# Patient Record
Sex: FEMALE | Race: WHITE | URBAN - METROPOLITAN AREA
[De-identification: names, ages, dates, MRNs, and addresses within clinical notes are randomized per-mention and may not be internally consistent; named-entity substitution may affect disease eponyms.]

---

## 2021-09-24 ENCOUNTER — HOSPITAL ENCOUNTER (OUTPATIENT)
Dept: LAB | Age: 71
Discharge: HOME | End: 2021-09-24

## 2021-09-24 LAB
ANION GAP SERPL CALC-SCNC: 7 MMOL/L (ref 7–16)
BUN SERPL-MCNC: 43 MG/DL (ref 8–23)
CALCIUM SERPL-MCNC: 8.9 MG/DL (ref 8.3–10.4)
CHLORIDE SERPL-SCNC: 102 MMOL/L (ref 98–107)
CO2 SERPL-SCNC: 30 MMOL/L (ref 21–32)
CREAT SERPL-MCNC: 1.36 MG/DL (ref 0.6–1)
GLUCOSE SERPL-MCNC: 80 MG/DL (ref 65–100)
POTASSIUM SERPL-SCNC: 4.6 MMOL/L (ref 3.5–5.1)
SODIUM SERPL-SCNC: 139 MMOL/L (ref 136–145)

## 2021-09-24 PROCEDURE — 80048 BASIC METABOLIC PNL TOTAL CA: CPT

## 2024-01-23 ENCOUNTER — HOSPITAL ENCOUNTER (INPATIENT)
Age: 74
LOS: 1 days | Discharge: HOME | DRG: 481 | End: 2024-01-23
Attending: INTERNAL MEDICINE | Admitting: INTERNAL MEDICINE
Payer: MEDICARE

## 2024-01-23 ENCOUNTER — APPOINTMENT (OUTPATIENT)
Dept: GENERAL RADIOLOGY | Age: 74
DRG: 481 | End: 2024-01-23
Payer: MEDICARE

## 2024-01-23 ENCOUNTER — HOSPITAL ENCOUNTER (INPATIENT)
Age: 74
LOS: 7 days | Discharge: NURSING, REHAB OR LONG-TERM CARE | DRG: 481 | End: 2024-01-30
Attending: FAMILY MEDICINE | Admitting: INTERNAL MEDICINE
Payer: MEDICARE

## 2024-01-23 VITALS
BODY MASS INDEX: 48.82 KG/M2 | WEIGHT: 293 LBS | RESPIRATION RATE: 23 BRPM | TEMPERATURE: 98.5 F | DIASTOLIC BLOOD PRESSURE: 64 MMHG | SYSTOLIC BLOOD PRESSURE: 122 MMHG | HEIGHT: 65 IN | HEART RATE: 100 BPM | OXYGEN SATURATION: 96 %

## 2024-01-23 DIAGNOSIS — S72.92XA CLOSED FRACTURE OF LEFT FEMUR, UNSPECIFIED FRACTURE MORPHOLOGY, UNSPECIFIED PORTION OF FEMUR, INITIAL ENCOUNTER (HCC): Primary | ICD-10-CM

## 2024-01-23 DIAGNOSIS — S72.352P CLOSED DISP COMMINUTED FRACTURE OF SHAFT OF LEFT FEMUR WITH MALUNION: Primary | ICD-10-CM

## 2024-01-23 PROBLEM — I10 HYPERTENSION: Status: ACTIVE | Noted: 2024-01-23

## 2024-01-23 PROBLEM — G62.9 NEUROPATHY: Status: ACTIVE | Noted: 2024-01-23

## 2024-01-23 PROBLEM — N18.30 CKD (CHRONIC KIDNEY DISEASE) STAGE 3, GFR 30-59 ML/MIN (HCC): Status: ACTIVE | Noted: 2024-01-23

## 2024-01-23 PROBLEM — F32.A DEPRESSION: Status: ACTIVE | Noted: 2024-01-23

## 2024-01-23 PROBLEM — M06.9 RHEUMATOID ARTHRITIS (HCC): Status: ACTIVE | Noted: 2024-01-23

## 2024-01-23 LAB
ANION GAP SERPL CALC-SCNC: 4 MMOL/L (ref 2–11)
BASOPHILS # BLD: 0 K/UL (ref 0–0.2)
BASOPHILS NFR BLD: 0 % (ref 0–2)
BUN SERPL-MCNC: 18 MG/DL (ref 8–23)
CALCIUM SERPL-MCNC: 8.9 MG/DL (ref 8.3–10.4)
CHLORIDE SERPL-SCNC: 100 MMOL/L (ref 103–113)
CO2 SERPL-SCNC: 34 MMOL/L (ref 21–32)
CREAT SERPL-MCNC: 1.06 MG/DL (ref 0.6–1)
DIFFERENTIAL METHOD BLD: ABNORMAL
EOSINOPHIL # BLD: 0.3 K/UL (ref 0–0.8)
EOSINOPHIL NFR BLD: 2 % (ref 0.5–7.8)
ERYTHROCYTE [DISTWIDTH] IN BLOOD BY AUTOMATED COUNT: 17 % (ref 11.9–14.6)
GLUCOSE SERPL-MCNC: 129 MG/DL (ref 65–100)
HCT VFR BLD AUTO: 34.1 % (ref 35.8–46.3)
HGB BLD-MCNC: 10.5 G/DL (ref 11.7–15.4)
IMM GRANULOCYTES # BLD AUTO: 0.3 K/UL (ref 0–0.5)
IMM GRANULOCYTES NFR BLD AUTO: 2 % (ref 0–5)
INR PPP: 1.1
LYMPHOCYTES # BLD: 1.3 K/UL (ref 0.5–4.6)
LYMPHOCYTES NFR BLD: 9 % (ref 13–44)
MCH RBC QN AUTO: 29.7 PG (ref 26.1–32.9)
MCHC RBC AUTO-ENTMCNC: 30.8 G/DL (ref 31.4–35)
MCV RBC AUTO: 96.6 FL (ref 82–102)
MONOCYTES # BLD: 1 K/UL (ref 0.1–1.3)
MONOCYTES NFR BLD: 7 % (ref 4–12)
NEUTS SEG # BLD: 12.1 K/UL (ref 1.7–8.2)
NEUTS SEG NFR BLD: 80 % (ref 43–78)
NRBC # BLD: 0 K/UL (ref 0–0.2)
PLATELET # BLD AUTO: 233 K/UL (ref 150–450)
PMV BLD AUTO: 10.9 FL (ref 9.4–12.3)
POTASSIUM SERPL-SCNC: 3.8 MMOL/L (ref 3.5–5.1)
PROTHROMBIN TIME: 14.5 SEC (ref 11.3–14.9)
RBC # BLD AUTO: 3.53 M/UL (ref 4.05–5.2)
SODIUM SERPL-SCNC: 138 MMOL/L (ref 136–146)
WBC # BLD AUTO: 15.1 K/UL (ref 4.3–11.1)

## 2024-01-23 PROCEDURE — 96374 THER/PROPH/DIAG INJ IV PUSH: CPT

## 2024-01-23 PROCEDURE — 1100000000 HC RM PRIVATE

## 2024-01-23 PROCEDURE — 85025 COMPLETE CBC W/AUTO DIFF WBC: CPT

## 2024-01-23 PROCEDURE — 80048 BASIC METABOLIC PNL TOTAL CA: CPT

## 2024-01-23 PROCEDURE — 73552 X-RAY EXAM OF FEMUR 2/>: CPT

## 2024-01-23 PROCEDURE — 2580000003 HC RX 258: Performed by: INTERNAL MEDICINE

## 2024-01-23 PROCEDURE — 93005 ELECTROCARDIOGRAM TRACING: CPT

## 2024-01-23 PROCEDURE — 85610 PROTHROMBIN TIME: CPT

## 2024-01-23 PROCEDURE — 6360000002 HC RX W HCPCS: Performed by: INTERNAL MEDICINE

## 2024-01-23 PROCEDURE — 99285 EMERGENCY DEPT VISIT HI MDM: CPT

## 2024-01-23 PROCEDURE — 6360000002 HC RX W HCPCS

## 2024-01-23 RX ORDER — TAMSULOSIN HYDROCHLORIDE 0.4 MG/1
0.4 CAPSULE ORAL DAILY
Status: CANCELLED | OUTPATIENT
Start: 2024-01-23

## 2024-01-23 RX ORDER — SODIUM CHLORIDE 0.9 % (FLUSH) 0.9 %
5-40 SYRINGE (ML) INJECTION PRN
Status: CANCELLED | OUTPATIENT
Start: 2024-01-23

## 2024-01-23 RX ORDER — PREGABALIN 75 MG/1
75 CAPSULE ORAL 2 TIMES DAILY
Status: CANCELLED | OUTPATIENT
Start: 2024-01-23

## 2024-01-23 RX ORDER — ACETAMINOPHEN 650 MG/1
650 SUPPOSITORY RECTAL EVERY 6 HOURS PRN
Status: DISCONTINUED | OUTPATIENT
Start: 2024-01-23 | End: 2024-01-23 | Stop reason: HOSPADM

## 2024-01-23 RX ORDER — ACETAMINOPHEN 650 MG/1
650 SUPPOSITORY RECTAL EVERY 6 HOURS PRN
Status: CANCELLED | OUTPATIENT
Start: 2024-01-23

## 2024-01-23 RX ORDER — CELECOXIB 200 MG/1
200 CAPSULE ORAL DAILY
Status: ON HOLD | COMMUNITY
End: 2024-01-30 | Stop reason: HOSPADM

## 2024-01-23 RX ORDER — SODIUM CHLORIDE 0.9 % (FLUSH) 0.9 %
5-40 SYRINGE (ML) INJECTION EVERY 12 HOURS SCHEDULED
Status: DISCONTINUED | OUTPATIENT
Start: 2024-01-23 | End: 2024-01-23 | Stop reason: HOSPADM

## 2024-01-23 RX ORDER — ONDANSETRON 2 MG/ML
4 INJECTION INTRAMUSCULAR; INTRAVENOUS EVERY 6 HOURS PRN
Status: DISCONTINUED | OUTPATIENT
Start: 2024-01-23 | End: 2024-01-26 | Stop reason: SDUPTHER

## 2024-01-23 RX ORDER — MAGNESIUM SULFATE IN WATER 40 MG/ML
2000 INJECTION, SOLUTION INTRAVENOUS PRN
Status: DISCONTINUED | OUTPATIENT
Start: 2024-01-23 | End: 2024-01-23 | Stop reason: HOSPADM

## 2024-01-23 RX ORDER — ACETAMINOPHEN 325 MG/1
650 TABLET ORAL EVERY 6 HOURS PRN
Status: DISCONTINUED | OUTPATIENT
Start: 2024-01-23 | End: 2024-01-23 | Stop reason: HOSPADM

## 2024-01-23 RX ORDER — SODIUM CHLORIDE 0.9 % (FLUSH) 0.9 %
5-40 SYRINGE (ML) INJECTION PRN
Status: DISCONTINUED | OUTPATIENT
Start: 2024-01-23 | End: 2024-01-30 | Stop reason: HOSPADM

## 2024-01-23 RX ORDER — LANOLIN ALCOHOL/MO/W.PET/CERES
3 CREAM (GRAM) TOPICAL
COMMUNITY

## 2024-01-23 RX ORDER — ENOXAPARIN SODIUM 100 MG/ML
30 INJECTION SUBCUTANEOUS 2 TIMES DAILY
Status: DISCONTINUED | OUTPATIENT
Start: 2024-01-23 | End: 2024-01-24

## 2024-01-23 RX ORDER — POTASSIUM CHLORIDE 7.45 MG/ML
10 INJECTION INTRAVENOUS PRN
Status: DISCONTINUED | OUTPATIENT
Start: 2024-01-23 | End: 2024-01-30 | Stop reason: HOSPADM

## 2024-01-23 RX ORDER — METOPROLOL SUCCINATE 25 MG/1
25 TABLET, EXTENDED RELEASE ORAL DAILY
Status: ON HOLD | COMMUNITY

## 2024-01-23 RX ORDER — METOPROLOL SUCCINATE 25 MG/1
25 TABLET, EXTENDED RELEASE ORAL DAILY
Status: CANCELLED | OUTPATIENT
Start: 2024-01-23

## 2024-01-23 RX ORDER — SODIUM CHLORIDE 9 MG/ML
INJECTION, SOLUTION INTRAVENOUS PRN
Status: DISCONTINUED | OUTPATIENT
Start: 2024-01-23 | End: 2024-01-23 | Stop reason: HOSPADM

## 2024-01-23 RX ORDER — POTASSIUM CHLORIDE 20 MEQ/1
40 TABLET, EXTENDED RELEASE ORAL PRN
Status: CANCELLED | OUTPATIENT
Start: 2024-01-23

## 2024-01-23 RX ORDER — SODIUM CHLORIDE, SODIUM LACTATE, POTASSIUM CHLORIDE, CALCIUM CHLORIDE 600; 310; 30; 20 MG/100ML; MG/100ML; MG/100ML; MG/100ML
INJECTION, SOLUTION INTRAVENOUS CONTINUOUS
Status: DISCONTINUED | OUTPATIENT
Start: 2024-01-23 | End: 2024-01-23 | Stop reason: HOSPADM

## 2024-01-23 RX ORDER — ERGOCALCIFEROL 1.25 MG/1
50000 CAPSULE ORAL WEEKLY
Status: ON HOLD | COMMUNITY

## 2024-01-23 RX ORDER — POLYETHYLENE GLYCOL 3350 17 G/17G
17 POWDER, FOR SOLUTION ORAL DAILY PRN
Status: DISCONTINUED | OUTPATIENT
Start: 2024-01-23 | End: 2024-01-23 | Stop reason: HOSPADM

## 2024-01-23 RX ORDER — FERROUS SULFATE 325(65) MG
325 TABLET ORAL
Status: ON HOLD | COMMUNITY

## 2024-01-23 RX ORDER — MORPHINE SULFATE 2 MG/ML
2 INJECTION, SOLUTION INTRAMUSCULAR; INTRAVENOUS EVERY 4 HOURS PRN
Status: CANCELLED | OUTPATIENT
Start: 2024-01-23

## 2024-01-23 RX ORDER — ONDANSETRON 4 MG/1
4 TABLET, ORALLY DISINTEGRATING ORAL EVERY 8 HOURS PRN
Status: DISCONTINUED | OUTPATIENT
Start: 2024-01-23 | End: 2024-01-26 | Stop reason: SDUPTHER

## 2024-01-23 RX ORDER — FERROUS SULFATE 325(65) MG
325 TABLET ORAL
Status: CANCELLED | OUTPATIENT
Start: 2024-01-24

## 2024-01-23 RX ORDER — HYDROXYCHLOROQUINE SULFATE 200 MG/1
200 TABLET, FILM COATED ORAL 2 TIMES DAILY
Status: ON HOLD | COMMUNITY

## 2024-01-23 RX ORDER — ESCITALOPRAM OXALATE 10 MG/1
10 TABLET ORAL DAILY
Status: CANCELLED | OUTPATIENT
Start: 2024-01-23

## 2024-01-23 RX ORDER — SODIUM CHLORIDE 0.9 % (FLUSH) 0.9 %
5-40 SYRINGE (ML) INJECTION EVERY 12 HOURS SCHEDULED
Status: CANCELLED | OUTPATIENT
Start: 2024-01-23

## 2024-01-23 RX ORDER — POTASSIUM CHLORIDE 7.45 MG/ML
10 INJECTION INTRAVENOUS PRN
Status: DISCONTINUED | OUTPATIENT
Start: 2024-01-23 | End: 2024-01-23 | Stop reason: HOSPADM

## 2024-01-23 RX ORDER — TRAMADOL HYDROCHLORIDE 50 MG/1
50 TABLET ORAL EVERY 6 HOURS PRN
Status: DISCONTINUED | OUTPATIENT
Start: 2024-01-23 | End: 2024-01-30 | Stop reason: HOSPADM

## 2024-01-23 RX ORDER — POTASSIUM CHLORIDE 20 MEQ/1
40 TABLET, EXTENDED RELEASE ORAL PRN
Status: DISCONTINUED | OUTPATIENT
Start: 2024-01-23 | End: 2024-01-23 | Stop reason: HOSPADM

## 2024-01-23 RX ORDER — ENOXAPARIN SODIUM 100 MG/ML
30 INJECTION SUBCUTANEOUS 2 TIMES DAILY
Status: CANCELLED | OUTPATIENT
Start: 2024-01-23

## 2024-01-23 RX ORDER — DOXEPIN HYDROCHLORIDE 100 MG/1
150 CAPSULE ORAL NIGHTLY
Status: ON HOLD | COMMUNITY

## 2024-01-23 RX ORDER — AMLODIPINE BESYLATE 2.5 MG/1
2.5 TABLET ORAL DAILY
Status: ON HOLD | COMMUNITY

## 2024-01-23 RX ORDER — ONDANSETRON 2 MG/ML
4 INJECTION INTRAMUSCULAR; INTRAVENOUS EVERY 6 HOURS PRN
Status: DISCONTINUED | OUTPATIENT
Start: 2024-01-23 | End: 2024-01-23 | Stop reason: HOSPADM

## 2024-01-23 RX ORDER — SODIUM CHLORIDE, SODIUM LACTATE, POTASSIUM CHLORIDE, CALCIUM CHLORIDE 600; 310; 30; 20 MG/100ML; MG/100ML; MG/100ML; MG/100ML
INJECTION, SOLUTION INTRAVENOUS CONTINUOUS
Status: DISCONTINUED | OUTPATIENT
Start: 2024-01-23 | End: 2024-01-25

## 2024-01-23 RX ORDER — METOLAZONE 5 MG/1
5 TABLET ORAL DAILY PRN
Status: ON HOLD | COMMUNITY

## 2024-01-23 RX ORDER — MAGNESIUM SULFATE IN WATER 40 MG/ML
2000 INJECTION, SOLUTION INTRAVENOUS PRN
Status: CANCELLED | OUTPATIENT
Start: 2024-01-23

## 2024-01-23 RX ORDER — PREDNISONE 5 MG/1
5 TABLET ORAL DAILY
Status: ON HOLD | COMMUNITY

## 2024-01-23 RX ORDER — SODIUM CHLORIDE 0.9 % (FLUSH) 0.9 %
5-40 SYRINGE (ML) INJECTION PRN
Status: DISCONTINUED | OUTPATIENT
Start: 2024-01-23 | End: 2024-01-23 | Stop reason: HOSPADM

## 2024-01-23 RX ORDER — POLYETHYLENE GLYCOL 3350 17 G/17G
17 POWDER, FOR SOLUTION ORAL DAILY PRN
Status: DISCONTINUED | OUTPATIENT
Start: 2024-01-23 | End: 2024-01-30 | Stop reason: HOSPADM

## 2024-01-23 RX ORDER — ONDANSETRON 2 MG/ML
4 INJECTION INTRAMUSCULAR; INTRAVENOUS EVERY 6 HOURS PRN
Status: CANCELLED | OUTPATIENT
Start: 2024-01-23

## 2024-01-23 RX ORDER — TORSEMIDE 100 MG/1
50 TABLET ORAL DAILY
Status: ON HOLD | COMMUNITY

## 2024-01-23 RX ORDER — TRAMADOL HYDROCHLORIDE 50 MG/1
50 TABLET ORAL EVERY 6 HOURS PRN
Status: DISCONTINUED | OUTPATIENT
Start: 2024-01-23 | End: 2024-01-23 | Stop reason: HOSPADM

## 2024-01-23 RX ORDER — ESCITALOPRAM OXALATE 10 MG/1
10 TABLET ORAL DAILY
Status: ON HOLD | COMMUNITY

## 2024-01-23 RX ORDER — ACETAMINOPHEN 325 MG/1
650 TABLET ORAL EVERY 6 HOURS PRN
Status: CANCELLED | OUTPATIENT
Start: 2024-01-23

## 2024-01-23 RX ORDER — POTASSIUM CHLORIDE 7.45 MG/ML
10 INJECTION INTRAVENOUS PRN
Status: CANCELLED | OUTPATIENT
Start: 2024-01-23

## 2024-01-23 RX ORDER — MORPHINE SULFATE 4 MG/ML
4 INJECTION INTRAVENOUS ONCE
Status: COMPLETED | OUTPATIENT
Start: 2024-01-23 | End: 2024-01-23

## 2024-01-23 RX ORDER — MORPHINE SULFATE 2 MG/ML
2 INJECTION, SOLUTION INTRAMUSCULAR; INTRAVENOUS EVERY 4 HOURS PRN
Status: DISCONTINUED | OUTPATIENT
Start: 2024-01-23 | End: 2024-01-24

## 2024-01-23 RX ORDER — ONDANSETRON 4 MG/1
4 TABLET, ORALLY DISINTEGRATING ORAL EVERY 8 HOURS PRN
Status: CANCELLED | OUTPATIENT
Start: 2024-01-23

## 2024-01-23 RX ORDER — TRAZODONE HYDROCHLORIDE 50 MG/1
300 TABLET ORAL NIGHTLY
Status: CANCELLED | OUTPATIENT
Start: 2024-01-23

## 2024-01-23 RX ORDER — TRAZODONE HYDROCHLORIDE 150 MG/1
300 TABLET ORAL NIGHTLY
Status: ON HOLD | COMMUNITY

## 2024-01-23 RX ORDER — HYDROCODONE BITARTRATE AND ACETAMINOPHEN 5; 325 MG/1; MG/1
1 TABLET ORAL 2 TIMES DAILY PRN
COMMUNITY

## 2024-01-23 RX ORDER — LORAZEPAM 2 MG/1
2 TABLET ORAL NIGHTLY
COMMUNITY

## 2024-01-23 RX ORDER — ACETAMINOPHEN 325 MG/1
650 TABLET ORAL EVERY 6 HOURS PRN
Status: DISCONTINUED | OUTPATIENT
Start: 2024-01-23 | End: 2024-01-30 | Stop reason: HOSPADM

## 2024-01-23 RX ORDER — MORPHINE SULFATE 2 MG/ML
2 INJECTION, SOLUTION INTRAMUSCULAR; INTRAVENOUS EVERY 4 HOURS PRN
Status: DISCONTINUED | OUTPATIENT
Start: 2024-01-23 | End: 2024-01-23 | Stop reason: HOSPADM

## 2024-01-23 RX ORDER — SODIUM CHLORIDE 0.9 % (FLUSH) 0.9 %
5-40 SYRINGE (ML) INJECTION EVERY 12 HOURS SCHEDULED
Status: DISCONTINUED | OUTPATIENT
Start: 2024-01-23 | End: 2024-01-30 | Stop reason: HOSPADM

## 2024-01-23 RX ORDER — ONDANSETRON 4 MG/1
4 TABLET, ORALLY DISINTEGRATING ORAL EVERY 8 HOURS PRN
Status: DISCONTINUED | OUTPATIENT
Start: 2024-01-23 | End: 2024-01-23 | Stop reason: HOSPADM

## 2024-01-23 RX ORDER — SODIUM CHLORIDE, SODIUM LACTATE, POTASSIUM CHLORIDE, CALCIUM CHLORIDE 600; 310; 30; 20 MG/100ML; MG/100ML; MG/100ML; MG/100ML
INJECTION, SOLUTION INTRAVENOUS CONTINUOUS
Status: CANCELLED | OUTPATIENT
Start: 2024-01-23 | End: 2024-01-25

## 2024-01-23 RX ORDER — PREDNISONE 10 MG/1
5 TABLET ORAL DAILY
Status: CANCELLED | OUTPATIENT
Start: 2024-01-23

## 2024-01-23 RX ORDER — POTASSIUM CHLORIDE 20 MEQ/1
40 TABLET, EXTENDED RELEASE ORAL PRN
Status: DISCONTINUED | OUTPATIENT
Start: 2024-01-23 | End: 2024-01-30 | Stop reason: HOSPADM

## 2024-01-23 RX ORDER — HYDROXYCHLOROQUINE SULFATE 200 MG/1
200 TABLET, FILM COATED ORAL 2 TIMES DAILY
Status: CANCELLED | OUTPATIENT
Start: 2024-01-23

## 2024-01-23 RX ORDER — AMLODIPINE BESYLATE 5 MG/1
2.5 TABLET ORAL DAILY
Status: CANCELLED | OUTPATIENT
Start: 2024-01-23

## 2024-01-23 RX ORDER — FOLIC ACID 1 MG/1
1 TABLET ORAL DAILY
COMMUNITY

## 2024-01-23 RX ORDER — ACETAMINOPHEN 650 MG/1
650 SUPPOSITORY RECTAL EVERY 6 HOURS PRN
Status: DISCONTINUED | OUTPATIENT
Start: 2024-01-23 | End: 2024-01-30 | Stop reason: HOSPADM

## 2024-01-23 RX ORDER — MAGNESIUM SULFATE IN WATER 40 MG/ML
2000 INJECTION, SOLUTION INTRAVENOUS PRN
Status: DISCONTINUED | OUTPATIENT
Start: 2024-01-23 | End: 2024-01-30 | Stop reason: HOSPADM

## 2024-01-23 RX ORDER — SODIUM CHLORIDE 9 MG/ML
INJECTION, SOLUTION INTRAVENOUS PRN
Status: CANCELLED | OUTPATIENT
Start: 2024-01-23

## 2024-01-23 RX ORDER — ENOXAPARIN SODIUM 100 MG/ML
30 INJECTION SUBCUTANEOUS EVERY 12 HOURS
Status: DISCONTINUED | OUTPATIENT
Start: 2024-01-23 | End: 2024-01-23 | Stop reason: HOSPADM

## 2024-01-23 RX ORDER — DOXEPIN HYDROCHLORIDE 50 MG/1
150 CAPSULE ORAL NIGHTLY
Status: CANCELLED | OUTPATIENT
Start: 2024-01-23

## 2024-01-23 RX ORDER — POLYETHYLENE GLYCOL 3350 17 G/17G
17 POWDER, FOR SOLUTION ORAL DAILY PRN
Status: CANCELLED | OUTPATIENT
Start: 2024-01-23

## 2024-01-23 RX ORDER — TAMSULOSIN HYDROCHLORIDE 0.4 MG/1
0.4 CAPSULE ORAL DAILY
Status: ON HOLD | COMMUNITY

## 2024-01-23 RX ORDER — SODIUM CHLORIDE 9 MG/ML
INJECTION, SOLUTION INTRAVENOUS PRN
Status: DISCONTINUED | OUTPATIENT
Start: 2024-01-23 | End: 2024-01-30 | Stop reason: HOSPADM

## 2024-01-23 RX ORDER — PREGABALIN 75 MG/1
75 CAPSULE ORAL 2 TIMES DAILY
Status: ON HOLD | COMMUNITY

## 2024-01-23 RX ORDER — TRAMADOL HYDROCHLORIDE 50 MG/1
50 TABLET ORAL EVERY 6 HOURS PRN
Status: CANCELLED | OUTPATIENT
Start: 2024-01-23

## 2024-01-23 RX ADMIN — MORPHINE SULFATE 4 MG: 4 INJECTION, SOLUTION INTRAMUSCULAR; INTRAVENOUS at 17:38

## 2024-01-23 RX ADMIN — SODIUM CHLORIDE, SODIUM LACTATE, POTASSIUM CHLORIDE, AND CALCIUM CHLORIDE: 600; 310; 30; 20 INJECTION, SOLUTION INTRAVENOUS at 21:30

## 2024-01-23 RX ADMIN — ENOXAPARIN SODIUM 30 MG: 100 INJECTION SUBCUTANEOUS at 21:30

## 2024-01-23 RX ADMIN — MORPHINE SULFATE 2 MG: 2 INJECTION, SOLUTION INTRAMUSCULAR; INTRAVENOUS at 21:47

## 2024-01-23 ASSESSMENT — PAIN DESCRIPTION - ORIENTATION
ORIENTATION: LEFT
ORIENTATION: LEFT
ORIENTATION: LEFT;ANTERIOR;MID

## 2024-01-23 ASSESSMENT — ENCOUNTER SYMPTOMS
ABDOMINAL PAIN: 0
COLOR CHANGE: 0
SHORTNESS OF BREATH: 0
VOMITING: 0
CHEST TIGHTNESS: 0
DIARRHEA: 0
NAUSEA: 0

## 2024-01-23 ASSESSMENT — PAIN DESCRIPTION - DESCRIPTORS: DESCRIPTORS: ACHING

## 2024-01-23 ASSESSMENT — PAIN - FUNCTIONAL ASSESSMENT: PAIN_FUNCTIONAL_ASSESSMENT: 0-10

## 2024-01-23 ASSESSMENT — PAIN DESCRIPTION - LOCATION
LOCATION: LEG
LOCATION: LEG

## 2024-01-23 ASSESSMENT — PAIN SCALES - GENERAL
PAINLEVEL_OUTOF10: 5
PAINLEVEL_OUTOF10: 8
PAINLEVEL_OUTOF10: 3

## 2024-01-23 NOTE — ED NOTES
TRANSFER - OUT REPORT:    Verbal report given to Lance RN on Ama Chu  being transferred to [facility] for routine progression of patient care       Report consisted of patient's Situation, Background, Assessment and   Recommendations(SBAR).     Information from the following report(s) ED SBAR was reviewed with the receiving nurse.    Lines:   Peripheral IV 01/23/24 Right Antecubital (Active)        Opportunity for questions and clarification was provided.      Patient transported with:  Tech

## 2024-01-23 NOTE — ED PROVIDER NOTES
Emergency Department Provider Note       PCP: Jose Miguel Jimenez Jr., MD   Age: 73 y.o.   Sex: female     DISPOSITION Decision To Admit 01/23/2024 05:21:45 PM       ICD-10-CM    1. Closed fracture of left femur, unspecified fracture morphology, unspecified portion of femur, initial encounter ([facility])  S72.92XA           Medical Decision Making     Complexity of Problems Addressed:  1 or more acute illnesses that pose a threat to life or bodily function.     Data Reviewed and Analyzed:  I independently ordered and reviewed each unique test.         I interpreted the X-rays significantly displaced left femur fracture.    Discussion of management or test interpretation.  X-ray here confirms significantly displaced left distal femur fracture.  Vitals remained stable.  Discussed case with [facility].  Preoperative labs in process.  Patient understanding and agreeable.  The patient was admitted and I have discussed patient management with the admitting provider.  The management of this patient was discussed with an external consultant.      Risk of Complications and/or Morbidity of Patient Management:  Parental controlled substances given in the ED.  Discussion with external consultants.         History       Patient is a 73-year-old female with history of morbid obesity, chronic pain syndrome presenting to the emergency department for evaluation of femur fracture.  Patient states that she had a mechanical fall about a week ago and has been continuing to have persistent pain in her left lower extremity since and has been unable to bear any weight whatsoever.  States that she went to an orthopedic clinic earlier today where x-rays were performed which demonstrated a fractured femur.  She was advised to present to the hospital for surgical intervention.  She denies any other injuries.  She denies any distal numbness or paresthesias.  She  K/UL        XR FEMUR LEFT (MIN 2 VIEWS)    (Results Pending)                     Voice dictation software was used during the making of this note.  This software is not perfect and grammatical and other typographical errors may be present.  This note has not been completely proofread for errors.       Laina Higuera PA  01/23/24 1800

## 2024-01-23 NOTE — ED TRIAGE NOTES
Pt states she had a fall approx 1 week ago. Pt was seen by ortho today and was told she had a broken femur and was sent to ED.

## 2024-01-23 NOTE — H&P
Hospitalist History and Physical   Admit Date:  2024  4:32 PM   Name:  Ama Chu   Age:  73 y.o.  Sex:  female  :  1950   MRN:  [id number]   Room:  ER08/    Presenting/Chief Complaint: Fall     Reason(s) for Admission: Closed disp comminuted fracture of shaft of left femur with malunion [S72.352P]     History of Present Illness:   Ama Chu is a 73 y.o. female with medical history of morbid obesity, rheumatoid arthritis, neuropathy, hypertension, depression who presented with left lower leg pain that has persisted for the past week after sliding off her couch.  Patient was evaluated by her orthopedic surgeon who noted that she had a left displaced femur fracture and recommended patient go to the ER to be admitted for surgery.  Patient reports that she suffered the injury approximately 1 week ago.  She was sitting on the couch and was attempting to reach for something when she slid off of it and hurt her left leg.  Since then, she has had pain and was unable to bear weight.  She denied any worsening numbness, weakness in the lower extremity.  She denies any current fevers, chills, shortness of breath, chest pain, nausea, vomiting.    In the ER, patient had x-ray of the left femur which showed a displaced left femur fracture.  As per ER physician, case was discussed with orthopedic surgeon who recommended transfer to [facility] for surgery tomorrow with Dr. Braga's team.    Patient currently lives with her daughter.  She is independent in her ADLs and IADLs.  Recently, she has been using a wheelchair to get around she has had difficulty walking.  She is a former smoker, nondrinker, nondrug user.      Assessment & Plan:     Left femur fracture  As per ER discussion with orthopedic surgery, patient will be transferred to [facility] for surgery likely tomorrow.  White count slightly elevated in setting of fracture and likely continued steroid use.  -Follow-up orthopedic

## 2024-01-23 NOTE — PROGRESS NOTES
TRANSFER - IN REPORT:    Verbal report received from CANDICE Curran on Ama Chu  being received from  ED for routine progression of patient care      Report consisted of patient's Situation, Background, Assessment and   Recommendations(SBAR).     Information from the following report(s) Nurse Handoff Report was reviewed with the receiving nurse.    Opportunity for questions and clarification was provided.      Assessment completed upon patient's arrival to unit and care assumed.

## 2024-01-23 NOTE — H&P (VIEW-ONLY)
Hospitalist History and Physical   Admit Date:  2024  4:32 PM   Name:  Ama Chu   Age:  73 y.o.  Sex:  female  :  1950   MRN:  [id number]   Room:  ER08/    Presenting/Chief Complaint: Fall     Reason(s) for Admission: Closed disp comminuted fracture of shaft of left femur with malunion [S72.352P]     History of Present Illness:   Ama Chu is a 73 y.o. female with medical history of morbid obesity, rheumatoid arthritis, neuropathy, hypertension, depression who presented with left lower leg pain that has persisted for the past week after sliding off her couch.  Patient was evaluated by her orthopedic surgeon who noted that she had a left displaced femur fracture and recommended patient go to the ER to be admitted for surgery.  Patient reports that she suffered the injury approximately 1 week ago.  She was sitting on the couch and was attempting to reach for something when she slid off of it and hurt her left leg.  Since then, she has had pain and was unable to bear weight.  She denied any worsening numbness, weakness in the lower extremity.  She denies any current fevers, chills, shortness of breath, chest pain, nausea, vomiting.    In the ER, patient had x-ray of the left femur which showed a displaced left femur fracture.  As per ER physician, case was discussed with orthopedic surgeon who recommended transfer to [facility] for surgery tomorrow with Dr. Braga's team.    Patient currently lives with her daughter.  She is independent in her ADLs and IADLs.  Recently, she has been using a wheelchair to get around she has had difficulty walking.  She is a former smoker, nondrinker, nondrug user.      Assessment & Plan:     Left femur fracture  As per ER discussion with orthopedic surgery, patient will be transferred to [facility] for surgery likely tomorrow.  White count slightly elevated in setting of fracture and likely continued steroid use.  -Follow-up orthopedic

## 2024-01-24 ENCOUNTER — APPOINTMENT (OUTPATIENT)
Dept: GENERAL RADIOLOGY | Age: 74
DRG: 481 | End: 2024-01-24
Attending: FAMILY MEDICINE
Payer: MEDICARE

## 2024-01-24 ENCOUNTER — ANESTHESIA EVENT (OUTPATIENT)
Dept: SURGERY | Age: 74
End: 2024-01-24
Payer: MEDICARE

## 2024-01-24 ENCOUNTER — ANESTHESIA (OUTPATIENT)
Dept: SURGERY | Age: 74
End: 2024-01-24
Payer: MEDICARE

## 2024-01-24 LAB
ABO + RH BLD: NORMAL
ANION GAP SERPL CALC-SCNC: 2 MMOL/L (ref 2–11)
BASOPHILS # BLD: 0 K/UL (ref 0–0.2)
BASOPHILS NFR BLD: 0 % (ref 0–2)
BLOOD GROUP ANTIBODIES SERPL: NORMAL
BUN SERPL-MCNC: 15 MG/DL (ref 8–23)
CALCIUM SERPL-MCNC: 8.6 MG/DL (ref 8.3–10.4)
CHLORIDE SERPL-SCNC: 102 MMOL/L (ref 103–113)
CO2 SERPL-SCNC: 33 MMOL/L (ref 21–32)
CREAT SERPL-MCNC: 0.7 MG/DL (ref 0.6–1)
DIFFERENTIAL METHOD BLD: ABNORMAL
EKG ATRIAL RATE: 101 BPM
EKG DIAGNOSIS: NORMAL
EKG P AXIS: 121 DEGREES
EKG P-R INTERVAL: 179 MS
EKG Q-T INTERVAL: 379 MS
EKG QRS DURATION: 69 MS
EKG QTC CALCULATION (BAZETT): 489 MS
EKG R AXIS: 181 DEGREES
EKG T AXIS: 158 DEGREES
EKG VENTRICULAR RATE: 100 BPM
EOSINOPHIL # BLD: 0.3 K/UL (ref 0–0.8)
EOSINOPHIL NFR BLD: 2 % (ref 0.5–7.8)
ERYTHROCYTE [DISTWIDTH] IN BLOOD BY AUTOMATED COUNT: 17 % (ref 11.9–14.6)
GLUCOSE SERPL-MCNC: 120 MG/DL (ref 65–100)
HCT VFR BLD AUTO: 30.2 % (ref 35.8–46.3)
HGB BLD-MCNC: 9.4 G/DL (ref 11.7–15.4)
IMM GRANULOCYTES # BLD AUTO: 0.2 K/UL (ref 0–0.5)
IMM GRANULOCYTES NFR BLD AUTO: 2 % (ref 0–5)
LYMPHOCYTES # BLD: 1.3 K/UL (ref 0.5–4.6)
LYMPHOCYTES NFR BLD: 9 % (ref 13–44)
MCH RBC QN AUTO: 29.8 PG (ref 26.1–32.9)
MCHC RBC AUTO-ENTMCNC: 31.1 G/DL (ref 31.4–35)
MCV RBC AUTO: 95.9 FL (ref 82–102)
MONOCYTES # BLD: 0.9 K/UL (ref 0.1–1.3)
MONOCYTES NFR BLD: 6 % (ref 4–12)
NEUTS SEG # BLD: 11.6 K/UL (ref 1.7–8.2)
NEUTS SEG NFR BLD: 81 % (ref 43–78)
NRBC # BLD: 0 K/UL (ref 0–0.2)
PLATELET # BLD AUTO: 214 K/UL (ref 150–450)
PMV BLD AUTO: 10.7 FL (ref 9.4–12.3)
POTASSIUM SERPL-SCNC: 3.7 MMOL/L (ref 3.5–5.1)
RBC # BLD AUTO: 3.15 M/UL (ref 4.05–5.2)
SODIUM SERPL-SCNC: 137 MMOL/L (ref 136–146)
SPECIMEN EXP DATE BLD: NORMAL
WBC # BLD AUTO: 14.3 K/UL (ref 4.3–11.1)

## 2024-01-24 PROCEDURE — 6370000000 HC RX 637 (ALT 250 FOR IP): Performed by: ORTHOPAEDIC SURGERY

## 2024-01-24 PROCEDURE — 2580000003 HC RX 258: Performed by: INTERNAL MEDICINE

## 2024-01-24 PROCEDURE — 6370000000 HC RX 637 (ALT 250 FOR IP): Performed by: STUDENT IN AN ORGANIZED HEALTH CARE EDUCATION/TRAINING PROGRAM

## 2024-01-24 PROCEDURE — 86901 BLOOD TYPING SEROLOGIC RH(D): CPT

## 2024-01-24 PROCEDURE — 0QS906Z REPOSITION LEFT FEMORAL SHAFT WITH INTRAMEDULLARY INTERNAL FIXATION DEVICE, OPEN APPROACH: ICD-10-PCS | Performed by: ORTHOPAEDIC SURGERY

## 2024-01-24 PROCEDURE — 3700000001 HC ADD 15 MINUTES (ANESTHESIA): Performed by: ORTHOPAEDIC SURGERY

## 2024-01-24 PROCEDURE — 2709999900 HC NON-CHARGEABLE SUPPLY: Performed by: ORTHOPAEDIC SURGERY

## 2024-01-24 PROCEDURE — 3600000014 HC SURGERY LEVEL 4 ADDTL 15MIN: Performed by: ORTHOPAEDIC SURGERY

## 2024-01-24 PROCEDURE — 6360000002 HC RX W HCPCS: Performed by: ORTHOPAEDIC SURGERY

## 2024-01-24 PROCEDURE — 80048 BASIC METABOLIC PNL TOTAL CA: CPT

## 2024-01-24 PROCEDURE — 6370000000 HC RX 637 (ALT 250 FOR IP): Performed by: INTERNAL MEDICINE

## 2024-01-24 PROCEDURE — 2580000003 HC RX 258: Performed by: ORTHOPAEDIC SURGERY

## 2024-01-24 PROCEDURE — 2580000003 HC RX 258: Performed by: NURSE ANESTHETIST, CERTIFIED REGISTERED

## 2024-01-24 PROCEDURE — 7100000001 HC PACU RECOVERY - ADDTL 15 MIN: Performed by: ORTHOPAEDIC SURGERY

## 2024-01-24 PROCEDURE — 2720000010 HC SURG SUPPLY STERILE: Performed by: ORTHOPAEDIC SURGERY

## 2024-01-24 PROCEDURE — 6360000002 HC RX W HCPCS: Performed by: NURSE ANESTHETIST, CERTIFIED REGISTERED

## 2024-01-24 PROCEDURE — 93010 ELECTROCARDIOGRAM REPORT: CPT | Performed by: INTERNAL MEDICINE

## 2024-01-24 PROCEDURE — C1713 ANCHOR/SCREW BN/BN,TIS/BN: HCPCS | Performed by: ORTHOPAEDIC SURGERY

## 2024-01-24 PROCEDURE — 2500000003 HC RX 250 WO HCPCS: Performed by: HOSPITALIST

## 2024-01-24 PROCEDURE — 3700000000 HC ANESTHESIA ATTENDED CARE: Performed by: ORTHOPAEDIC SURGERY

## 2024-01-24 PROCEDURE — 73552 X-RAY EXAM OF FEMUR 2/>: CPT

## 2024-01-24 PROCEDURE — 36415 COLL VENOUS BLD VENIPUNCTURE: CPT

## 2024-01-24 PROCEDURE — 86900 BLOOD TYPING SEROLOGIC ABO: CPT

## 2024-01-24 PROCEDURE — 3600000004 HC SURGERY LEVEL 4 BASE: Performed by: ORTHOPAEDIC SURGERY

## 2024-01-24 PROCEDURE — 6360000002 HC RX W HCPCS: Performed by: INTERNAL MEDICINE

## 2024-01-24 PROCEDURE — 1100000000 HC RM PRIVATE

## 2024-01-24 PROCEDURE — 86850 RBC ANTIBODY SCREEN: CPT

## 2024-01-24 PROCEDURE — 7100000000 HC PACU RECOVERY - FIRST 15 MIN: Performed by: ORTHOPAEDIC SURGERY

## 2024-01-24 PROCEDURE — 2500000003 HC RX 250 WO HCPCS: Performed by: NURSE ANESTHETIST, CERTIFIED REGISTERED

## 2024-01-24 PROCEDURE — 85025 COMPLETE CBC W/AUTO DIFF WBC: CPT

## 2024-01-24 DEVICE — SCREW LCK F/IM NAIL 5X40MM XL25 STR: Type: IMPLANTABLE DEVICE | Site: FEMUR | Status: FUNCTIONAL

## 2024-01-24 DEVICE — SCREW BNE LCK 5X80 MM TI STRL RFNADVANCED 04045080S: Type: IMPLANTABLE DEVICE | Site: FEMUR | Status: FUNCTIONAL

## 2024-01-24 DEVICE — NAIL RFNA 12MMX 380MM 5 DEG BEND/ ST: Type: IMPLANTABLE DEVICE | Site: FEMUR | Status: FUNCTIONAL

## 2024-01-24 DEVICE — SCREW LK F/IM NAIL 5X72MM XL25 STERILE: Type: IMPLANTABLE DEVICE | Site: FEMUR | Status: FUNCTIONAL

## 2024-01-24 DEVICE — SCREW BNE LCK 5X64 MM XL25 RETROGRADE TI STRL RFNADVANCED: Type: IMPLANTABLE DEVICE | Site: FEMUR | Status: FUNCTIONAL

## 2024-01-24 RX ORDER — ESCITALOPRAM OXALATE 10 MG/1
10 TABLET ORAL DAILY
Status: DISCONTINUED | OUTPATIENT
Start: 2024-01-24 | End: 2024-01-30 | Stop reason: HOSPADM

## 2024-01-24 RX ORDER — HYDROCODONE BITARTRATE AND ACETAMINOPHEN 5; 325 MG/1; MG/1
2 TABLET ORAL EVERY 4 HOURS PRN
Status: DISCONTINUED | OUTPATIENT
Start: 2024-01-24 | End: 2024-01-30 | Stop reason: HOSPADM

## 2024-01-24 RX ORDER — LORAZEPAM 1 MG/1
2 TABLET ORAL NIGHTLY
Status: DISCONTINUED | OUTPATIENT
Start: 2024-01-24 | End: 2024-01-30 | Stop reason: HOSPADM

## 2024-01-24 RX ORDER — LIDOCAINE HYDROCHLORIDE 20 MG/ML
INJECTION, SOLUTION EPIDURAL; INFILTRATION; INTRACAUDAL; PERINEURAL PRN
Status: DISCONTINUED | OUTPATIENT
Start: 2024-01-24 | End: 2024-01-24 | Stop reason: SDUPTHER

## 2024-01-24 RX ORDER — HYDROCODONE BITARTRATE AND ACETAMINOPHEN 5; 325 MG/1; MG/1
1 TABLET ORAL 2 TIMES DAILY PRN
Status: DISCONTINUED | OUTPATIENT
Start: 2024-01-24 | End: 2024-01-24

## 2024-01-24 RX ORDER — ERGOCALCIFEROL 1.25 MG/1
50000 CAPSULE ORAL WEEKLY
Status: DISCONTINUED | OUTPATIENT
Start: 2024-01-24 | End: 2024-01-30 | Stop reason: HOSPADM

## 2024-01-24 RX ORDER — PREGABALIN 75 MG/1
75 CAPSULE ORAL 2 TIMES DAILY
Status: DISCONTINUED | OUTPATIENT
Start: 2024-01-24 | End: 2024-01-30 | Stop reason: HOSPADM

## 2024-01-24 RX ORDER — FENTANYL CITRATE 50 UG/ML
INJECTION, SOLUTION INTRAMUSCULAR; INTRAVENOUS PRN
Status: DISCONTINUED | OUTPATIENT
Start: 2024-01-24 | End: 2024-01-24 | Stop reason: SDUPTHER

## 2024-01-24 RX ORDER — LANOLIN ALCOHOL/MO/W.PET/CERES
3 CREAM (GRAM) TOPICAL
Status: DISCONTINUED | OUTPATIENT
Start: 2024-01-24 | End: 2024-01-30 | Stop reason: HOSPADM

## 2024-01-24 RX ORDER — TAMSULOSIN HYDROCHLORIDE 0.4 MG/1
0.4 CAPSULE ORAL DAILY
Status: DISCONTINUED | OUTPATIENT
Start: 2024-01-24 | End: 2024-01-30 | Stop reason: HOSPADM

## 2024-01-24 RX ORDER — ONDANSETRON 2 MG/ML
4 INJECTION INTRAMUSCULAR; INTRAVENOUS EVERY 6 HOURS PRN
Status: DISCONTINUED | OUTPATIENT
Start: 2024-01-24 | End: 2024-01-30 | Stop reason: HOSPADM

## 2024-01-24 RX ORDER — ASPIRIN 325 MG
325 TABLET, DELAYED RELEASE (ENTERIC COATED) ORAL 2 TIMES DAILY
Status: DISCONTINUED | OUTPATIENT
Start: 2024-01-24 | End: 2024-01-30 | Stop reason: HOSPADM

## 2024-01-24 RX ORDER — ONDANSETRON 4 MG/1
4 TABLET, ORALLY DISINTEGRATING ORAL EVERY 8 HOURS PRN
Status: DISCONTINUED | OUTPATIENT
Start: 2024-01-24 | End: 2024-01-30 | Stop reason: HOSPADM

## 2024-01-24 RX ORDER — NEOSTIGMINE METHYLSULFATE 1 MG/ML
INJECTION, SOLUTION INTRAVENOUS PRN
Status: DISCONTINUED | OUTPATIENT
Start: 2024-01-24 | End: 2024-01-24 | Stop reason: SDUPTHER

## 2024-01-24 RX ORDER — CELECOXIB 100 MG/1
200 CAPSULE ORAL DAILY
Status: DISCONTINUED | OUTPATIENT
Start: 2024-01-24 | End: 2024-01-30 | Stop reason: HOSPADM

## 2024-01-24 RX ORDER — TRAZODONE HYDROCHLORIDE 50 MG/1
300 TABLET ORAL NIGHTLY
Status: DISCONTINUED | OUTPATIENT
Start: 2024-01-24 | End: 2024-01-30 | Stop reason: HOSPADM

## 2024-01-24 RX ORDER — TORSEMIDE 20 MG/1
50 TABLET ORAL DAILY
Status: DISCONTINUED | OUTPATIENT
Start: 2024-01-24 | End: 2024-01-30 | Stop reason: HOSPADM

## 2024-01-24 RX ORDER — DOXEPIN HYDROCHLORIDE 50 MG/1
150 CAPSULE ORAL NIGHTLY
Status: DISCONTINUED | OUTPATIENT
Start: 2024-01-24 | End: 2024-01-30 | Stop reason: HOSPADM

## 2024-01-24 RX ORDER — HYDROXYCHLOROQUINE SULFATE 200 MG/1
200 TABLET, FILM COATED ORAL 2 TIMES DAILY
Status: DISCONTINUED | OUTPATIENT
Start: 2024-01-24 | End: 2024-01-30 | Stop reason: HOSPADM

## 2024-01-24 RX ORDER — FOLIC ACID 1 MG/1
1 TABLET ORAL DAILY
Status: DISCONTINUED | OUTPATIENT
Start: 2024-01-24 | End: 2024-01-30 | Stop reason: HOSPADM

## 2024-01-24 RX ORDER — SODIUM CHLORIDE 0.9 % (FLUSH) 0.9 %
5-40 SYRINGE (ML) INJECTION EVERY 12 HOURS SCHEDULED
Status: DISCONTINUED | OUTPATIENT
Start: 2024-01-24 | End: 2024-01-30 | Stop reason: HOSPADM

## 2024-01-24 RX ORDER — METOLAZONE 5 MG/1
5 TABLET ORAL DAILY PRN
Status: DISCONTINUED | OUTPATIENT
Start: 2024-01-24 | End: 2024-01-30 | Stop reason: HOSPADM

## 2024-01-24 RX ORDER — SUCCINYLCHOLINE/SOD CL,ISO/PF 200MG/10ML
SYRINGE (ML) INTRAVENOUS PRN
Status: DISCONTINUED | OUTPATIENT
Start: 2024-01-24 | End: 2024-01-24 | Stop reason: SDUPTHER

## 2024-01-24 RX ORDER — PREDNISONE 5 MG/1
5 TABLET ORAL DAILY
Status: DISCONTINUED | OUTPATIENT
Start: 2024-01-24 | End: 2024-01-30 | Stop reason: HOSPADM

## 2024-01-24 RX ORDER — AMLODIPINE BESYLATE 2.5 MG/1
2.5 TABLET ORAL DAILY
Status: DISCONTINUED | OUTPATIENT
Start: 2024-01-24 | End: 2024-01-30 | Stop reason: HOSPADM

## 2024-01-24 RX ORDER — FERROUS SULFATE 325(65) MG
325 TABLET ORAL
Status: DISCONTINUED | OUTPATIENT
Start: 2024-01-24 | End: 2024-01-30 | Stop reason: HOSPADM

## 2024-01-24 RX ORDER — PROPOFOL 10 MG/ML
INJECTION, EMULSION INTRAVENOUS PRN
Status: DISCONTINUED | OUTPATIENT
Start: 2024-01-24 | End: 2024-01-24 | Stop reason: SDUPTHER

## 2024-01-24 RX ORDER — MORPHINE SULFATE 2 MG/ML
4 INJECTION, SOLUTION INTRAMUSCULAR; INTRAVENOUS
Status: DISCONTINUED | OUTPATIENT
Start: 2024-01-24 | End: 2024-01-30 | Stop reason: HOSPADM

## 2024-01-24 RX ORDER — ROCURONIUM BROMIDE 10 MG/ML
INJECTION, SOLUTION INTRAVENOUS PRN
Status: DISCONTINUED | OUTPATIENT
Start: 2024-01-24 | End: 2024-01-24 | Stop reason: SDUPTHER

## 2024-01-24 RX ORDER — GLYCOPYRROLATE 0.2 MG/ML
INJECTION INTRAMUSCULAR; INTRAVENOUS PRN
Status: DISCONTINUED | OUTPATIENT
Start: 2024-01-24 | End: 2024-01-24 | Stop reason: SDUPTHER

## 2024-01-24 RX ORDER — DEXAMETHASONE SODIUM PHOSPHATE 4 MG/ML
INJECTION, SOLUTION INTRA-ARTICULAR; INTRALESIONAL; INTRAMUSCULAR; INTRAVENOUS; SOFT TISSUE PRN
Status: DISCONTINUED | OUTPATIENT
Start: 2024-01-24 | End: 2024-01-24 | Stop reason: SDUPTHER

## 2024-01-24 RX ORDER — METOPROLOL SUCCINATE 25 MG/1
25 TABLET, EXTENDED RELEASE ORAL DAILY
Status: DISCONTINUED | OUTPATIENT
Start: 2024-01-24 | End: 2024-01-30 | Stop reason: HOSPADM

## 2024-01-24 RX ORDER — ONDANSETRON 2 MG/ML
INJECTION INTRAMUSCULAR; INTRAVENOUS PRN
Status: DISCONTINUED | OUTPATIENT
Start: 2024-01-24 | End: 2024-01-24 | Stop reason: SDUPTHER

## 2024-01-24 RX ORDER — SODIUM CHLORIDE 9 MG/ML
INJECTION, SOLUTION INTRAVENOUS PRN
Status: DISCONTINUED | OUTPATIENT
Start: 2024-01-24 | End: 2024-01-30 | Stop reason: HOSPADM

## 2024-01-24 RX ORDER — SODIUM CHLORIDE 0.9 % (FLUSH) 0.9 %
5-40 SYRINGE (ML) INJECTION PRN
Status: DISCONTINUED | OUTPATIENT
Start: 2024-01-24 | End: 2024-01-30 | Stop reason: HOSPADM

## 2024-01-24 RX ADMIN — TUBERCULIN PURIFIED PROTEIN DERIVATIVE 5 UNITS: 5 INJECTION, SOLUTION INTRADERMAL at 08:13

## 2024-01-24 RX ADMIN — SODIUM CHLORIDE, SODIUM LACTATE, POTASSIUM CHLORIDE, AND CALCIUM CHLORIDE: 600; 310; 30; 20 INJECTION, SOLUTION INTRAVENOUS at 21:13

## 2024-01-24 RX ADMIN — PHENYLEPHRINE HYDROCHLORIDE 100 MCG: 10 INJECTION INTRAVENOUS at 15:29

## 2024-01-24 RX ADMIN — FENTANYL CITRATE 50 MCG: 50 INJECTION, SOLUTION INTRAMUSCULAR; INTRAVENOUS at 15:00

## 2024-01-24 RX ADMIN — FENTANYL CITRATE 50 MCG: 50 INJECTION, SOLUTION INTRAMUSCULAR; INTRAVENOUS at 15:26

## 2024-01-24 RX ADMIN — HYDROXYCHLOROQUINE SULFATE 200 MG: 200 TABLET ORAL at 20:53

## 2024-01-24 RX ADMIN — PREGABALIN 75 MG: 75 CAPSULE ORAL at 08:11

## 2024-01-24 RX ADMIN — AMLODIPINE BESYLATE 2.5 MG: 2.5 TABLET ORAL at 08:11

## 2024-01-24 RX ADMIN — MORPHINE SULFATE 2 MG: 2 INJECTION, SOLUTION INTRAMUSCULAR; INTRAVENOUS at 10:01

## 2024-01-24 RX ADMIN — ROCURONIUM BROMIDE 5 MG: 10 INJECTION, SOLUTION INTRAVENOUS at 15:00

## 2024-01-24 RX ADMIN — METOPROLOL SUCCINATE 25 MG: 25 TABLET, EXTENDED RELEASE ORAL at 08:11

## 2024-01-24 RX ADMIN — DEXAMETHASONE SODIUM PHOSPHATE 4 MG: 4 INJECTION, SOLUTION INTRAMUSCULAR; INTRAVENOUS at 15:22

## 2024-01-24 RX ADMIN — Medication 3000 MG: at 15:13

## 2024-01-24 RX ADMIN — SODIUM CHLORIDE, PRESERVATIVE FREE 10 ML: 5 INJECTION INTRAVENOUS at 08:52

## 2024-01-24 RX ADMIN — CEFAZOLIN 3000 MG: 10 INJECTION, POWDER, FOR SOLUTION INTRAVENOUS at 23:54

## 2024-01-24 RX ADMIN — ONDANSETRON 4 MG: 2 INJECTION INTRAMUSCULAR; INTRAVENOUS at 15:22

## 2024-01-24 RX ADMIN — TRAMADOL HYDROCHLORIDE 50 MG: 50 TABLET ORAL at 08:12

## 2024-01-24 RX ADMIN — TAMSULOSIN HYDROCHLORIDE 0.4 MG: 0.4 CAPSULE ORAL at 08:11

## 2024-01-24 RX ADMIN — LORAZEPAM 2 MG: 1 TABLET ORAL at 20:53

## 2024-01-24 RX ADMIN — Medication 3 MG: at 15:56

## 2024-01-24 RX ADMIN — PREDNISONE 5 MG: 5 TABLET ORAL at 08:11

## 2024-01-24 RX ADMIN — MORPHINE SULFATE 4 MG: 2 INJECTION, SOLUTION INTRAMUSCULAR; INTRAVENOUS at 20:49

## 2024-01-24 RX ADMIN — ESCITALOPRAM OXALATE 10 MG: 10 TABLET ORAL at 08:11

## 2024-01-24 RX ADMIN — SODIUM CHLORIDE, SODIUM LACTATE, POTASSIUM CHLORIDE, AND CALCIUM CHLORIDE: 600; 310; 30; 20 INJECTION, SOLUTION INTRAVENOUS at 16:16

## 2024-01-24 RX ADMIN — CELECOXIB 200 MG: 100 CAPSULE ORAL at 08:12

## 2024-01-24 RX ADMIN — HYDROCODONE BITARTRATE AND ACETAMINOPHEN 2 TABLET: 5; 325 TABLET ORAL at 19:26

## 2024-01-24 RX ADMIN — PREGABALIN 75 MG: 75 CAPSULE ORAL at 20:53

## 2024-01-24 RX ADMIN — DOXEPIN HYDROCHLORIDE 150 MG: 50 CAPSULE ORAL at 20:53

## 2024-01-24 RX ADMIN — PROPOFOL 150 MG: 10 INJECTION, EMULSION INTRAVENOUS at 15:00

## 2024-01-24 RX ADMIN — PHENYLEPHRINE HYDROCHLORIDE 300 MCG: 10 INJECTION INTRAVENOUS at 15:21

## 2024-01-24 RX ADMIN — ASPIRIN 325 MG: 325 TABLET, COATED ORAL at 20:53

## 2024-01-24 RX ADMIN — HYDROXYCHLOROQUINE SULFATE 200 MG: 200 TABLET ORAL at 08:12

## 2024-01-24 RX ADMIN — LIDOCAINE HYDROCHLORIDE 80 MG: 20 INJECTION, SOLUTION EPIDURAL; INFILTRATION; INTRACAUDAL; PERINEURAL at 15:00

## 2024-01-24 RX ADMIN — PHENYLEPHRINE HYDROCHLORIDE 200 MCG: 10 INJECTION INTRAVENOUS at 15:15

## 2024-01-24 RX ADMIN — Medication 3 MG: at 20:53

## 2024-01-24 RX ADMIN — GLYCOPYRROLATE 0.4 MG: 0.2 INJECTION INTRAMUSCULAR; INTRAVENOUS at 15:56

## 2024-01-24 RX ADMIN — Medication 200 MG: at 15:00

## 2024-01-24 ASSESSMENT — PAIN DESCRIPTION - LOCATION
LOCATION: LEG
LOCATION: LEG
LOCATION: HIP

## 2024-01-24 ASSESSMENT — PAIN DESCRIPTION - DESCRIPTORS
DESCRIPTORS: ACHING
DESCRIPTORS: ACHING
DESCRIPTORS: ACHING;SORE;THROBBING

## 2024-01-24 ASSESSMENT — PAIN SCALES - GENERAL
PAINLEVEL_OUTOF10: 8
PAINLEVEL_OUTOF10: 0
PAINLEVEL_OUTOF10: 7
PAINLEVEL_OUTOF10: 7
PAINLEVEL_OUTOF10: 2

## 2024-01-24 ASSESSMENT — PAIN DESCRIPTION - ORIENTATION
ORIENTATION: LEFT
ORIENTATION: RIGHT;UPPER
ORIENTATION: LEFT

## 2024-01-24 ASSESSMENT — PAIN - FUNCTIONAL ASSESSMENT
PAIN_FUNCTIONAL_ASSESSMENT: 0-10
PAIN_FUNCTIONAL_ASSESSMENT: 0-10
PAIN_FUNCTIONAL_ASSESSMENT: NONE - DENIES PAIN

## 2024-01-24 NOTE — INTERVAL H&P NOTE
Update History & Physical    The Patient's History and Physical of 1/23/2024 was reviewed with the patient and I examined the patient.  There was no change.  The surgical site was confirmed by the patient and me.    Plan:  The risk, benefits, expected outcome, and alternative to the recommended procedure have been discussed with the patient.  Patient understands and wants to proceed with open treatment of left femur fracture with intramedullary nail fixation.    Electronically signed by Carter Braga MD on 1/24/2024 at 12:26 PM

## 2024-01-24 NOTE — PROGRESS NOTES
Hospitalist Progress Note   Admit Date:  2024  7:30 PM   Name:  Ama Chu   Age:  73 y.o.  Sex:  female  :  1950   MRN:  [id number]   Room:  [facility]/    Presenting/Chief Complaint: No chief complaint on file.     Reason(s) for Admission: Closed disp comminuted fracture of shaft of left femur with malunion [S72.352P]     Hospital Course:   Ama Chu is a 73 y.o. female with medical history of morbid obesity, rheumatoid arthritis, neuropathy, hypertension, depression who presented with left lower leg pain that has persisted for the past week after sliding off her couch.  Patient was evaluated by her orthopedic surgeon who noted that she had a left displaced femur fracture and recommended patient go to the ER to be admitted for surgery.  Patient reports that she suffered the injury approximately 1 week ago.  She was sitting on the couch and was attempting to reach for something when she slid off of it and hurt her left leg.  Since then, she has had pain and was unable to bear weight.  She denied any worsening numbness, weakness in the lower extremity.  She denies any current fevers, chills, shortness of breath, chest pain, nausea, vomiting.     In the ER, patient had x-ray of the left femur which showed a displaced left femur fracture.  As per ER physician, case was discussed with orthopedic surgeon who recommended transfer to [facility] for surgery tomorrow with Dr. Braga's team.     Patient currently lives with her daughter.  She is independent in her ADLs and IADLs.  Recently, she has been using a wheelchair to get around she has had difficulty walking.  She is a former smoker, nondrinker, nondrug use.       Subjective & 24hr Events:   Patient was seen and examined at the bedside.  Family was at the bedside.  She was complaining of left hip pain.  Patient denies fever, chills, nausea, vomiting, diarrhea.      Assessment & Plan:   Left femur fracture  Leukocytosis mildly

## 2024-01-24 NOTE — ANESTHESIA PROCEDURE NOTES
Airway  Date/Time: 1/24/2024 3:03 PM  Urgency: elective    Airway not difficult    General Information and Staff    Patient location during procedure: OR  Resident/CRNA: Yessica Slater APRN - CRNA  Performed: resident/CRNA   Performed by: Yessica Slater APRN - CRNA  Authorized by: Yessica Slater APRN - CRNA      Indications and Patient Condition  Indications for airway management: anesthesia  Spontaneous Ventilation: absent  Sedation level: deep  Preoxygenated: yes  Patient position: sniffing  MILS not maintained throughout  Mask difficulty assessment: vent by bag mask    Final Airway Details  Final airway type: endotracheal airway      Successful airway: ETT  Cuffed: yes   Successful intubation technique: direct laryngoscopy  Facilitating devices/methods: intubating stylet  Endotracheal tube insertion site: oral  Blade: Nadege  Blade size: #4  ETT size (mm): 7.0  Cormack-Lehane Classification: grade I - full view of glottis  Placement verified by: chest auscultation and capnometry   Measured from: lips  Number of attempts at approach: 1  Ventilation between attempts: bag mask  Number of other approaches attempted: 0    no

## 2024-01-24 NOTE — OP NOTE
Operative Report    Patient: Ama Chu MRN: [id number]  SSN: [id number]    YOB: 1950  Age: 73 y.o.  Sex: female       Date of Surgery: January 24, 2024     History:  Ama Chu is a 73 y.o. female who fell several days ago.  She eventually went to see her orthopedic surgeon.  The orthopedic surgeon actually x-rayed her and diagnosed her with a femur fracture but then instead of taking care of this told her to go to the emergency room and she made her way to our hospital.  She was seen and admitted with a left femoral shaft fracture.  Her BMI is 52 and she really does not ambulate at all she does a little bit of transferring.  She does have a incredibly arthritic left knee and she has this distal third comminuted femur fracture.  I talked her about the need for operative intervention of this and that I would likely do retrograde intramedullary nail fixation I try to explain exactly what this would involve.  She seemed understand.      I talked to the patient and/or their representative and explained the exact nature the procedure.  I also went through a detailed list of the material risks associated with  the procedure which included risk of bleeding, infection, injury to nearby structures, worsening the situation, as well as the risks associate with anesthesia and finally death.  Also talked with him regarding the benefits and alternatives to the procedure.    Preoperative Diagnosis: Closed displaced left femoral shaft fracture    Postoperative Diagnosis:   Closed displaced comminuted left femoral shaft fracture      Surgeon(s) and Role:     * Carter Braga MD - Primary    Anesthesia: General     Procedure: Retrograde intramedullary nail fixation of left femoral shaft fracture    Procedure in Detail: After the successful duction of general anesthetic the left lower extremity was prepped and draped as best we could as the girth of her leg made it very difficult to prep this and maintain any  sort of reasonable sterile field.  I was able to make an incision distally with the help of radiographic guidance as it was not really possible to even palpate her patella and then I placed a guidewire in the center of the distal femur on both AP and lateral projection.  Once this was in appropriate position and confirmed I then opened the distal femur with a opening reamer and then placed a reduction tool into the distal femur and this was eventually placed across the fracture site into the proximal portion of the fracture and the guidewire was placed across the fracture.  I then measured for 3 and 80 mm nail and then reamed to 13 mm then placed a 12 x 380 mm femoral nail.  Once the nail was in appropriate position I placed 2 interlock bolts 1 more distal 1 more proximal from lateral to medial and then 2 oblique interlock bolts in the distal femur as well.  These were all placed using the outrigger device.  Once we felt we had adequate fixation with all of these I then placed a single anterior to posterior interlock bolt proximally using freehand technique.  Once I done this I removed the insertion handle closed my incisions with 2 point 0 Monocryl and staples a bulky dressing was applied the patient was awakened taken to cover room in stable condition      Estimated Blood Loss: 200 cc    Tourniquet Time: * No tourniquets in log *      Implants:   Implant Name Type Inv. Item Serial No.  Lot No. LRB No. Used Action   NAIL RFNA 12MMX 380MM 5 DEG BEND/ ST - [id number]  NAIL RFNA 12MMX 380MM 5 DEG BEND/ ST  [employer] [id number] Left 1 Implanted   SCREW LK F/IM NAIL 5X72MM XL25 STERILE - [id number]  SCREW LK F/IM NAIL 5X72MM XL25 STERILE  [employer] [id number] Left 1 Implanted   SCREW BNE LCK 5X80 MM TI STRL RFNADVANCED [id number] - [id number]  SCREW BNE LCK 5X80 MM TI STRL RFNADVANCED [id number]  [employer] [id number] Left 1 Implanted   SCREW BNE LCK 5X64 MM XL25 RETROGRADE TI STRL

## 2024-01-24 NOTE — PROGRESS NOTES
TRANSFER - IN REPORT:    Verbal report received from CANDICE Martines on Ama Chu  being received from PACU for routine progression of patient care      Report consisted of patient's Situation, Background, Assessment and   Recommendations(SBAR).     Information from the following report(s) Nurse Handoff Report was reviewed with the receiving nurse.    Opportunity for questions and clarification was provided.      Assessment completed upon patient's arrival to unit and care assumed.

## 2024-01-24 NOTE — ANESTHESIA PRE PROCEDURE
Department of Anesthesiology  Preprocedure Note       Name:  Ama Chu   Age:  73 y.o.  :  1950                                          MRN:  [id number]         Date:  2024      Surgeon: Surgeon(s):  Carter Braga MD    Procedure: Procedure(s):  LEFT FEMUR IM NAIL RUFUS INSERTION RETROGRADE    Medications prior to admission:   Prior to Admission medications    Medication Sig Start Date End Date Taking? Authorizing Provider   LORazepam (ATIVAN) 2 MG tablet Take 1 tablet by mouth nightly. Max Daily Amount: 2 mg   Yes Anita Vee MD   folic acid (FOLVITE) 1 MG tablet Take 1 tablet by mouth daily   Yes Anita Vee MD   celecoxib (CELEBREX) 200 MG capsule Take 1 capsule by mouth daily   Yes Anita Vee MD   HYDROcodone-acetaminophen (NORCO) 5-325 MG per tablet Take 1 tablet by mouth 2 times daily as needed for Pain. Max Daily Amount: 2 tablets   Yes Anita Vee MD   melatonin 3 MG TABS tablet Take 1 tablet by mouth nightly   Yes Anita Vee MD   amLODIPine (NORVASC) 2.5 MG tablet Take 1 tablet by mouth daily    Anita Vee MD   doxepin (SINEQUAN) 100 MG capsule Take 150 mg by mouth nightly    Anita Vee MD   ergocalciferol (ERGOCALCIFEROL) 1.25 MG (66416 UT) capsule Take 1 capsule by mouth once a week    Anita Vee MD   escitalopram (LEXAPRO) 10 MG tablet Take 1 tablet by mouth daily    Anita Vee MD   ferrous sulfate (IRON 325) 325 (65 Fe) MG tablet Take 1 tablet by mouth daily (with breakfast)    Anita Vee MD   hydroxychloroquine (PLAQUENIL) 200 MG tablet Take 1 tablet by mouth 2 times daily    Anita Vee MD   metOLazone (ZAROXOLYN) 5 MG tablet Take 1 tablet by mouth daily as needed (for weight gain more than 2 lbs in 3 days)    Anita Vee MD   metoprolol succinate (TOPROL XL) 25 MG extended release tablet Take 1 tablet by mouth daily    Anita Vee MD   tamsulosin

## 2024-01-24 NOTE — PERIOP NOTE
Patient called asking to cancel her NM Stress test.  Number was given to the patient to contact cardiac scheduling. She is cancelling as she has an appointment to see a Plastic Surgeon. She broke her nose about 2 years ago and wanted to have that repaired prior to her stress test. She said that she thinks it affects her breathing.    TRANSFER - OUT REPORT:    Verbal report given to Lance Irvin on Ama Chu  being transferred to [facility] for routine post-op       Report consisted of patient’s Situation, Background, Assessment and   Recommendations(SBAR).     Information from the following report(s) Nurse Handoff Report, Adult Overview, Surgery Report, Intake/Output, and MAR was reviewed with the receiving nurse.    Lines:   Peripheral IV 01/24/24 Left Forearm (Active)   Site Assessment Clean, dry & intact 01/24/24 1615   Line Status Infusing 01/24/24 1615   Line Care Connections checked and tightened 01/24/24 1615   Phlebitis Assessment No symptoms 01/24/24 1615   Infiltration Assessment 0 01/24/24 1615   Alcohol Cap Used No 01/24/24 1615   Dressing Status Clean, dry & intact 01/24/24 1615   Dressing Type Transparent 01/24/24 1615        Opportunity for questions and clarification was provided.      Patient transported with:   O2 @ 2 liters    VTE prophylaxis orders have been written for Ama Chu.    Patient and family given floor number and nurses name.  Family updated re: pt status after security code verified.

## 2024-01-24 NOTE — PLAN OF CARE
Problem: Discharge Planning  Goal: Discharge to home or other facility with appropriate resources  Outcome: Progressing     Problem: Skin/Tissue Integrity  Goal: Absence of new skin breakdown  Description: 1.  Monitor for areas of redness and/or skin breakdown  2.  Assess vascular access sites hourly  3.  Every 4-6 hours minimum:  Change oxygen saturation probe site  4.  Every 4-6 hours:  If on nasal continuous positive airway pressure, respiratory therapy assess nares and determine need for appliance change or resting period.  Outcome: Progressing     Problem: ABCDS Injury Assessment  Goal: Absence of physical injury  Outcome: Progressing     Problem: Safety - Adult  Goal: Free from fall injury  Outcome: Progressing

## 2024-01-25 LAB
ANION GAP SERPL CALC-SCNC: 2 MMOL/L (ref 2–11)
BASOPHILS # BLD: 0 K/UL (ref 0–0.2)
BASOPHILS NFR BLD: 0 % (ref 0–2)
BUN SERPL-MCNC: 12 MG/DL (ref 8–23)
CALCIUM SERPL-MCNC: 8.6 MG/DL (ref 8.3–10.4)
CHLORIDE SERPL-SCNC: 101 MMOL/L (ref 103–113)
CO2 SERPL-SCNC: 32 MMOL/L (ref 21–32)
CREAT SERPL-MCNC: 0.7 MG/DL (ref 0.6–1)
DIFFERENTIAL METHOD BLD: ABNORMAL
EOSINOPHIL # BLD: 0 K/UL (ref 0–0.8)
EOSINOPHIL NFR BLD: 0 % (ref 0.5–7.8)
ERYTHROCYTE [DISTWIDTH] IN BLOOD BY AUTOMATED COUNT: 16.7 % (ref 11.9–14.6)
GLUCOSE SERPL-MCNC: 168 MG/DL (ref 65–100)
HCT VFR BLD AUTO: 27.2 % (ref 35.8–46.3)
HGB BLD-MCNC: 8.3 G/DL (ref 11.7–15.4)
IMM GRANULOCYTES # BLD AUTO: 0.1 K/UL (ref 0–0.5)
IMM GRANULOCYTES NFR BLD AUTO: 1 % (ref 0–5)
LYMPHOCYTES # BLD: 0.5 K/UL (ref 0.5–4.6)
LYMPHOCYTES NFR BLD: 4 % (ref 13–44)
MCH RBC QN AUTO: 29.3 PG (ref 26.1–32.9)
MCHC RBC AUTO-ENTMCNC: 30.5 G/DL (ref 31.4–35)
MCV RBC AUTO: 96.1 FL (ref 82–102)
MM INDURATION, POC: 0 MM (ref 0–5)
MONOCYTES # BLD: 0.9 K/UL (ref 0.1–1.3)
MONOCYTES NFR BLD: 7 % (ref 4–12)
NEUTS SEG # BLD: 11.6 K/UL (ref 1.7–8.2)
NEUTS SEG NFR BLD: 88 % (ref 43–78)
NRBC # BLD: 0 K/UL (ref 0–0.2)
PHOSPHATE SERPL-MCNC: 3.1 MG/DL (ref 2.3–3.7)
PLATELET # BLD AUTO: 264 K/UL (ref 150–450)
PMV BLD AUTO: 11 FL (ref 9.4–12.3)
POTASSIUM SERPL-SCNC: 3.9 MMOL/L (ref 3.5–5.1)
PPD, POC: NEGATIVE
RBC # BLD AUTO: 2.83 M/UL (ref 4.05–5.2)
SODIUM SERPL-SCNC: 135 MMOL/L (ref 136–146)
WBC # BLD AUTO: 13.1 K/UL (ref 4.3–11.1)

## 2024-01-25 PROCEDURE — 6370000000 HC RX 637 (ALT 250 FOR IP): Performed by: STUDENT IN AN ORGANIZED HEALTH CARE EDUCATION/TRAINING PROGRAM

## 2024-01-25 PROCEDURE — 80048 BASIC METABOLIC PNL TOTAL CA: CPT

## 2024-01-25 PROCEDURE — 6370000000 HC RX 637 (ALT 250 FOR IP): Performed by: INTERNAL MEDICINE

## 2024-01-25 PROCEDURE — 1100000000 HC RM PRIVATE

## 2024-01-25 PROCEDURE — 6370000000 HC RX 637 (ALT 250 FOR IP): Performed by: ORTHOPAEDIC SURGERY

## 2024-01-25 PROCEDURE — 97530 THERAPEUTIC ACTIVITIES: CPT

## 2024-01-25 PROCEDURE — 97166 OT EVAL MOD COMPLEX 45 MIN: CPT

## 2024-01-25 PROCEDURE — 97162 PT EVAL MOD COMPLEX 30 MIN: CPT

## 2024-01-25 PROCEDURE — 2580000003 HC RX 258: Performed by: INTERNAL MEDICINE

## 2024-01-25 PROCEDURE — 6360000002 HC RX W HCPCS: Performed by: ORTHOPAEDIC SURGERY

## 2024-01-25 PROCEDURE — 36415 COLL VENOUS BLD VENIPUNCTURE: CPT

## 2024-01-25 PROCEDURE — 84100 ASSAY OF PHOSPHORUS: CPT

## 2024-01-25 PROCEDURE — 97112 NEUROMUSCULAR REEDUCATION: CPT

## 2024-01-25 PROCEDURE — 2580000003 HC RX 258: Performed by: ORTHOPAEDIC SURGERY

## 2024-01-25 PROCEDURE — 85025 COMPLETE CBC W/AUTO DIFF WBC: CPT

## 2024-01-25 RX ADMIN — CELECOXIB 200 MG: 100 CAPSULE ORAL at 09:40

## 2024-01-25 RX ADMIN — ASPIRIN 325 MG: 325 TABLET, COATED ORAL at 21:35

## 2024-01-25 RX ADMIN — ASPIRIN 325 MG: 325 TABLET, COATED ORAL at 09:39

## 2024-01-25 RX ADMIN — MORPHINE SULFATE 4 MG: 2 INJECTION, SOLUTION INTRAMUSCULAR; INTRAVENOUS at 16:55

## 2024-01-25 RX ADMIN — PREDNISONE 5 MG: 5 TABLET ORAL at 09:39

## 2024-01-25 RX ADMIN — SODIUM CHLORIDE, PRESERVATIVE FREE 10 ML: 5 INJECTION INTRAVENOUS at 09:44

## 2024-01-25 RX ADMIN — HYDROXYCHLOROQUINE SULFATE 200 MG: 200 TABLET ORAL at 21:35

## 2024-01-25 RX ADMIN — PREGABALIN 75 MG: 75 CAPSULE ORAL at 21:35

## 2024-01-25 RX ADMIN — HYDROCODONE BITARTRATE AND ACETAMINOPHEN 2 TABLET: 5; 325 TABLET ORAL at 21:35

## 2024-01-25 RX ADMIN — HYDROXYCHLOROQUINE SULFATE 200 MG: 200 TABLET ORAL at 09:39

## 2024-01-25 RX ADMIN — FOLIC ACID 1 MG: 1 TABLET ORAL at 09:39

## 2024-01-25 RX ADMIN — SODIUM CHLORIDE, PRESERVATIVE FREE 10 ML: 5 INJECTION INTRAVENOUS at 09:45

## 2024-01-25 RX ADMIN — POLYETHYLENE GLYCOL 3350 17 G: 17 POWDER, FOR SOLUTION ORAL at 21:34

## 2024-01-25 RX ADMIN — TAMSULOSIN HYDROCHLORIDE 0.4 MG: 0.4 CAPSULE ORAL at 09:41

## 2024-01-25 RX ADMIN — Medication 3 MG: at 21:35

## 2024-01-25 RX ADMIN — FERROUS SULFATE TAB 325 MG (65 MG ELEMENTAL FE) 325 MG: 325 (65 FE) TAB at 09:39

## 2024-01-25 RX ADMIN — AMLODIPINE BESYLATE 2.5 MG: 2.5 TABLET ORAL at 09:41

## 2024-01-25 RX ADMIN — TRAZODONE HYDROCHLORIDE 300 MG: 50 TABLET ORAL at 21:34

## 2024-01-25 RX ADMIN — TORSEMIDE 50 MG: 20 TABLET ORAL at 09:41

## 2024-01-25 RX ADMIN — SODIUM CHLORIDE, SODIUM LACTATE, POTASSIUM CHLORIDE, AND CALCIUM CHLORIDE: 600; 310; 30; 20 INJECTION, SOLUTION INTRAVENOUS at 06:09

## 2024-01-25 RX ADMIN — SODIUM CHLORIDE, PRESERVATIVE FREE 10 ML: 5 INJECTION INTRAVENOUS at 23:28

## 2024-01-25 RX ADMIN — LORAZEPAM 2 MG: 1 TABLET ORAL at 21:34

## 2024-01-25 RX ADMIN — ESCITALOPRAM OXALATE 10 MG: 10 TABLET ORAL at 09:39

## 2024-01-25 RX ADMIN — CEFAZOLIN 3000 MG: 10 INJECTION, POWDER, FOR SOLUTION INTRAVENOUS at 06:03

## 2024-01-25 RX ADMIN — PREGABALIN 75 MG: 75 CAPSULE ORAL at 09:40

## 2024-01-25 RX ADMIN — DOXEPIN HYDROCHLORIDE 150 MG: 50 CAPSULE ORAL at 21:35

## 2024-01-25 RX ADMIN — METOPROLOL SUCCINATE 25 MG: 25 TABLET, EXTENDED RELEASE ORAL at 09:39

## 2024-01-25 ASSESSMENT — PAIN DESCRIPTION - LOCATION
LOCATION: LEG
LOCATION: HIP
LOCATION: HIP

## 2024-01-25 ASSESSMENT — PAIN DESCRIPTION - ORIENTATION
ORIENTATION: MID
ORIENTATION: ANTERIOR;LEFT
ORIENTATION: LEFT

## 2024-01-25 ASSESSMENT — PAIN SCALES - GENERAL
PAINLEVEL_OUTOF10: 6
PAINLEVEL_OUTOF10: 8
PAINLEVEL_OUTOF10: 5
PAINLEVEL_OUTOF10: 8

## 2024-01-25 ASSESSMENT — PAIN DESCRIPTION - DESCRIPTORS
DESCRIPTORS: ACHING
DESCRIPTORS: ACHING;DISCOMFORT;POUNDING

## 2024-01-25 ASSESSMENT — PAIN - FUNCTIONAL ASSESSMENT: PAIN_FUNCTIONAL_ASSESSMENT: ACTIVITIES ARE NOT PREVENTED

## 2024-01-25 NOTE — PROGRESS NOTES
[facility]        2024         Post Op day: 1 Day Post-Op Procedure(s) (LRB):  LEFT FEMUR IM NAIL RUFUS INSERTION RETROGRADE (Left)      Admit Date: 2024  Admit Diagnosis: Closed disp comminuted fracture of shaft of left femur with malunion [S72.352P]       Principle Problem: Closed disp comminuted fracture of shaft of left femur with malunion.           Subjective: Doing well, No complaints, No SOB, No Chest Pain, No Nausea or Vomiting     Objective:   Vital Signs are Stable, No Acute Distress, Alert,  Dressing is Dry,  Neurovascular exam is normal.     Assessment / Plan :  Patient Active Problem List   Diagnosis    Closed disp comminuted fracture of shaft of left femur with malunion    Rheumatoid arthritis ([facility])    Hypertension    Depression    Neuropathy    CKD (chronic kidney disease) stage 3, GFR 30-59 ml/min ([facility])    Patient Vitals for the past 8 hrs:   BP Temp Temp src Pulse Resp SpO2   24 0939 130/66 -- -- 91 -- --   24 0818 130/66 98.4 °F (36.9 °C) Oral 91 17 91 %   24 0540 124/62 98.1 °F (36.7 °C) Oral 94 16 91 %    Temp (24hrs), Av.3 °F (36.8 °C), Min:98 °F (36.7 °C), Max:98.4 °F (36.9 °C)    Body mass index is 52.42 kg/m².    Lab Results   Component Value Date/Time    HGB 8.3 2024 05:46 AM          S/P Procedure(s) (LRB):  LEFT FEMUR IM NAIL RUFUS INSERTION RETROGRADE (Left)      Vit D 2000iu daily x 12 weeks   Medical Mgmt per hospitalist  Anticoagulation plan: ASA 325mg daily  Continue PT, wBAT for transfers only - do not force weight  Dressing change: dressing change today as discussed with RN  Fall Precautions  DC disp: home vs rehab  F/U: 2 weeks postop for wound check and staple removals       Signed By: RAFFY Taylor  2024,  11:47 AM

## 2024-01-25 NOTE — PROGRESS NOTES
ACUTE PHYSICAL THERAPY GOALS:   (Developed with and agreed upon by patient and/or caregiver.)    (1.) Ama Chu  will move from supine to sit and sit to supine  with MINIMAL ASSIST within 7 treatment day(s).    (2.) Ama Chu will transfer from bed to chair and chair to bed with MINIMAL ASSIST using the least restrictive device within 7 treatment day(s).    (3.) Ama Chu will perform sitting static and dynamic balance activities x 20 minutes with SUPERVISION to improve safety within 7 treatment day(s).  (4.) Ama Chu will perform therapeutic exercises x 20 min for HEP with INDEPENDENCE to improve strength, endurance, and functional mobility within 7 treatment day(s).       PHYSICAL THERAPY Initial Assessment, Daily Note, and AM  (Link to Caseload Tracking: PT Visit Days : 1  Acknowledge Orders  Time In/Out  PT Charge Capture  Rehab Caseload Tracker    WBAT FOR TRANSFERS ONLY    Ama Chu is a 73 y.o. female   PRIMARY DIAGNOSIS: Closed disp comminuted fracture of shaft of left femur with malunion  Closed disp comminuted fracture of shaft of left femur with malunion [S72.352P]  Procedure(s) (LRB):  LEFT FEMUR IM NAIL RUFUS INSERTION RETROGRADE (Left)  1 Day Post-Op  Reason for Referral: Generalized Muscle Weakness (M62.81)  Other abnormalities of gait and mobility (R26.89)  History of falling (Z91.81)  Inpatient: Payor: MEDICARE / Plan: MEDICARE PART A AND B / Product Type: *No Product type* /     ASSESSMENT:     REHAB RECOMMENDATIONS:   Recommendation to date pending progress:  Setting:  Short-term Rehab    Equipment:    To Be Determined     ASSESSMENT:  Ms. Chu  is a 73 year old F who presents s/p above procedure POD 1 after a fall OOB. At baseline, pt uses a w/c for mobility. She is typically able to transfer to chair with a slide board with overall SBA from daughter. Daughter assists with all ADL's. She has been working with  PT and has been working on trying to progress to being able to  do stand pivot transfers. This date pt performs mobility including bed mobility with maxA of 2, elevated HOB and extended time. She required maxA of 2 for slide board transfer to chair. Multimodal cueing required for safe transfer technique. Recommend further rehab at d/c. Pt would need to be able to transfer with one person to be able to safely return home.  Pt presents as functioning below her baseline, with deficits in mobility including transfers, gait, balance, and activity tolerance. Pt will benefit from skilled therapy services to address stated deficits to promote return to highest level of function, independence, and safety. Will continue to follow.     [facility]™ “6 Clicks” Basic Mobility Inpatient Short Form  AM-PAC Basic Mobility - Inpatient   How much help is needed turning from your back to your side while in a flat bed without using bedrails?: A Lot  How much help is needed moving from lying on your back to sitting on the side of a flat bed without using bedrails?: A Lot  How much help is needed moving to and from a bed to a chair?: A Lot  How much help is needed standing up from a chair using your arms?: Total  How much help is needed walking in hospital room?: Total  How much help is needed climbing 3-5 steps with a railing?: Total  AM-PAC Inpatient Mobility Raw Score : 9  AM-PAC Inpatient T-Scale Score : 30.55  Mobility Inpatient CMS 0-100% Score: 81.38  Mobility Inpatient CMS G-Code Modifier : CM    SUBJECTIVE:   Ms. Chu states, \"I'm not in much pain\"     Social/Functional Lives With: Daughter  Type of Home: House  Home Layout: Two level, Able to Live on Main level with bedroom/bathroom  Home Access: Ramped entrance  Home Equipment: Hospital bed, Wheelchair-manual (bed pan)  Receives Help From: Family  ADL Assistance: Needs assistance  Ambulation Assistance: Non-ambulatory    OBJECTIVE:     PAIN: VITALS / O2: PRECAUTION / LINES / DRAINS:   Pre Treatment:   Pain Assessment: None -

## 2024-01-25 NOTE — PLAN OF CARE

## 2024-01-25 NOTE — PROGRESS NOTES
Hospitalist Progress Note   Admit Date:  2024  7:30 PM   Name:  Ama Chu   Age:  73 y.o.  Sex:  female  :  1950   MRN:  [id number]   Room:  [facility]/    Presenting/Chief Complaint: No chief complaint on file.     Reason(s) for Admission: Closed disp comminuted fracture of shaft of left femur with malunion [S72.352P]     Hospital Course:   Ama Chu is a 73 y.o. female with medical history of morbid obesity, rheumatoid arthritis, neuropathy, hypertension, depression who presented with left lower leg pain that has persisted for the past week after sliding off her couch.  Patient was evaluated by her orthopedic surgeon who noted that she had a left displaced femur fracture and recommended patient go to the ER to be admitted for surgery.  Patient reports that she suffered the injury approximately 1 week ago.  She was sitting on the couch and was attempting to reach for something when she slid off of it and hurt her left leg.  Since then, she has had pain and was unable to bear weight.  She denied any worsening numbness, weakness in the lower extremity.  She denies any current fevers, chills, shortness of breath, chest pain, nausea, vomiting.     In the ER, patient had x-ray of the left femur which showed a displaced left femur fracture.  As per ER physician, case was discussed with orthopedic surgeon who recommended transfer to [facility] for surgery tomorrow with Dr. Braga's team.     Patient currently lives with her daughter.  She is independent in her ADLs and IADLs.  Recently, she has been using a wheelchair to get around she has had difficulty walking.  She is a former smoker, nondrinker, nondrug use.       Subjective & 24hr Events:   Patient was seen and examined at the bedside.  Daughter was at the bedside.  She was complaining of left hip pain but controlled with pain medication.  She was constipated.  S/p 2.  Patient denies fever, chills, nausea, vomiting,  10 mg Oral Daily    ferrous sulfate (IRON 325) tablet 325 mg  325 mg Oral Daily with breakfast    hydroxychloroquine (PLAQUENIL) tablet 200 mg  200 mg Oral BID    metOLazone (ZAROXOLYN) tablet 5 mg  5 mg Oral Daily PRN    metoprolol succinate (TOPROL XL) extended release tablet 25 mg  25 mg Oral Daily    tamsulosin (FLOMAX) capsule 0.4 mg  0.4 mg Oral Daily    torsemide (DEMADEX) tablet 50 mg  50 mg Oral Daily    traZODone (DESYREL) tablet 300 mg  300 mg Oral Nightly    pregabalin (LYRICA) capsule 75 mg  75 mg Oral BID    predniSONE (DELTASONE) tablet 5 mg  5 mg Oral Daily    LORazepam (ATIVAN) tablet 2 mg  2 mg Oral Nightly    folic acid (FOLVITE) tablet 1 mg  1 mg Oral Daily    celecoxib (CELEBREX) capsule 200 mg  200 mg Oral Daily    melatonin tablet 3 mg  3 mg Oral QHS    HYDROcodone-acetaminophen (NORCO) 5-325 MG per tablet 2 tablet  2 tablet Oral Q4H PRN    morphine injection 4 mg  4 mg IntraVENous Q2H PRN    sodium chloride flush 0.9 % injection 5-40 mL  5-40 mL IntraVENous 2 times per day    sodium chloride flush 0.9 % injection 5-40 mL  5-40 mL IntraVENous PRN    0.9 % sodium chloride infusion   IntraVENous PRN    ondansetron (ZOFRAN-ODT) disintegrating tablet 4 mg  4 mg Oral Q8H PRN    Or    ondansetron (ZOFRAN) injection 4 mg  4 mg IntraVENous Q6H PRN    aspirin EC tablet 325 mg  325 mg Oral BID    sodium chloride flush 0.9 % injection 5-40 mL  5-40 mL IntraVENous 2 times per day    sodium chloride flush 0.9 % injection 5-40 mL  5-40 mL IntraVENous PRN    0.9 % sodium chloride infusion   IntraVENous PRN    potassium chloride (KLOR-CON M) extended release tablet 40 mEq  40 mEq Oral PRN    Or    potassium bicarb-citric acid (EFFER-K) effervescent tablet 40 mEq  40 mEq Oral PRN    Or    potassium chloride 10 mEq/100 mL IVPB (Peripheral Line)  10 mEq IntraVENous PRN    magnesium sulfate 2000 mg in 50 mL IVPB premix  2,000 mg IntraVENous PRN    ondansetron (ZOFRAN-ODT) disintegrating tablet 4 mg  4 mg Oral Q8H

## 2024-01-25 NOTE — PROGRESS NOTES
ACUTE PHYSICAL THERAPY GOALS:   (Developed with and agreed upon by patient and/or caregiver.)    (1.) Ama Chu  will move from supine to sit and sit to supine  with MINIMAL ASSIST within 7 treatment day(s).    (2.) Ama Chu will transfer from bed to chair and chair to bed with MINIMAL ASSIST using the least restrictive device within 7 treatment day(s).    (3.) Ama Chu will perform sitting static and dynamic balance activities x 20 minutes with SUPERVISION to improve safety within 7 treatment day(s).  (4.) Ama Chu will perform therapeutic exercises x 20 min for HEP with INDEPENDENCE to improve strength, endurance, and functional mobility within 7 treatment day(s).     PHYSICAL THERAPY: Daily Note PM   (Link to Caseload Tracking: PT Visit Days : 1  Time In/Out PT Charge Capture  Rehab Caseload Tracker  Orders    Ama Chu is a 73 y.o. female   PRIMARY DIAGNOSIS: Closed disp comminuted fracture of shaft of left femur with malunion  Closed disp comminuted fracture of shaft of left femur with malunion [S72.352P]  Procedure(s) (LRB):  LEFT FEMUR IM NAIL RUFUS INSERTION RETROGRADE (Left)  1 Day Post-Op  Inpatient: Payor: MEDICARE / Plan: MEDICARE PART A AND B / Product Type: *No Product type* /     ASSESSMENT:     REHAB RECOMMENDATIONS:   Recommendation to date pending progress:  Setting:  Short-term Rehab    Equipment:    To Be Determined     ASSESSMENT:  Ms. Chu was received sitting in chair, agreeable to therapy, ready to return to bed. She was able to perform slide board transfer back to bed with maxA of 3- two people assisting from behind and one guarding in front for balance. Improvements noted this PM in technique as she was able to transfer towards non surgical side. Positioning in bed to promote pressure relief and upright posture. Steady progress, will continue to follow.      SUBJECTIVE:   Ms. Chu states, \"it's not too bad\"     Social/Functional Lives With: Daughter  Type of Home:

## 2024-01-25 NOTE — PROGRESS NOTES
ACUTE OCCUPATIONAL THERAPY GOALS:   (Developed with and agreed upon by patient and/or caregiver.)  1. Patient will complete UPPER body bathing and dressing with SET UP and adaptive equipment as needed.     2. Patient will complete functional bed mobility for ADLs with MINIMAL ASSIST.   3. Patient will complete grooming ADL in unsupported sitting with SET UP.  4. Patient will tolerate 25 minutes of OT treatment with 1-2 rest breaks to increase activity tolerance for ADLs.   5. Patient will complete functional transfers with MODERATE ASSIST and adaptive equipment as needed.   6. Patient will tolerate 10 minutes BUE exercises to increase strength for safe, functional transfers.     Timeframe: 7 visits     OCCUPATIONAL THERAPY Initial Assessment and Daily Note       OT Visit Days: 1  Acknowledge Orders  Time  OT Charge Capture  Rehab Caseload Tracker      WBAT for transfers only    Ama Chu is a 73 y.o. female   PRIMARY DIAGNOSIS: Closed disp comminuted fracture of shaft of left femur with malunion  Closed disp comminuted fracture of shaft of left femur with malunion [S72.352P]  Procedure(s) (LRB):  LEFT FEMUR IM NAIL RUFUS INSERTION RETROGRADE (Left)  1 Day Post-Op  Reason for Referral: Pain in left hip (M25.552)  Stiffness of Left Hip, Not elsewhere classified (M25.652)  Difficulty in walking, Not elsewhere classified (R26.2)  Other abnormalities of gait and mobility (R26.89)  History of falling (Z91.81)  Inpatient: Payor: MEDICARE / Plan: MEDICARE PART A AND B / Product Type: *No Product type* /     ASSESSMENT:     REHAB RECOMMENDATIONS:   Recommendation to date pending progress:  Setting:  Short-term Rehab    Equipment:    None--pt has w/c and hospital bed at home     ASSESSMENT:  Ms. Chu is a 74 y/o female presents after falling out of her bed at home with L femur fracture. Pt is now s/p IM nail and is WBAT for transfers only. At baseline pt lives with her daughter, gets assistance from her for all ADLs and

## 2024-01-25 NOTE — CARE COORDINATION
CM met with patient and daughter at bedside to review therapy recommendations for STR at SNF.  List of Medicare facilities provided and reviewed and patient and daughter would like to review and will call this CM this afternoon with top 3 choices.  PPD is in process and covid testing can be ordered closer to time of discharge.  Patient will have met their 3 inpatient Medicare midnights this evening.        01/25/24 1500   Service Assessment   Patient Orientation Alert and Oriented   Cognition Alert   History Provided By Patient;Child/Family;Medical Record   Primary Caregiver Self   Accompanied By/Relationship Daughter   Support Systems Family Members;Children   Patient's Healthcare Decision Maker is: Legal Next of Kin   PCP Verified by CM Yes   Last Visit to PCP Within last 6 months   Prior Functional Level Assistance with the following:;Cooking;Housework;Shopping   Current Functional Level Assistance with the following:;Bathing;Dressing;Toileting;Housework;Shopping;Mobility   Can patient return to prior living arrangement Yes   Ability to make needs known: Good   Family able to assist with home care needs: Yes   Would you like for me to discuss the discharge plan with any other family members/significant others, and if so, who? Yes  (Daughter)   Financial Resources Medicare   Community Resources None   Condition of Participation: Discharge Planning   The Patient and/or Patient Representative was provided with a Choice of Provider? Patient   The Patient and/Or Patient Representative agree with the Discharge Plan? Yes   Freedom of Choice list was provided with basic dialogue that supports the patient's individualized plan of care/goals, treatment preferences, and shares the quality data associated with the providers?  Yes

## 2024-01-26 ENCOUNTER — ANESTHESIA EVENT (OUTPATIENT)
Dept: ENDOSCOPY | Age: 74
End: 2024-01-26
Payer: MEDICARE

## 2024-01-26 PROBLEM — D50.9 IRON DEFICIENCY ANEMIA: Status: ACTIVE | Noted: 2024-01-23

## 2024-01-26 LAB
ANION GAP SERPL CALC-SCNC: 3 MMOL/L (ref 2–11)
BASOPHILS # BLD: 0 K/UL (ref 0–0.2)
BASOPHILS NFR BLD: 0 % (ref 0–2)
BUN SERPL-MCNC: 18 MG/DL (ref 8–23)
CALCIUM SERPL-MCNC: 8.7 MG/DL (ref 8.3–10.4)
CHLORIDE SERPL-SCNC: 103 MMOL/L (ref 103–113)
CO2 SERPL-SCNC: 33 MMOL/L (ref 21–32)
CREAT SERPL-MCNC: 0.8 MG/DL (ref 0.6–1)
DIFFERENTIAL METHOD BLD: ABNORMAL
EOSINOPHIL # BLD: 0.1 K/UL (ref 0–0.8)
EOSINOPHIL NFR BLD: 1 % (ref 0.5–7.8)
ERYTHROCYTE [DISTWIDTH] IN BLOOD BY AUTOMATED COUNT: 16.9 % (ref 11.9–14.6)
GLUCOSE SERPL-MCNC: 117 MG/DL (ref 65–100)
HCT VFR BLD AUTO: 24.8 % (ref 35.8–46.3)
HGB BLD-MCNC: 7.7 G/DL (ref 11.7–15.4)
IMM GRANULOCYTES # BLD AUTO: 0.1 K/UL (ref 0–0.5)
IMM GRANULOCYTES NFR BLD AUTO: 1 % (ref 0–5)
LYMPHOCYTES # BLD: 0.9 K/UL (ref 0.5–4.6)
LYMPHOCYTES NFR BLD: 9 % (ref 13–44)
MCH RBC QN AUTO: 29.7 PG (ref 26.1–32.9)
MCHC RBC AUTO-ENTMCNC: 31 G/DL (ref 31.4–35)
MCV RBC AUTO: 95.8 FL (ref 82–102)
MM INDURATION, POC: 0 MM (ref 0–5)
MONOCYTES # BLD: 1 K/UL (ref 0.1–1.3)
MONOCYTES NFR BLD: 11 % (ref 4–12)
NEUTS SEG # BLD: 7.5 K/UL (ref 1.7–8.2)
NEUTS SEG NFR BLD: 78 % (ref 43–78)
NRBC # BLD: 0 K/UL (ref 0–0.2)
PHOSPHATE SERPL-MCNC: 3.1 MG/DL (ref 2.3–3.7)
PLATELET # BLD AUTO: 255 K/UL (ref 150–450)
PMV BLD AUTO: 10.6 FL (ref 9.4–12.3)
POTASSIUM SERPL-SCNC: 3.9 MMOL/L (ref 3.5–5.1)
PPD, POC: NEGATIVE
RBC # BLD AUTO: 2.59 M/UL (ref 4.05–5.2)
SODIUM SERPL-SCNC: 139 MMOL/L (ref 136–146)
WBC # BLD AUTO: 9.6 K/UL (ref 4.3–11.1)

## 2024-01-26 PROCEDURE — 1100000000 HC RM PRIVATE

## 2024-01-26 PROCEDURE — 80048 BASIC METABOLIC PNL TOTAL CA: CPT

## 2024-01-26 PROCEDURE — 2580000003 HC RX 258: Performed by: STUDENT IN AN ORGANIZED HEALTH CARE EDUCATION/TRAINING PROGRAM

## 2024-01-26 PROCEDURE — 97530 THERAPEUTIC ACTIVITIES: CPT

## 2024-01-26 PROCEDURE — 6370000000 HC RX 637 (ALT 250 FOR IP): Performed by: ORTHOPAEDIC SURGERY

## 2024-01-26 PROCEDURE — 6370000000 HC RX 637 (ALT 250 FOR IP): Performed by: STUDENT IN AN ORGANIZED HEALTH CARE EDUCATION/TRAINING PROGRAM

## 2024-01-26 PROCEDURE — 76937 US GUIDE VASCULAR ACCESS: CPT

## 2024-01-26 PROCEDURE — 6370000000 HC RX 637 (ALT 250 FOR IP): Performed by: INTERNAL MEDICINE

## 2024-01-26 PROCEDURE — 84100 ASSAY OF PHOSPHORUS: CPT

## 2024-01-26 PROCEDURE — 85025 COMPLETE CBC W/AUTO DIFF WBC: CPT

## 2024-01-26 PROCEDURE — 2580000003 HC RX 258: Performed by: ORTHOPAEDIC SURGERY

## 2024-01-26 PROCEDURE — 97112 NEUROMUSCULAR REEDUCATION: CPT

## 2024-01-26 PROCEDURE — 36415 COLL VENOUS BLD VENIPUNCTURE: CPT

## 2024-01-26 RX ORDER — SODIUM CHLORIDE 9 MG/ML
INJECTION, SOLUTION INTRAVENOUS CONTINUOUS
Status: ACTIVE | OUTPATIENT
Start: 2024-01-26 | End: 2024-01-27

## 2024-01-26 RX ORDER — PANTOPRAZOLE SODIUM 40 MG/1
40 TABLET, DELAYED RELEASE ORAL
Status: DISCONTINUED | OUTPATIENT
Start: 2024-01-26 | End: 2024-01-30 | Stop reason: HOSPADM

## 2024-01-26 RX ADMIN — ACETAMINOPHEN 650 MG: 325 TABLET ORAL at 21:22

## 2024-01-26 RX ADMIN — FERROUS SULFATE TAB 325 MG (65 MG ELEMENTAL FE) 325 MG: 325 (65 FE) TAB at 08:45

## 2024-01-26 RX ADMIN — SODIUM CHLORIDE: 9 INJECTION, SOLUTION INTRAVENOUS at 13:24

## 2024-01-26 RX ADMIN — LORAZEPAM 2 MG: 1 TABLET ORAL at 21:23

## 2024-01-26 RX ADMIN — HYDROXYCHLOROQUINE SULFATE 200 MG: 200 TABLET ORAL at 21:23

## 2024-01-26 RX ADMIN — TRAMADOL HYDROCHLORIDE 50 MG: 50 TABLET ORAL at 11:03

## 2024-01-26 RX ADMIN — MAGNESIUM HYDROXIDE 30 ML: 400 SUSPENSION ORAL at 16:28

## 2024-01-26 RX ADMIN — PREDNISONE 5 MG: 5 TABLET ORAL at 08:45

## 2024-01-26 RX ADMIN — ASPIRIN 325 MG: 325 TABLET, COATED ORAL at 21:23

## 2024-01-26 RX ADMIN — FOLIC ACID 1 MG: 1 TABLET ORAL at 08:45

## 2024-01-26 RX ADMIN — ASPIRIN 325 MG: 325 TABLET, COATED ORAL at 08:45

## 2024-01-26 RX ADMIN — POLYETHYLENE GLYCOL 3350 17 G: 17 POWDER, FOR SOLUTION ORAL at 08:50

## 2024-01-26 RX ADMIN — HYDROXYCHLOROQUINE SULFATE 200 MG: 200 TABLET ORAL at 08:45

## 2024-01-26 RX ADMIN — CELECOXIB 200 MG: 100 CAPSULE ORAL at 08:56

## 2024-01-26 RX ADMIN — DOXEPIN HYDROCHLORIDE 150 MG: 50 CAPSULE ORAL at 21:23

## 2024-01-26 RX ADMIN — ESCITALOPRAM OXALATE 10 MG: 10 TABLET ORAL at 08:45

## 2024-01-26 RX ADMIN — SODIUM CHLORIDE, PRESERVATIVE FREE 5 ML: 5 INJECTION INTRAVENOUS at 21:26

## 2024-01-26 RX ADMIN — PREGABALIN 75 MG: 75 CAPSULE ORAL at 21:24

## 2024-01-26 RX ADMIN — HYDROCODONE BITARTRATE AND ACETAMINOPHEN 2 TABLET: 5; 325 TABLET ORAL at 05:58

## 2024-01-26 RX ADMIN — SODIUM CHLORIDE, PRESERVATIVE FREE 5 ML: 5 INJECTION INTRAVENOUS at 21:27

## 2024-01-26 RX ADMIN — TRAZODONE HYDROCHLORIDE 300 MG: 50 TABLET ORAL at 22:17

## 2024-01-26 RX ADMIN — TAMSULOSIN HYDROCHLORIDE 0.4 MG: 0.4 CAPSULE ORAL at 08:45

## 2024-01-26 RX ADMIN — PREGABALIN 75 MG: 75 CAPSULE ORAL at 08:45

## 2024-01-26 RX ADMIN — Medication 3 MG: at 21:22

## 2024-01-26 ASSESSMENT — PAIN SCALES - GENERAL
PAINLEVEL_OUTOF10: 7
PAINLEVEL_OUTOF10: 6
PAINLEVEL_OUTOF10: 0
PAINLEVEL_OUTOF10: 0
PAINLEVEL_OUTOF10: 6
PAINLEVEL_OUTOF10: 3

## 2024-01-26 ASSESSMENT — PAIN DESCRIPTION - LOCATION
LOCATION: LEG

## 2024-01-26 ASSESSMENT — PAIN DESCRIPTION - ORIENTATION
ORIENTATION: ANTERIOR;UPPER
ORIENTATION: LEFT
ORIENTATION: LEFT

## 2024-01-26 ASSESSMENT — PAIN DESCRIPTION - DESCRIPTORS
DESCRIPTORS: TENDER;SQUEEZING;SHOOTING
DESCRIPTORS: TENDER;SQUEEZING;SHOOTING
DESCRIPTORS: ACHING
DESCRIPTORS: ACHING

## 2024-01-26 NOTE — ANESTHESIA POSTPROCEDURE EVALUATION
Department of Anesthesiology  Postprocedure Note    Patient: Ama Chu  MRN: [id number]  YOB: 1950  Date of evaluation: 1/26/2024    Procedure Summary       Date: 01/24/24 Room / Location: [facility] MAIN OR 08 / [facility] MAIN OR    Anesthesia Start: 1458 Anesthesia Stop: 1616    Procedure: LEFT FEMUR IM NAIL RUFUS INSERTION RETROGRADE (Left: Leg Upper) Diagnosis:       Closed fracture of left femur, unspecified fracture morphology, unspecified portion of femur, initial encounter ([facility])      (Closed fracture of left femur, unspecified fracture morphology, unspecified portion of femur, initial encounter ([facility]) [S72.92XA])    Providers: Carter Braga MD Responsible Provider: David Frederick MD    Anesthesia Type: general ASA Status: 4            Anesthesia Type: No value filed.    Kandi Phase I: Kandi Score: 10    Kandi Phase II:      Anesthesia Post Evaluation    Patient location during evaluation: PACU  Patient participation: complete - patient participated  Level of consciousness: awake  Airway patency: patent  Nausea & Vomiting: no nausea and no vomiting  Cardiovascular status: blood pressure returned to baseline  Respiratory status: acceptable  Hydration status: euvolemic  Pain management: adequate    No notable events documented.

## 2024-01-26 NOTE — PROGRESS NOTES
Hourly rounding completed during shift. All needs met at this time. Bed L/L with call bell in reach.  Report given to oncoming RN.

## 2024-01-26 NOTE — PROGRESS NOTES
Hospitalist Progress Note   Admit Date:  2024  7:30 PM   Name:  Ama Chu   Age:  73 y.o.  Sex:  female  :  1950   MRN:  [id number]   Room:  [facility]/    Presenting/Chief Complaint: No chief complaint on file.     Reason(s) for Admission: Closed disp comminuted fracture of shaft of left femur with malunion [S72.352P]     Hospital Course:   Ama Chu is a 73 y.o. female with medical history of morbid obesity, rheumatoid arthritis, neuropathy, hypertension, depression who presented with left lower leg pain that has persisted for the past week after sliding off her couch.  Patient was evaluated by her orthopedic surgeon who noted that she had a left displaced femur fracture and recommended patient go to the ER to be admitted for surgery.  Patient reports that she suffered the injury approximately 1 week ago.  She was sitting on the couch and was attempting to reach for something when she slid off of it and hurt her left leg.  Since then, she has had pain and was unable to bear weight.  She denied any worsening numbness, weakness in the lower extremity.  She denies any current fevers, chills, shortness of breath, chest pain, nausea, vomiting.     In the ER, patient had x-ray of the left femur which showed a displaced left femur fracture.  As per ER physician, case was discussed with orthopedic surgeon who recommended transfer to [facility] for surgery tomorrow with Dr. Braga's team.     Patient currently lives with her daughter.  She is independent in her ADLs and IADLs.  Recently, she has been using a wheelchair to get around she has had difficulty walking.  She is a former smoker, nondrinker, nondrug use.       Subjective & 24hr Events:   Patient was seen and examined at the bedside.  Daughter was at the bedside.   She was constipated.  She was complaining of dizziness and looks pale .  Patient denies fever, chills, nausea, vomiting, diarrhea.      Assessment & Plan:    Left femur fracture  S/p left femur IM nail rashad insertion POD 2  Leukocytosis resolved  -Pain control as needed with morphine and tramadol  Ortho on board, appreciate recommendations  -PT/OT recommended STR    Symptomatic anemia  Patient uses celecoxib and steroids.  May be gastritis  Hemoglobin dropped to 7.7   transfuse if hemoglobin is less than 7.  Follow-up with [facility]  Started PPI twice daily  GI consulted, appreciate recommendations     Rheumatoid arthritis  Holding celecoxib  -Continue prednisone 5 mg daily  -Continue Plaquenil 200 mg twice daily  -Hold methotrexate and Enbrel injections while inpatient     Neuropathy  Continue with Lyrica twice daily     Hypertension  Antihypertensives amlodipine, torsemide and metoprolol    Constipation  Pain medications may be contributing  Ordered enema.     CKD 3  Renal function appears to be around baseline.  Will continue to monitor while inpatient.     Depression  Mood appears to be at baseline as per daughters  -Continue with doxepin nightly  -Continue with trazodone nightly  -Continue with Lexapro daily    Urinary incontinence  Patient uses pure wick at home.  She is on torsemide to help her with the urinary output.  -Continue torsemide and Flomax  \  Dispo anticipate hospital stay for 1 to 2 days. Pending STR      PT/OT evals and PPD ordered?   str  Diet: Diet NPO Exceptions are: Sips of Water with Meds, Ice Chips  ADULT DIET; Regular  VTE prophylaxis: Lovenox  Code status: Full Code      Non-peripheral Lines and Tubes (if present):      External Urinary Catheter (Active)        Telemetry (if present):           Hospital Problems:  Principal Problem:    Closed disp comminuted fracture of shaft of left femur with malunion  Resolved Problems:    * No resolved hospital problems. *      Objective:   Patient Vitals for the past 24 hrs:   Temp Pulse Resp BP SpO2   01/26/24 1103 -- -- 18 -- --   01/26/24 0830 -- 72 -- (!) 96/52 --   01/26/24 0707 97.8 °F (36.6 °C) 75 16

## 2024-01-26 NOTE — PROGRESS NOTES
[facility]        2024         Post Op day: 2 Days Post-Op Procedure(s) (LRB):  LEFT FEMUR IM NAIL RUFUS INSERTION RETROGRADE (Left)      Admit Date: 2024  Admit Diagnosis: Closed disp comminuted fracture of shaft of left femur with malunion [S72.352P]       Principle Problem: Closed disp comminuted fracture of shaft of left femur with malunion.           Subjective: Doing well, dressing change not done yesterday,  No complaints, No SOB, No Chest Pain, No Nausea or Vomiting     Objective:   Vital Signs are Stable, No Acute Distress, Alert,  Dressing is Dry,  Neurovascular exam is normal.     Assessment / Plan :  Patient Active Problem List   Diagnosis    Closed disp comminuted fracture of shaft of left femur with malunion    Rheumatoid arthritis (formerly [facility])    Hypertension    Depression    Neuropathy    CKD (chronic kidney disease) stage 3, GFR 30-59 ml/min (formerly [facility])    Patient Vitals for the past 8 hrs:   BP Temp Temp src Pulse Resp SpO2   24 0830 (!) 96/52 -- -- 72 -- --   24 0707 (!) 96/46 97.8 °F (36.6 °C) Oral 75 16 95 %   24 0628 -- -- -- -- 16 --   24 0558 -- -- -- -- 16 --    Temp (24hrs), Av.5 °F (36.4 °C), Min:97.2 °F (36.2 °C), Max:97.8 °F (36.6 °C)    Body mass index is 52.42 kg/m².    Lab Results   Component Value Date/Time    HGB 7.7 2024 05:34 AM          S/P Procedure(s) (LRB):  LEFT FEMUR IM NAIL RUFUS INSERTION RETROGRADE (Left)      Vit D 2000iu daily x 12 weeks   Medical Mgmt per hospitalist  Anticoagulation plan: ASA 325mg daily  Continue PT, wBAT for transfers only - do not force weight  Dressing change: dressing change today as discussed with RN  Fall Precautions  DC disp: rehab placement  F/U: 2 weeks postop for wound check and staple removals       Signed By: RAFFY Taylor  2024,  9:27 AM

## 2024-01-26 NOTE — PROGRESS NOTES
ACUTE PHYSICAL THERAPY GOALS:   (Developed with and agreed upon by patient and/or caregiver.)    (1.) Ama Chu  will move from supine to sit and sit to supine  with MINIMAL ASSIST within 7 treatment day(s).    (2.) Ama Chu will transfer from bed to chair and chair to bed with MINIMAL ASSIST using the least restrictive device within 7 treatment day(s).    (3.) Ama Chu will perform sitting static and dynamic balance activities x 20 minutes with SUPERVISION to improve safety within 7 treatment day(s).  (4.) Ama Chu will perform therapeutic exercises x 20 min for HEP with INDEPENDENCE to improve strength, endurance, and functional mobility within 7 treatment day(s).     PHYSICAL THERAPY: Daily Note AM   (Link to Caseload Tracking: PT Visit Days : 2  Time In/Out PT Charge Capture  Rehab Caseload Tracker  Orders    Ama Chu is a 73 y.o. female   PRIMARY DIAGNOSIS: Closed disp comminuted fracture of shaft of left femur with malunion  Closed disp comminuted fracture of shaft of left femur with malunion [S72.352P]  Procedure(s) (LRB):  LEFT FEMUR IM NAIL RUFUS INSERTION RETROGRADE (Left)  2 Days Post-Op  Inpatient: Payor: MEDICARE / Plan: MEDICARE PART A AND B / Product Type: *No Product type* /     ASSESSMENT:     REHAB RECOMMENDATIONS:   Recommendation to date pending progress:  Setting:  Short-term Rehab    Equipment:    To Be Determined     ASSESSMENT:  Ms. Chu was supine in bed on arrival and agreeable to get to chair. Supine to sit on edge of bed with max A x2. She attempted to scoot forward and to left but pt unable to move. Attempted sit to stand multiple times but pt barely able to clear the bed. Required max A x3 for sliding board transfer. Pt left in chair with needs in reach.       SUBJECTIVE:   Ms. Chu states, \"My knee hurts\"     Social/Functional Lives With: Daughter  Type of Home: House  Home Layout: Two level, Able to Live on Main level with bedroom/bathroom  Home Access:

## 2024-01-26 NOTE — PROGRESS NOTES
ACUTE PHYSICAL THERAPY GOALS:   (Developed with and agreed upon by patient and/or caregiver.)    (1.) Ama Chu  will move from supine to sit and sit to supine  with MINIMAL ASSIST within 7 treatment day(s).    (2.) Ama Chu will transfer from bed to chair and chair to bed with MINIMAL ASSIST using the least restrictive device within 7 treatment day(s).    (3.) Ama Chu will perform sitting static and dynamic balance activities x 20 minutes with SUPERVISION to improve safety within 7 treatment day(s).  (4.) Ama Chu will perform therapeutic exercises x 20 min for HEP with INDEPENDENCE to improve strength, endurance, and functional mobility within 7 treatment day(s).     PHYSICAL THERAPY: Daily Note PM   (Link to Caseload Tracking: PT Visit Days : 2  Time In/Out PT Charge Capture  Rehab Caseload Tracker  Orders    Ama Chu is a 73 y.o. female   PRIMARY DIAGNOSIS: Closed disp comminuted fracture of shaft of left femur with malunion  Closed disp comminuted fracture of shaft of left femur with malunion [S72.352P]  Procedure(s) (LRB):  LEFT FEMUR IM NAIL RUFUS INSERTION RETROGRADE (Left)  2 Days Post-Op  Inpatient: Payor: MEDICARE / Plan: MEDICARE PART A AND B / Product Type: *No Product type* /     ASSESSMENT:     REHAB RECOMMENDATIONS:   Recommendation to date pending progress:  Setting:  Short-term Rehab    Equipment:    To Be Determined     ASSESSMENT:  Ms. Chu was supine in bed on arrival and agreeable to get to chair. Supine to sit on edge of bed with max A x2. She attempted to scoot forward and to left but pt unable to move. Attempted sit to stand multiple times but pt barely able to clear the bed. Required max A x3 for sliding board transfer. Pt left in chair with needs in reach.      PM:  Pt sitting in chair and ready to get back in bed. She did better with sliding board transfer over to bed and required mod A x3. Max A x3 for sit to supine. Rolling left and right before linen

## 2024-01-26 NOTE — DISCHARGE INSTRUCTIONS
[facility]      IF YOU HAVE ANY PROBLEMS ONCE YOU ARE AT HOME CALL THE FOLLOWING NUMBERS:   Main office number: [phone] ask for Coretta (medical assistant with Dr. Braga)  Office Address: [address] [place] [address]  [address] [place] [address]      Patient Discharge Instructions    Ama Chu / [id number] : 1950    Admitted 2024           To be given to Skilled Nursing Home on Admission         Weight bearing status: As tolerated with assistance for transfers only     Activity  Continue Physical Therapy and Occupational Therapy - Do not force weight (True as tolerated)  Fall precautions     Wound Care   Staples are to be left in place and removed in our office  and Dry dressing changes using sterile technique every other day or more frequently if needed     Diet  Resume regular or diabetic diet      Medications    Patient medications are listed on the medication reconciliation sheet.     Follow up   Follow up in our office in 2 weeks postop   All patients are to be transported via stretcher unless they are able to independently get out of a chair and stand without assistance.               Information obtained by :  I understand that if any problems occur once I am at home I am to contact my physician.    I understand and acknowledge receipt of the instructions indicated above.                                                                                                                                           Physician's or R.N.'s Signature                                                                  Date/Time                                                                                                                                              Patient or Representative Signature                                                          Date/Time

## 2024-01-26 NOTE — PROGRESS NOTES
US Guided PIV access-    Ultrasound was used to find the vein which was compressible and without any ultrasound features of an artery or nerve bundle. Skin was cleaned and disinfected prior to IV puncture.  Under real-time ultrasound guidance peripheral access was obtained in the left forearm using 22 G 1.75\" Peripheral IV catheter after 1 attempt(s). Blood return was present and IV flushed without difficulty with no clinical signs of infiltration. IV dressing applied and no immediate complications noted. Patient tolerated the procedure well.

## 2024-01-26 NOTE — PROGRESS NOTES
ACUTE OCCUPATIONAL THERAPY GOALS:   (Developed with and agreed upon by patient and/or caregiver.)    1. Patient will complete UPPER body bathing and dressing with SET UP and adaptive equipment as needed.     2. Patient will complete functional bed mobility for ADLs with MINIMAL ASSIST.   3. Patient will complete grooming ADL in unsupported sitting with SET UP.  4. Patient will tolerate 25 minutes of OT treatment with 1-2 rest breaks to increase activity tolerance for ADLs.   5. Patient will complete functional transfers with MODERATE ASSIST and adaptive equipment as needed.   6. Patient will tolerate 10 minutes BUE exercises to increase strength for safe, functional transfers.   OCCUPATIONAL THERAPY: Daily Note AM/PM   OT Visit Days: 2   Time In/Out  OT Charge Capture  Rehab Caseload Tracker  OT Orders    Ama Chu is a 73 y.o. female   PRIMARY DIAGNOSIS: Closed disp comminuted fracture of shaft of left femur with malunion  Closed disp comminuted fracture of shaft of left femur with malunion [S72.352P]  Procedure(s) (LRB):  LEFT FEMUR IM NAIL RUFUS INSERTION RETROGRADE (Left)  2 Days Post-Op  Inpatient: Payor: MEDICARE / Plan: MEDICARE PART A AND B / Product Type: *No Product type* /     ASSESSMENT:     REHAB RECOMMENDATIONS:   Recommendation to date pending progress:  Setting:  Short-term Rehab    Equipment:    To Be Determined     ASSESSMENT:  Ms. Chu presents with generalized weakness, decreased activity tolerance, and decreased independence with mobility. Pt required max/total assist 2-3 with scooting and bed to chair transfer with a sliding board. Good effort. Continue OT POC.  PM: Pt was assisted back to bed and was able to participate a little more with the lateral scooting during the transfer; still needs at least max a 2-3.       SUBJECTIVE:     Ms. Chu states, \"Can you move that leg for me?\"     Social/Functional Lives With: Daughter  Type of Home: House  Home Layout: Two level, Able to Live on  Main level with bedroom/bathroom  Home Access: Ramped entrance  Home Equipment: Hospital bed, Wheelchair-manual (bed pan)  Receives Help From: Family  ADL Assistance: Needs assistance  Ambulation Assistance: Non-ambulatory    OBJECTIVE:     LINES / DRAINS / AIRWAY: None    RESTRICTIONS/PRECAUTIONS:  Restrictions/Precautions  Restrictions/Precautions: Weight Bearing  Lower Extremity Weight Bearing Restrictions  Left Lower Extremity Weight Bearing: Weight Bearing As Tolerated (for transfers only)        PAIN: VITALS / O2:   Pre Treatment:    none        Post Treatment: none Vitals          Oxygen        MOBILITY: I Mod I S SBA CGA Min Mod Max Total  NT x2 Comments:   Bed Mobility    Rolling [] [] [] [] [] [] [] [x] [] [] [x]    Supine to Sit [] [] [] [] [] [] [] [x] [] [] [x]    Scooting [] [] [] [] [] [] [] [x] [x] [] [x] Mostly total assist with lateral scooting   Sit to Supine [] [] [] [] [] [] [] [] [] [] []    Transfers    Sit to Stand [] [] [] [] [] [] [] [] [] [] []    Bed to Chair [] [] [] [] [] [] [] [] [x] [] [] X 3   Stand to Sit [] [] [] [] [] [] [] [] [x] [] []    Tub/Shower [] [] [] [] [] [] [] [] [] [] []     Toilet [] [] [] [] [] [] [] [] [] [] []      [] [] [] [] [] [] [] [] [] [] []    I=Independent, Mod I=Modified Independent, S=Supervision/Setup, SBA=Standby Assistance, CGA=Contact Guard Assistance, Min=Minimal Assistance, Mod=Moderate Assistance, Max=Maximal Assistance, Total=Total Assistance, NT=Not Tested    ACTIVITIES OF DAILY LIVING: I Mod I S SBA CGA Min Mod Max Total NT Comments   BASIC ADLs:              Upper Body   Bathing [] [] [] [] [] [] [] [] [] [x]     Lower Body Bathing [] [] [] [] [] [] [] [] [] [x]     Toileting [] [] [] [] [] [] [] [] [] [x]    Upper Body Dressing [] [] [] [] [] [] [] [] [] [x]    Lower Body Dressing [] [] [] [] [] [] [] [] [] [x]    Feeding [] [] [] [] [] [] [] [] [] [x]    Grooming [] [] [] [] [] [] [] [] [] [x]    Personal Device Care [] [] [] [] [] [] [] []

## 2024-01-26 NOTE — CARE COORDINATION
Per IDR f/u GI has been consulted r/t HgB trending downward - awaiting recs.    Still awaiting responses from STR referrals.  Referrals were submitted to:  [facility] - Accepted  [facility] - Accepted (Bed available on 1/29)  [facility]    No pre-cert required.    CM will continue to follow.  LOS = 3 days

## 2024-01-27 ENCOUNTER — ANESTHESIA (OUTPATIENT)
Dept: ENDOSCOPY | Age: 74
End: 2024-01-27
Payer: MEDICARE

## 2024-01-27 LAB
ANION GAP SERPL CALC-SCNC: 1 MMOL/L (ref 2–11)
BASOPHILS # BLD: 0 K/UL (ref 0–0.2)
BASOPHILS NFR BLD: 0 % (ref 0–2)
BUN SERPL-MCNC: 16 MG/DL (ref 8–23)
CALCIUM SERPL-MCNC: 8.8 MG/DL (ref 8.3–10.4)
CHLORIDE SERPL-SCNC: 103 MMOL/L (ref 103–113)
CO2 SERPL-SCNC: 35 MMOL/L (ref 21–32)
CREAT SERPL-MCNC: 0.6 MG/DL (ref 0.6–1)
DIFFERENTIAL METHOD BLD: ABNORMAL
EOSINOPHIL # BLD: 0.3 K/UL (ref 0–0.8)
EOSINOPHIL NFR BLD: 3 % (ref 0.5–7.8)
ERYTHROCYTE [DISTWIDTH] IN BLOOD BY AUTOMATED COUNT: 17 % (ref 11.9–14.6)
GLUCOSE SERPL-MCNC: 108 MG/DL (ref 65–100)
HCT VFR BLD AUTO: 26.3 % (ref 35.8–46.3)
HGB BLD-MCNC: 8 G/DL (ref 11.7–15.4)
IMM GRANULOCYTES # BLD AUTO: 0.2 K/UL (ref 0–0.5)
IMM GRANULOCYTES NFR BLD AUTO: 2 % (ref 0–5)
LYMPHOCYTES # BLD: 1.2 K/UL (ref 0.5–4.6)
LYMPHOCYTES NFR BLD: 13 % (ref 13–44)
MCH RBC QN AUTO: 29.3 PG (ref 26.1–32.9)
MCHC RBC AUTO-ENTMCNC: 30.4 G/DL (ref 31.4–35)
MCV RBC AUTO: 96.3 FL (ref 82–102)
MONOCYTES # BLD: 1.1 K/UL (ref 0.1–1.3)
MONOCYTES NFR BLD: 12 % (ref 4–12)
NEUTS SEG # BLD: 6.3 K/UL (ref 1.7–8.2)
NEUTS SEG NFR BLD: 70 % (ref 43–78)
NRBC # BLD: 0.02 K/UL (ref 0–0.2)
PHOSPHATE SERPL-MCNC: 2.6 MG/DL (ref 2.3–3.7)
PLATELET # BLD AUTO: 301 K/UL (ref 150–450)
PMV BLD AUTO: 10.7 FL (ref 9.4–12.3)
POTASSIUM SERPL-SCNC: 4.2 MMOL/L (ref 3.5–5.1)
RBC # BLD AUTO: 2.73 M/UL (ref 4.05–5.2)
SODIUM SERPL-SCNC: 139 MMOL/L (ref 136–146)
WBC # BLD AUTO: 9.2 K/UL (ref 4.3–11.1)

## 2024-01-27 PROCEDURE — 99222 1ST HOSP IP/OBS MODERATE 55: CPT | Performed by: INTERNAL MEDICINE

## 2024-01-27 PROCEDURE — 84100 ASSAY OF PHOSPHORUS: CPT

## 2024-01-27 PROCEDURE — 0DJ08ZZ INSPECTION OF UPPER INTESTINAL TRACT, VIA NATURAL OR ARTIFICIAL OPENING ENDOSCOPIC: ICD-10-PCS | Performed by: INTERNAL MEDICINE

## 2024-01-27 PROCEDURE — 6370000000 HC RX 637 (ALT 250 FOR IP): Performed by: INTERNAL MEDICINE

## 2024-01-27 PROCEDURE — 43235 EGD DIAGNOSTIC BRUSH WASH: CPT | Performed by: INTERNAL MEDICINE

## 2024-01-27 PROCEDURE — 7100000001 HC PACU RECOVERY - ADDTL 15 MIN: Performed by: INTERNAL MEDICINE

## 2024-01-27 PROCEDURE — 2580000003 HC RX 258: Performed by: INTERNAL MEDICINE

## 2024-01-27 PROCEDURE — 7100000000 HC PACU RECOVERY - FIRST 15 MIN: Performed by: INTERNAL MEDICINE

## 2024-01-27 PROCEDURE — 2500000003 HC RX 250 WO HCPCS

## 2024-01-27 PROCEDURE — 6370000000 HC RX 637 (ALT 250 FOR IP): Performed by: STUDENT IN AN ORGANIZED HEALTH CARE EDUCATION/TRAINING PROGRAM

## 2024-01-27 PROCEDURE — 2709999900 HC NON-CHARGEABLE SUPPLY: Performed by: INTERNAL MEDICINE

## 2024-01-27 PROCEDURE — 36415 COLL VENOUS BLD VENIPUNCTURE: CPT

## 2024-01-27 PROCEDURE — 2580000003 HC RX 258: Performed by: ORTHOPAEDIC SURGERY

## 2024-01-27 PROCEDURE — 85025 COMPLETE CBC W/AUTO DIFF WBC: CPT

## 2024-01-27 PROCEDURE — 6370000000 HC RX 637 (ALT 250 FOR IP): Performed by: FAMILY MEDICINE

## 2024-01-27 PROCEDURE — 6370000000 HC RX 637 (ALT 250 FOR IP): Performed by: ORTHOPAEDIC SURGERY

## 2024-01-27 PROCEDURE — 6360000002 HC RX W HCPCS

## 2024-01-27 PROCEDURE — 80048 BASIC METABOLIC PNL TOTAL CA: CPT

## 2024-01-27 PROCEDURE — 3609017100 HC EGD: Performed by: INTERNAL MEDICINE

## 2024-01-27 PROCEDURE — 97110 THERAPEUTIC EXERCISES: CPT

## 2024-01-27 PROCEDURE — 3700000000 HC ANESTHESIA ATTENDED CARE: Performed by: INTERNAL MEDICINE

## 2024-01-27 PROCEDURE — 1100000000 HC RM PRIVATE

## 2024-01-27 RX ORDER — PROPOFOL 10 MG/ML
INJECTION, EMULSION INTRAVENOUS PRN
Status: DISCONTINUED | OUTPATIENT
Start: 2024-01-27 | End: 2024-01-27 | Stop reason: SDUPTHER

## 2024-01-27 RX ORDER — LIDOCAINE HYDROCHLORIDE 20 MG/ML
INJECTION, SOLUTION EPIDURAL; INFILTRATION; INTRACAUDAL; PERINEURAL PRN
Status: DISCONTINUED | OUTPATIENT
Start: 2024-01-27 | End: 2024-01-27 | Stop reason: SDUPTHER

## 2024-01-27 RX ORDER — BISACODYL 10 MG
10 SUPPOSITORY, RECTAL RECTAL DAILY PRN
Status: DISCONTINUED | OUTPATIENT
Start: 2024-01-27 | End: 2024-01-30 | Stop reason: HOSPADM

## 2024-01-27 RX ADMIN — ASPIRIN 325 MG: 325 TABLET, COATED ORAL at 21:13

## 2024-01-27 RX ADMIN — TAMSULOSIN HYDROCHLORIDE 0.4 MG: 0.4 CAPSULE ORAL at 10:28

## 2024-01-27 RX ADMIN — POLYETHYLENE GLYCOL 3350 17 G: 17 POWDER, FOR SOLUTION ORAL at 10:36

## 2024-01-27 RX ADMIN — ESCITALOPRAM OXALATE 10 MG: 10 TABLET ORAL at 10:29

## 2024-01-27 RX ADMIN — DOXEPIN HYDROCHLORIDE 150 MG: 50 CAPSULE ORAL at 21:13

## 2024-01-27 RX ADMIN — ASPIRIN 325 MG: 325 TABLET, COATED ORAL at 10:29

## 2024-01-27 RX ADMIN — FERROUS SULFATE TAB 325 MG (65 MG ELEMENTAL FE) 325 MG: 325 (65 FE) TAB at 10:29

## 2024-01-27 RX ADMIN — SODIUM CHLORIDE, PRESERVATIVE FREE 10 ML: 5 INJECTION INTRAVENOUS at 21:14

## 2024-01-27 RX ADMIN — LIDOCAINE HYDROCHLORIDE 100 MG: 20 INJECTION, SOLUTION EPIDURAL; INFILTRATION; INTRACAUDAL; PERINEURAL at 08:37

## 2024-01-27 RX ADMIN — HYDROXYCHLOROQUINE SULFATE 200 MG: 200 TABLET ORAL at 10:28

## 2024-01-27 RX ADMIN — TRAZODONE HYDROCHLORIDE 300 MG: 50 TABLET ORAL at 21:13

## 2024-01-27 RX ADMIN — SODIUM CHLORIDE, PRESERVATIVE FREE 10 ML: 5 INJECTION INTRAVENOUS at 22:14

## 2024-01-27 RX ADMIN — BISACODYL 10 MG: 10 SUPPOSITORY RECTAL at 21:52

## 2024-01-27 RX ADMIN — SODIUM CHLORIDE, PRESERVATIVE FREE 10 ML: 5 INJECTION INTRAVENOUS at 10:30

## 2024-01-27 RX ADMIN — PANTOPRAZOLE SODIUM 40 MG: 40 TABLET, DELAYED RELEASE ORAL at 15:17

## 2024-01-27 RX ADMIN — PROPOFOL 50 MG: 10 INJECTION, EMULSION INTRAVENOUS at 08:37

## 2024-01-27 RX ADMIN — PREDNISONE 5 MG: 5 TABLET ORAL at 10:29

## 2024-01-27 RX ADMIN — HYDROXYCHLOROQUINE SULFATE 200 MG: 200 TABLET ORAL at 21:14

## 2024-01-27 RX ADMIN — Medication 3 MG: at 21:13

## 2024-01-27 RX ADMIN — HYDROCODONE BITARTRATE AND ACETAMINOPHEN 2 TABLET: 5; 325 TABLET ORAL at 18:32

## 2024-01-27 RX ADMIN — PREGABALIN 75 MG: 75 CAPSULE ORAL at 10:28

## 2024-01-27 RX ADMIN — TRAMADOL HYDROCHLORIDE 50 MG: 50 TABLET ORAL at 05:52

## 2024-01-27 RX ADMIN — HYDROCODONE BITARTRATE AND ACETAMINOPHEN 2 TABLET: 5; 325 TABLET ORAL at 10:36

## 2024-01-27 RX ADMIN — FOLIC ACID 1 MG: 1 TABLET ORAL at 10:29

## 2024-01-27 RX ADMIN — LORAZEPAM 2 MG: 1 TABLET ORAL at 21:13

## 2024-01-27 RX ADMIN — PANTOPRAZOLE SODIUM 40 MG: 40 TABLET, DELAYED RELEASE ORAL at 05:52

## 2024-01-27 RX ADMIN — PROPOFOL 20 MG: 10 INJECTION, EMULSION INTRAVENOUS at 08:39

## 2024-01-27 RX ADMIN — PREGABALIN 75 MG: 75 CAPSULE ORAL at 21:13

## 2024-01-27 ASSESSMENT — PAIN DESCRIPTION - ORIENTATION
ORIENTATION: LEFT
ORIENTATION: RIGHT

## 2024-01-27 ASSESSMENT — PAIN SCALES - GENERAL
PAINLEVEL_OUTOF10: 7
PAINLEVEL_OUTOF10: 6

## 2024-01-27 ASSESSMENT — PAIN DESCRIPTION - LOCATION
LOCATION: LEG
LOCATION: LEG

## 2024-01-27 ASSESSMENT — PAIN DESCRIPTION - DESCRIPTORS: DESCRIPTORS: ACHING

## 2024-01-27 ASSESSMENT — PAIN - FUNCTIONAL ASSESSMENT
PAIN_FUNCTIONAL_ASSESSMENT: NONE - DENIES PAIN
PAIN_FUNCTIONAL_ASSESSMENT: NONE - DENIES PAIN

## 2024-01-27 NOTE — PROGRESS NOTES
If you need to see a   -  just ask the nurse to call us.    We look forward to serving your family!!        Chaplain Alicia

## 2024-01-27 NOTE — PERIOP NOTE
Attempted to call daughterMigdalia, with no answer. Attempted to call surgical waiting room with no answer.

## 2024-01-27 NOTE — PROGRESS NOTES
ACUTE PHYSICAL THERAPY GOALS:   (Developed with and agreed upon by patient and/or caregiver.)    (1.) Ama Chu  will move from supine to sit and sit to supine  with MINIMAL ASSIST within 7 treatment day(s).    (2.) Ama Chu will transfer from bed to chair and chair to bed with MINIMAL ASSIST using the least restrictive device within 7 treatment day(s).    (3.) Ama Chu will perform sitting static and dynamic balance activities x 20 minutes with SUPERVISION to improve safety within 7 treatment day(s).  (4.) Ama Chu will perform therapeutic exercises x 20 min for HEP with INDEPENDENCE to improve strength, endurance, and functional mobility within 7 treatment day(s).     PHYSICAL THERAPY: Daily Note PM   (Link to Caseload Tracking: PT Visit Days : 3  Time In/Out PT Charge Capture  Rehab Caseload Tracker  Orders     [facility] FOR TRANSFERS ONLY    Ama Chu is a 73 y.o. female   PRIMARY DIAGNOSIS: Closed disp comminuted fracture of shaft of left femur with malunion  Closed disp comminuted fracture of shaft of left femur with malunion [S72.352P]  Procedure(s) (LRB):  EGD BIOPSY (N/A)  Day of Surgery  Inpatient: Payor: MEDICARE / Plan: MEDICARE PART A AND B / Product Type: *No Product type* /     ASSESSMENT:     REHAB RECOMMENDATIONS:   Recommendation to date pending progress:  Setting:  Short-term Rehab    Equipment:    To Be Determined     ASSESSMENT:  Ms. Chu was in bed with bed positioned in chair position on contact and agreeable to work with therapy. The PT session today focused on active/AA ex to all extremities.           SUBJECTIVE:   Ms. Chu states, \"I'd rather just go home if I could\".     Social/Functional Lives With: Daughter  Type of Home: House  Home Layout: Two level, Able to Live on Main level with bedroom/bathroom  Home Access: Ramped entrance  Home Equipment: Hospital bed, Wheelchair-manual (bed pan)  Receives Help From: Family  ADL Assistance: Needs assistance  Ambulation  Assistance: Non-ambulatory  OBJECTIVE:     PAIN: VITALS / O2: PRECAUTION / LINES / DRAINS:   Pre Treatment:  not rated         Post Treatment:  not rated Vitals        Oxygen    External Catheter    RESTRICTIONS/PRECAUTIONS:        MOBILITY: I Mod I S SBA CGA Min Mod Max Total  NT x2 Comments:   Bed Mobility    Rolling [] [] [] [] [] [] [] [] [] [x] []    Supine to Sit [] [] [] [] [] [] [] [] [] [x] []    Scooting [] [] [] [] [] [] [] [] [] [x] []    Sit to Supine [] [] [] [] [] [] [] [] [] [x] []    Transfers    Sit to Stand [] [] [] [] [] [] [] [] [] [x] []    Bed to Chair [] [] [] [] [] [] [] [] [] [x] []    Stand to Sit [] [] [] [] [] [] [] [] [] [x] []     [] [] [] [] [] [] [] [] [] [] []    I=Independent, Mod I=Modified Independent, S=Supervision, SBA=Standby Assistance, CGA=Contact Guard Assistance,   Min=Minimal Assistance, Mod=Moderate Assistance, Max=Maximal Assistance, Total=Total Assistance, NT=Not Tested    BALANCE: Good Fair+ Fair Fair- Poor NT Comments   Sitting Static [] [] [] [] [] [x]    Sitting Dynamic [] [] [] [] [] [x]              Standing Static [] [] [] [] [] [x]    Standing Dynamic [] [] [] [] [] [x]      GAIT: I Mod I S SBA CGA Min Mod Max Total  NT x2 Comments:   Level of Assistance [] [] [] [] [] [] [] [] [] [x] []    Distance   N/a    DME N/A    Gait Quality N/A    Weightbearing Status      Stairs      I=Independent, Mod I=Modified Independent, S=Supervision, SBA=Standby Assistance, CGA=Contact Guard Assistance,   Min=Minimal Assistance, Mod=Moderate Assistance, Max=Maximal Assistance, Total=Total Assistance, NT=Not Tested    PLAN:   FREQUENCY AND DURATION: BID for duration of hospital stay or until stated goals are met, whichever comes first.    TREATMENT:   TREATMENT:   Therapeutic Exercise (14 Minutes): Therapeutic exercises noted below to improve functional activity tolerance, AROM, strength, and mobility.     TREATMENT GRID:   Date:  1/27 Date:   Date:     Activity/Exercise Seated

## 2024-01-27 NOTE — CONSULTS
Gastroenterology and Interventional Endoscopy Consult Note    Date of visit   :  01/27/24    Referring Physician: Dr. Munoz     Patient name :  Ama Chu  YOB: 1950    HPI:    Patient is a 73 y.o. year old female, who we are consulted for anemia.  medical history of morbid obesity, rheumatoid arthritis, neuropathy, hypertension, depression who presented with left lower leg pain that has persisted for the past week after sliding off her couch.  Patient was evaluated by her orthopedic surgeon who noted that she had a left displaced femur fracture and recommended patient go to the ER to be admitted for surgery. She denies any melena/hematochezia/ hematemesis. No nausea/vomiting. Reports constipated and last BM was 6 days ago. She was noted post op to have a declining Hgb and GI was consulted. She does endorse NSAID use as well prior to coming to surgery.  Daughter at bedside confirms above history. No smoking presently. Former smoker. No alcohol. No drug use.  ____________________      Labs:  Lab Results   Component Value Date    HGB 8.0 (L) 01/27/2024    HCT 26.3 (L) 01/27/2024    WBC 9.2 01/27/2024     01/27/2024    MCV 96.3 01/27/2024     01/27/2024    K 4.2 01/27/2024     01/27/2024    CO2 35 (H) 01/27/2024    BUN 16 01/27/2024       Lab Results   Component Value Date    INR 1.1 01/23/2024       ____________________    Past Medical History:  GERD  Constipation  RA    Surgical History:  Past Surgical History:   Procedure Laterality Date    LEG SURGERY Left 1/24/2024    LEFT FEMUR IM NAIL RUFUS INSERTION RETROGRADE performed by Carter Braga MD at [facility] MAIN OR       Medications:  No current facility-administered medications on file prior to encounter.     Current Outpatient Medications on File Prior to Encounter   Medication Sig Dispense Refill    LORazepam (ATIVAN) 2 MG tablet Take 1 tablet by mouth nightly. Max Daily Amount: 2 mg      folic acid (FOLVITE) 1 MG tablet Take 1  Personality Changes, No Delusions, No SI/HI, No Social Issues, No Memory Changes, No Violence/Abuse Hx., No Eating Concerns      ____________________    Physical Exam:  BP (!) 101/51   Pulse 82   Temp 98.4 °F (36.9 °C) (Oral)   Resp 18   Ht 1.651 m (5' 5\")   Wt (!) 142.9 kg (315 lb)   SpO2 94%   BMI 52.42 kg/m²           Constitutional: Alert, oriented.  No acute distress. obese  Head: Normocephalic and Atraumatic  Eyes: No icterus, EOMI, no congestion  ENT: No deformity, no hearing loss   Cardiovascular: Regular rate and rhythm, S1-S2 normal, no murmur rub or gallop  Respiratory: Clear to auscultation bilaterally no wheezing rhonchi or crackles  Gastrointestinal:, No hepatosplenomegaly nontender nondistended bowel sounds present in all 4 quadrants  Musculoskeletal: No joint deformity, no swelling in larger joints including knees elbows hands shoulders. Recent orthopedic surgical wound noted.   Skin: No new rash.  Neurologic: Alert oriented to time place and person , good affect    ____________________    Assessment and Orders:  Anemia  Constipation  Recent fracture      Recommendations:  --will plan to pursue EGD to evaluate hgb drop. History of NSAID use does make gastric/duodenal ulceration slightly higher chance than other etiologies such as as cancer. Other etiologies include recent orthopedic procedure.  --if egd negative, reocmmend outpatient colonoscopy  --recommend aggressive bowel regimen, such as Miralax or Dulcolax suppositories or even trialing Mag Citrate        Sri Shaw MD  Gastroenterology and Interventional Endoscopy

## 2024-01-27 NOTE — OP NOTE
Procedure: EGD    Indication: Decreasing Hgb. Anemia of unknown origin    Date of Procedure: 1/27/2024    Patient profile: Refer to patient note in chart for documentation of history and physical    Providers: Sri Shaw MD    Referring MD: Dr. Munoz    PCP: Jose Miguel Jimenez Jr., MD    Medicines: Monitored anesthesia care    Complication: No immediate complications.     Estimated blood loss: Minimal    Procedure: After the risks including, but not limited to medication reaction, infection, bleeding, perforation, missed lesions, benefits and alternatives of the procedure were discussed with the patient and all questions were answered, informed consent was obtained. The gastroscope was passed under direct vision throughout the procedure, the patient's blood pressure pulse and oxygen saturation were monitored continuously. The scope was introduced through the mouth and advanced to the 2nd portion of the duodenum. The endoscopy was performed without difficulty. The patient tolerated the procedure well.       Findings:   --The adult gastroscope was introduced from the mouth and advanced through the esophagus to the GE junction.   --The esophagus was normal  --The scope was advanced to the stomach which was  normal normal in forward and retroflexed views. A hiatal hernia was noted. In the gastric antrum, clean based erosions noted consistent with NSAID gastritis. Mucosal edema noted as well.  --Duodenum was normal to 2nd portion.    The scope was pulled back, and the procedure was subsequently ended.    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 If NSAID absolutely needed, can use after meals  --H.pylori stool Ag.   --daily bowel regimen.  --GI will sign off at this time. Please page/call with questions/concerns    Sri Shaw MD  Gastroenterology and Interventional Endoscopy

## 2024-01-27 NOTE — PROGRESS NOTES
TRANSFER - IN REPORT:    Verbal report received from Destini HARVEY on Ama Chu  being received from 7th floor for routine progression of patient care      Report consisted of patient's Situation, Background, Assessment and   Recommendations(SBAR).     Information from the following report(s) Nurse Handoff Report was reviewed with the receiving nurse.    Opportunity for questions and clarification was provided.      Assessment completed upon patient's arrival to unit and care assumed.

## 2024-01-27 NOTE — PROGRESS NOTES
IM nail rashad insertion POD 3  Leukocytosis resolved  -Pain control as needed with morphine and tramadol  Ortho on board, appreciate recommendations  -PT/OT recommended STR    Symptomatic anemia  Hemoglobin stable 8  S/p EGD was normal   Continue PPI twice daily  GI signed off, appreciate recommendations     Rheumatoid arthritis  Holding celecoxib  -Continue prednisone 5 mg daily  -Continue Plaquenil 200 mg twice daily  -Hold methotrexate and Enbrel injections while inpatient     Neuropathy  Continue with Lyrica twice daily     Hypertension  Antihypertensives amlodipine, torsemide and metoprolol    Constipation  Pain medications may be contributing  S/p  enema.     CKD 3  Renal function appears to be around baseline.  Will continue to monitor while inpatient.     Depression  Mood appears to be at baseline as per daughters  -Continue with doxepin nightly  -Continue with trazodone nightly  -Continue with Lexapro daily    Urinary incontinence  Patient uses pure wick at home.  She is on torsemide to help her with the urinary output.  -Continue torsemide and Flomax  \  Dispo anticipate hospital stay for 1 to 2 days. Pending STR      PT/OT evals and PPD ordered?   STR  ADULT DIET; Regular  VTE prophylaxis: lovenox  Code status: Full Code      Non-peripheral Lines and Tubes (if present):      External Urinary Catheter (Active)        Telemetry (if present):           Hospital Problems:  Principal Problem:    Closed disp comminuted fracture of shaft of left femur with malunion  Resolved Problems:    * No resolved hospital problems. *      Objective:   Patient Vitals for the past 24 hrs:   Temp Pulse Resp BP SpO2   01/27/24 1106 -- -- 18 -- --   01/27/24 0941 -- 85 18 137/63 95 %   01/27/24 0936 -- 85 20 (!) 134/56 96 %   01/27/24 0931 -- 84 20 135/63 96 %   01/27/24 0926 -- 86 18 (!) 132/55 97 %   01/27/24 0921 97.5 °F (36.4 °C) 83 18 131/62 99 %   01/27/24 0916 -- 85 18 130/60 98 %   01/27/24 0911 -- 83 18 129/66 99 %  RDW 16.9 (H) 11.9 - 14.6 %    Platelets 255 150 - 450 K/uL    MPV 10.6 9.4 - 12.3 FL    nRBC 0.00 0.0 - 0.2 K/uL    Differential Type AUTOMATED      Neutrophils % 78 43 - 78 %    Lymphocytes % 9 (L) 13 - 44 %    Monocytes % 11 4.0 - 12.0 %    Eosinophils % 1 0.5 - 7.8 %    Basophils % 0 0.0 - 2.0 %    Immature Granulocytes 1 0.0 - 5.0 %    Neutrophils Absolute 7.5 1.7 - 8.2 K/UL    Lymphocytes Absolute 0.9 0.5 - 4.6 K/UL    Monocytes Absolute 1.0 0.1 - 1.3 K/UL    Eosinophils Absolute 0.1 0.0 - 0.8 K/UL    Basophils Absolute 0.0 0.0 - 0.2 K/UL    Absolute Immature Granulocyte 0.1 0.0 - 0.5 K/UL   Basic Metabolic Panel w/ Reflex to MG    Collection Time: 01/26/24  5:34 AM   Result Value Ref Range    Sodium 139 136 - 146 mmol/L    Potassium 3.9 3.5 - 5.1 mmol/L    Chloride 103 103 - 113 mmol/L    CO2 33 (H) 21 - 32 mmol/L    Anion Gap 3 2 - 11 mmol/L    Glucose 117 (H) 65 - 100 mg/dL    BUN 18 8 - 23 MG/DL    Creatinine 0.80 0.6 - 1.0 MG/DL    Est, Glom Filt Rate >60 >60 ml/min/1.73m2    Calcium 8.7 8.3 - 10.4 MG/DL   Phosphorus    Collection Time: 01/26/24  5:34 AM   Result Value Ref Range    Phosphorus 3.1 2.3 - 3.7 MG/DL   CBC with Auto Differential    Collection Time: 01/27/24  4:21 AM   Result Value Ref Range    WBC 9.2 4.3 - 11.1 K/uL    RBC 2.73 (L) 4.05 - 5.2 M/uL    Hemoglobin 8.0 (L) 11.7 - 15.4 g/dL    Hematocrit 26.3 (L) 35.8 - 46.3 %    MCV 96.3 82 - 102 FL    MCH 29.3 26.1 - 32.9 PG    MCHC 30.4 (L) 31.4 - 35.0 g/dL    RDW 17.0 (H) 11.9 - 14.6 %    Platelets 301 150 - 450 K/uL    MPV 10.7 9.4 - 12.3 FL    nRBC 0.02 0.0 - 0.2 K/uL    Differential Type AUTOMATED      Neutrophils % 70 43 - 78 %    Lymphocytes % 13 13 - 44 %    Monocytes % 12 4.0 - 12.0 %    Eosinophils % 3 0.5 - 7.8 %    Basophils % 0 0.0 - 2.0 %    Immature Granulocytes 2 0.0 - 5.0 %    Neutrophils Absolute 6.3 1.7 - 8.2 K/UL    Lymphocytes Absolute 1.2 0.5 - 4.6 K/UL    Monocytes Absolute 1.1 0.1 - 1.3 K/UL    Eosinophils Absolute 0.3 0.0

## 2024-01-27 NOTE — ANESTHESIA PRE PROCEDURE
Department of Anesthesiology  Preprocedure Note       Name:  Ama Chu   Age:  73 y.o.  :  1950                                          MRN:  [id number]         Date:  2024      Surgeon: Surgeon(s):  Sri Shaw MD    Procedure: Procedure(s):  EGD BIOPSY    Medications prior to admission:   Prior to Admission medications    Medication Sig Start Date End Date Taking? Authorizing Provider   LORazepam (ATIVAN) 2 MG tablet Take 1 tablet by mouth nightly. Max Daily Amount: 2 mg   Yes Anita Vee MD   folic acid (FOLVITE) 1 MG tablet Take 1 tablet by mouth daily   Yes Anita Vee MD   celecoxib (CELEBREX) 200 MG capsule Take 1 capsule by mouth daily   Yes Anita Vee MD   HYDROcodone-acetaminophen (NORCO) 5-325 MG per tablet Take 1 tablet by mouth 2 times daily as needed for Pain. Max Daily Amount: 2 tablets   Yes Anita Vee MD   melatonin 3 MG TABS tablet Take 1 tablet by mouth nightly   Yes Anita Vee MD   amLODIPine (NORVASC) 2.5 MG tablet Take 1 tablet by mouth daily    Anita Vee MD   doxepin (SINEQUAN) 100 MG capsule Take 150 mg by mouth nightly    Anita Vee MD   ergocalciferol (ERGOCALCIFEROL) 1.25 MG (65395 UT) capsule Take 1 capsule by mouth once a week    Anita Vee MD   escitalopram (LEXAPRO) 10 MG tablet Take 1 tablet by mouth daily    Anita Vee MD   ferrous sulfate (IRON 325) 325 (65 Fe) MG tablet Take 1 tablet by mouth daily (with breakfast)    Anita Vee MD   hydroxychloroquine (PLAQUENIL) 200 MG tablet Take 1 tablet by mouth 2 times daily    Anita Vee MD   metOLazone (ZAROXOLYN) 5 MG tablet Take 1 tablet by mouth daily as needed (for weight gain more than 2 lbs in 3 days)    Anita Vee MD   metoprolol succinate (TOPROL XL) 25 MG extended release tablet Take 1 tablet by mouth daily    Anita Vee MD   tamsulosin (FLOMAX) 0.4 MG capsule Take 1 capsule

## 2024-01-27 NOTE — PERIOP NOTE
TRANSFER - OUT REPORT:    Verbal report given to CANDICE Mtz on Ama Chu  being transferred to [facility] for routine progression of patient care       Report consisted of patient's Situation, Background, Assessment and   Recommendations(SBAR).     Information from the following report(s) Nurse Handoff Report, Surgery Report, MAR, and Recent Results was reviewed with the receiving nurse.           Lines:   Peripheral IV 01/26/24 Left Forearm (Active)   Site Assessment Clean, dry & intact 01/27/24 0851   Line Status Infusing 01/27/24 0851   Line Care Connections checked and tightened 01/27/24 0851   Phlebitis Assessment No symptoms 01/27/24 0851   Infiltration Assessment 0 01/27/24 0851   Alcohol Cap Used No 01/27/24 0851   Dressing Status Clean, dry & intact 01/27/24 0851   Dressing Type Transparent 01/27/24 0851        Opportunity for questions and clarification was provided.      Patient transported with:  O2 @ 3lpm    Pt urinated a large amount, soiling sheets. We have called for linens as we do not have hercules bed sheets in PACU. Two new dry absorbant cloths pads placed under patient and patient cleaned. This was all verbalized to receiving RN

## 2024-01-27 NOTE — ANESTHESIA POSTPROCEDURE EVALUATION
Department of Anesthesiology  Postprocedure Note    Patient: Ama Chu  MRN: [id number]  YOB: 1950  Date of evaluation: 1/27/2024    Procedure Summary       Date: 01/27/24 Room / Location: [facility] ENDO FLOURO 1 / [facility] ENDOSCOPY    Anesthesia Start: 0832 Anesthesia Stop: 0852    Procedure: EGD BIOPSY (Upper GI Region) Diagnosis:       Iron deficiency anemia      (Iron deficiency anemia [D50.9])    Surgeons: Sri Shwa MD Responsible Provider: Josep Ferreira MD    Anesthesia Type: TIVA ASA Status: 4            Anesthesia Type: No value filed.    Kandi Phase I: Kandi Score: 10    Kandi Phase II:      Anesthesia Post Evaluation    Patient location during evaluation: bedside  Patient participation: complete - patient participated  Level of consciousness: awake and alert  Airway patency: patent  Nausea & Vomiting: no vomiting  Cardiovascular status: hemodynamically stable  Respiratory status: acceptable  Hydration status: euvolemic  Pain management: adequate    No notable events documented.

## 2024-01-28 ENCOUNTER — APPOINTMENT (OUTPATIENT)
Dept: GENERAL RADIOLOGY | Age: 74
DRG: 481 | End: 2024-01-28
Attending: FAMILY MEDICINE
Payer: MEDICARE

## 2024-01-28 LAB
ANION GAP SERPL CALC-SCNC: 3 MMOL/L (ref 2–11)
B PERT DNA SPEC QL NAA+PROBE: NOT DETECTED
BORDETELLA PARAPERTUSSIS BY PCR: NOT DETECTED
BUN SERPL-MCNC: 11 MG/DL (ref 8–23)
C PNEUM DNA SPEC QL NAA+PROBE: NOT DETECTED
CALCIUM SERPL-MCNC: 8.7 MG/DL (ref 8.3–10.4)
CHLORIDE SERPL-SCNC: 105 MMOL/L (ref 103–113)
CK SERPL-CCNC: 26 U/L (ref 21–215)
CO2 SERPL-SCNC: 31 MMOL/L (ref 21–32)
CREAT SERPL-MCNC: 0.5 MG/DL (ref 0.6–1)
ERYTHROCYTE [DISTWIDTH] IN BLOOD BY AUTOMATED COUNT: 17.3 % (ref 11.9–14.6)
FLUAV SUBTYP SPEC NAA+PROBE: NOT DETECTED
FLUBV RNA SPEC QL NAA+PROBE: NOT DETECTED
GLUCOSE SERPL-MCNC: 98 MG/DL (ref 65–100)
HADV DNA SPEC QL NAA+PROBE: NOT DETECTED
HCOV 229E RNA SPEC QL NAA+PROBE: NOT DETECTED
HCOV HKU1 RNA SPEC QL NAA+PROBE: NOT DETECTED
HCOV NL63 RNA SPEC QL NAA+PROBE: NOT DETECTED
HCOV OC43 RNA SPEC QL NAA+PROBE: NOT DETECTED
HCT VFR BLD AUTO: 26.2 % (ref 35.8–46.3)
HGB BLD-MCNC: 7.8 G/DL (ref 11.7–15.4)
HMPV RNA SPEC QL NAA+PROBE: NOT DETECTED
HPIV1 RNA SPEC QL NAA+PROBE: NOT DETECTED
HPIV2 RNA SPEC QL NAA+PROBE: NOT DETECTED
HPIV3 RNA SPEC QL NAA+PROBE: NOT DETECTED
HPIV4 RNA SPEC QL NAA+PROBE: NOT DETECTED
LACTATE SERPL-SCNC: 1.5 MMOL/L (ref 0.4–2)
LACTATE SERPL-SCNC: 1.6 MMOL/L (ref 0.4–2)
LIPASE SERPL-CCNC: 15 U/L (ref 73–393)
M PNEUMO DNA SPEC QL NAA+PROBE: NOT DETECTED
MAGNESIUM SERPL-MCNC: 2.7 MG/DL (ref 1.8–2.4)
MCH RBC QN AUTO: 29 PG (ref 26.1–32.9)
MCHC RBC AUTO-ENTMCNC: 29.8 G/DL (ref 31.4–35)
MCV RBC AUTO: 97.4 FL (ref 82–102)
NRBC # BLD: 0.07 K/UL (ref 0–0.2)
PHOSPHATE SERPL-MCNC: 3.4 MG/DL (ref 2.3–3.7)
PLATELET # BLD AUTO: 347 K/UL (ref 150–450)
PMV BLD AUTO: 10.4 FL (ref 9.4–12.3)
POTASSIUM SERPL-SCNC: 4.5 MMOL/L (ref 3.5–5.1)
RBC # BLD AUTO: 2.69 M/UL (ref 4.05–5.2)
RSV RNA SPEC QL NAA+PROBE: NOT DETECTED
RV+EV RNA SPEC QL NAA+PROBE: NOT DETECTED
SARS-COV-2 RNA RESP QL NAA+PROBE: NOT DETECTED
SODIUM SERPL-SCNC: 139 MMOL/L (ref 136–146)
TROPONIN I SERPL HS-MCNC: 4.4 PG/ML (ref 0–14)
WBC # BLD AUTO: 9.1 K/UL (ref 4.3–11.1)

## 2024-01-28 PROCEDURE — 83735 ASSAY OF MAGNESIUM: CPT

## 2024-01-28 PROCEDURE — 83605 ASSAY OF LACTIC ACID: CPT

## 2024-01-28 PROCEDURE — 87186 SC STD MICRODIL/AGAR DIL: CPT

## 2024-01-28 PROCEDURE — 97530 THERAPEUTIC ACTIVITIES: CPT

## 2024-01-28 PROCEDURE — 87088 URINE BACTERIA CULTURE: CPT

## 2024-01-28 PROCEDURE — 6360000002 HC RX W HCPCS: Performed by: STUDENT IN AN ORGANIZED HEALTH CARE EDUCATION/TRAINING PROGRAM

## 2024-01-28 PROCEDURE — 97112 NEUROMUSCULAR REEDUCATION: CPT

## 2024-01-28 PROCEDURE — 2500000003 HC RX 250 WO HCPCS: Performed by: FAMILY MEDICINE

## 2024-01-28 PROCEDURE — 36415 COLL VENOUS BLD VENIPUNCTURE: CPT

## 2024-01-28 PROCEDURE — 6370000000 HC RX 637 (ALT 250 FOR IP): Performed by: INTERNAL MEDICINE

## 2024-01-28 PROCEDURE — 71045 X-RAY EXAM CHEST 1 VIEW: CPT

## 2024-01-28 PROCEDURE — 81001 URINALYSIS AUTO W/SCOPE: CPT

## 2024-01-28 PROCEDURE — 83690 ASSAY OF LIPASE: CPT

## 2024-01-28 PROCEDURE — 2580000003 HC RX 258: Performed by: INTERNAL MEDICINE

## 2024-01-28 PROCEDURE — 80048 BASIC METABOLIC PNL TOTAL CA: CPT

## 2024-01-28 PROCEDURE — 6370000000 HC RX 637 (ALT 250 FOR IP): Performed by: ORTHOPAEDIC SURGERY

## 2024-01-28 PROCEDURE — 84484 ASSAY OF TROPONIN QUANT: CPT

## 2024-01-28 PROCEDURE — 84100 ASSAY OF PHOSPHORUS: CPT

## 2024-01-28 PROCEDURE — 0202U NFCT DS 22 TRGT SARS-COV-2: CPT

## 2024-01-28 PROCEDURE — 85027 COMPLETE CBC AUTOMATED: CPT

## 2024-01-28 PROCEDURE — 6370000000 HC RX 637 (ALT 250 FOR IP): Performed by: STUDENT IN AN ORGANIZED HEALTH CARE EDUCATION/TRAINING PROGRAM

## 2024-01-28 PROCEDURE — 1100000000 HC RM PRIVATE

## 2024-01-28 PROCEDURE — 87086 URINE CULTURE/COLONY COUNT: CPT

## 2024-01-28 PROCEDURE — 87040 BLOOD CULTURE FOR BACTERIA: CPT

## 2024-01-28 PROCEDURE — 82550 ASSAY OF CK (CPK): CPT

## 2024-01-28 PROCEDURE — 6360000002 HC RX W HCPCS: Performed by: ORTHOPAEDIC SURGERY

## 2024-01-28 PROCEDURE — 2580000003 HC RX 258: Performed by: STUDENT IN AN ORGANIZED HEALTH CARE EDUCATION/TRAINING PROGRAM

## 2024-01-28 RX ORDER — SODIUM CHLORIDE 0.9 % (FLUSH) 0.9 %
5-40 SYRINGE (ML) INJECTION PRN
Status: DISCONTINUED | OUTPATIENT
Start: 2024-01-28 | End: 2024-01-30 | Stop reason: HOSPADM

## 2024-01-28 RX ORDER — SODIUM CHLORIDE, SODIUM LACTATE, POTASSIUM CHLORIDE, AND CALCIUM CHLORIDE .6; .31; .03; .02 G/100ML; G/100ML; G/100ML; G/100ML
30 INJECTION, SOLUTION INTRAVENOUS ONCE
Status: DISCONTINUED | OUTPATIENT
Start: 2024-01-28 | End: 2024-01-28

## 2024-01-28 RX ORDER — SODIUM CHLORIDE 9 MG/ML
INJECTION, SOLUTION INTRAVENOUS PRN
Status: DISCONTINUED | OUTPATIENT
Start: 2024-01-28 | End: 2024-01-30 | Stop reason: HOSPADM

## 2024-01-28 RX ORDER — 0.9 % SODIUM CHLORIDE 0.9 %
1000 INTRAVENOUS SOLUTION INTRAVENOUS ONCE
Status: COMPLETED | OUTPATIENT
Start: 2024-01-28 | End: 2024-01-28

## 2024-01-28 RX ORDER — SODIUM CHLORIDE 0.9 % (FLUSH) 0.9 %
5-40 SYRINGE (ML) INJECTION EVERY 12 HOURS SCHEDULED
Status: DISCONTINUED | OUTPATIENT
Start: 2024-01-28 | End: 2024-01-30 | Stop reason: HOSPADM

## 2024-01-28 RX ADMIN — TAMSULOSIN HYDROCHLORIDE 0.4 MG: 0.4 CAPSULE ORAL at 09:56

## 2024-01-28 RX ADMIN — PANTOPRAZOLE SODIUM 40 MG: 40 TABLET, DELAYED RELEASE ORAL at 17:08

## 2024-01-28 RX ADMIN — SODIUM CHLORIDE, PRESERVATIVE FREE 5 ML: 5 INJECTION INTRAVENOUS at 12:10

## 2024-01-28 RX ADMIN — ASPIRIN 325 MG: 325 TABLET, COATED ORAL at 21:18

## 2024-01-28 RX ADMIN — PREDNISONE 5 MG: 5 TABLET ORAL at 09:55

## 2024-01-28 RX ADMIN — TRAMADOL HYDROCHLORIDE 50 MG: 50 TABLET ORAL at 18:10

## 2024-01-28 RX ADMIN — ASPIRIN 325 MG: 325 TABLET, COATED ORAL at 09:55

## 2024-01-28 RX ADMIN — PANTOPRAZOLE SODIUM 40 MG: 40 TABLET, DELAYED RELEASE ORAL at 05:35

## 2024-01-28 RX ADMIN — MORPHINE SULFATE 4 MG: 2 INJECTION, SOLUTION INTRAMUSCULAR; INTRAVENOUS at 11:24

## 2024-01-28 RX ADMIN — PIPERACILLIN AND TAZOBACTAM 3375 MG: 3; .375 INJECTION, POWDER, FOR SOLUTION INTRAVENOUS at 22:32

## 2024-01-28 RX ADMIN — SODIUM CHLORIDE: 9 INJECTION, SOLUTION INTRAVENOUS at 17:13

## 2024-01-28 RX ADMIN — PIPERACILLIN AND TAZOBACTAM 4500 MG: 4; .5 INJECTION, POWDER, FOR SOLUTION INTRAVENOUS at 17:14

## 2024-01-28 RX ADMIN — HYDROXYCHLOROQUINE SULFATE 200 MG: 200 TABLET ORAL at 21:18

## 2024-01-28 RX ADMIN — TRAZODONE HYDROCHLORIDE 300 MG: 50 TABLET ORAL at 21:18

## 2024-01-28 RX ADMIN — ANTI-FUNGAL POWDER MICONAZOLE NITRATE TALC FREE: 1.42 POWDER TOPICAL at 09:00

## 2024-01-28 RX ADMIN — LORAZEPAM 2 MG: 1 TABLET ORAL at 21:17

## 2024-01-28 RX ADMIN — FOLIC ACID 1 MG: 1 TABLET ORAL at 09:55

## 2024-01-28 RX ADMIN — ANTI-FUNGAL POWDER MICONAZOLE NITRATE TALC FREE: 1.42 POWDER TOPICAL at 21:19

## 2024-01-28 RX ADMIN — ESCITALOPRAM OXALATE 10 MG: 10 TABLET ORAL at 09:55

## 2024-01-28 RX ADMIN — Medication 2500 MG: at 18:02

## 2024-01-28 RX ADMIN — PREGABALIN 75 MG: 75 CAPSULE ORAL at 21:18

## 2024-01-28 RX ADMIN — PREGABALIN 75 MG: 75 CAPSULE ORAL at 09:56

## 2024-01-28 RX ADMIN — Medication 3 MG: at 21:18

## 2024-01-28 RX ADMIN — FERROUS SULFATE TAB 325 MG (65 MG ELEMENTAL FE) 325 MG: 325 (65 FE) TAB at 09:55

## 2024-01-28 RX ADMIN — DOXEPIN HYDROCHLORIDE 150 MG: 50 CAPSULE ORAL at 21:17

## 2024-01-28 RX ADMIN — SODIUM CHLORIDE, PRESERVATIVE FREE 10 ML: 5 INJECTION INTRAVENOUS at 21:19

## 2024-01-28 RX ADMIN — HYDROXYCHLOROQUINE SULFATE 200 MG: 200 TABLET ORAL at 09:55

## 2024-01-28 RX ADMIN — SODIUM CHLORIDE 1000 ML: 9 INJECTION, SOLUTION INTRAVENOUS at 12:09

## 2024-01-28 ASSESSMENT — PAIN SCALES - GENERAL
PAINLEVEL_OUTOF10: 7
PAINLEVEL_OUTOF10: 5
PAINLEVEL_OUTOF10: 3
PAINLEVEL_OUTOF10: 0

## 2024-01-28 ASSESSMENT — PAIN DESCRIPTION - PAIN TYPE: TYPE: SURGICAL PAIN

## 2024-01-28 ASSESSMENT — PAIN DESCRIPTION - LOCATION
LOCATION: LEG
LOCATION: LEG

## 2024-01-28 ASSESSMENT — PAIN DESCRIPTION - DESCRIPTORS
DESCRIPTORS: ACHING
DESCRIPTORS: ACHING

## 2024-01-28 ASSESSMENT — PAIN DESCRIPTION - FREQUENCY: FREQUENCY: CONTINUOUS

## 2024-01-28 ASSESSMENT — PAIN DESCRIPTION - ORIENTATION
ORIENTATION: LEFT
ORIENTATION: LEFT

## 2024-01-28 ASSESSMENT — PAIN - FUNCTIONAL ASSESSMENT
PAIN_FUNCTIONAL_ASSESSMENT: ACTIVITIES ARE NOT PREVENTED
PAIN_FUNCTIONAL_ASSESSMENT: PREVENTS OR INTERFERES SOME ACTIVE ACTIVITIES AND ADLS

## 2024-01-28 NOTE — PROGRESS NOTES
VANCO DAILY FOLLOW UP NOTE  [facility]   Pharmacy Pharmacokinetic Monitoring Service - Vancomycin    Consulting Provider: Dr. Munoz   Indication: Sepsis of unknown etiology  Target Concentration: Goal AUC/HIRAL 400-600 mg*hr/L  Day of Therapy: 1  Additional Antimicrobials: piperacillin-tazobactam    Patient eligible for piperacillin-tazobactam to cefepime auto-substitution per P&T approved protocol? NO    Pertinent Laboratory Values:   Wt Readings from Last 1 Encounters:   01/27/24 (!) 142.9 kg (315 lb)     Temp Readings from Last 1 Encounters:   01/28/24 100.4 °F (38 °C)     Recent Labs     01/26/24  0534 01/27/24  0421 01/28/24  0530 01/28/24  0923 01/28/24  1035   BUN 18 16 11  --   --    CREATININE 0.80 0.60 0.50*  --   --    WBC 9.6 9.2 9.1  --   --    LACTSEPSIS  --   --   --  1.5 1.6     Estimated Creatinine Clearance: 147 mL/min (A) (based on SCr of 0.5 mg/dL (L)).    No results found for: \"VANCOTROUGH\", \"VANCORANDOM\"    MRSA Nasal Swab: ordered    Assessment:  Date/Time Dose Concentration AUC         Note: Serum concentrations collected for AUC dosing may appear elevated if collected in close proximity to the dose administered, this is not necessarily an indication of toxicity    Plan:  Dosing recommendations based on Bayesian software  Start vancomycin 2500 mg x 1 followed by 1000 mg IV every 12 hours  Anticipated AUC of 512 and trough concentration of 14.9 at steady state  Renal labs as indicated   Vancomycin concentrations will be ordered as clinically appropriate   Pharmacy will continue to monitor patient and adjust therapy as indicated    Thank you for the consult,  Olman Sargent RPH

## 2024-01-28 NOTE — PROGRESS NOTES
ACUTE OCCUPATIONAL THERAPY GOALS:   (Developed with and agreed upon by patient and/or caregiver.)    1. Patient will complete UPPER body bathing and dressing with SET UP and adaptive equipment as needed.     2. Patient will complete functional bed mobility for ADLs with MINIMAL ASSIST.   3. Patient will complete grooming ADL in unsupported sitting with SET UP.  4. Patient will tolerate 25 minutes of OT treatment with 1-2 rest breaks to increase activity tolerance for ADLs.   5. Patient will complete functional transfers with MODERATE ASSIST and adaptive equipment as needed.   6. Patient will tolerate 10 minutes BUE exercises to increase strength for safe, functional transfers.   OCCUPATIONAL THERAPY: Daily Note AM  OT Visit Days: 3   Time In/Out  OT Charge Capture  Rehab Caseload Tracker  OT Orders    WBAT for transfers only    Ama Chu is a 73 y.o. female   PRIMARY DIAGNOSIS: Closed disp comminuted fracture of shaft of left femur with malunion  Closed disp comminuted fracture of shaft of left femur with malunion [S72.352P]  Procedure(s) (LRB):  EGD BIOPSY (N/A)  1 Day Post-Op  Inpatient: Payor: MEDICARE / Plan: MEDICARE PART A AND B / Product Type: *No Product type* /     ASSESSMENT:     REHAB RECOMMENDATIONS:   Recommendation to date pending progress:  Setting:  Short-term Rehab    Equipment:    To Be Determined     ASSESSMENT:  Ms. Chu presents with generalized weakness, decreased activity tolerance, and decreased independence with mobility. Max A x 3 for supine<> sit transfer to EOB; fair EOB sitting balance. Max A x 3 for slide board transfer from bed to chair. Pt demonstrating good participation and use of BUE during slide board transfer to chair. Tolerated well. Slowly progressing towards therapy goals. Will continue OT efforts as indicated.        SUBJECTIVE:     Ms. Chu states, \"Y'all are so nice.\"    Social/Functional Lives With: Daughter  Type of Home: House  Home Layout: Two level, Able to  Live on Main level with bedroom/bathroom  Home Access: Ramped entrance  Home Equipment: Hospital bed, Wheelchair-manual (bed pan)  Receives Help From: Family  ADL Assistance: Needs assistance  Ambulation Assistance: Non-ambulatory    OBJECTIVE:     LINES / DRAINS / AIRWAY: None    RESTRICTIONS/PRECAUTIONS:  Restrictions/Precautions  Restrictions/Precautions: Weight Bearing  Lower Extremity Weight Bearing Restrictions  Left Lower Extremity Weight Bearing: Weight Bearing As Tolerated (for transfers only)        PAIN: VITALS / O2:   Pre Treatment:    none        Post Treatment: none Vitals          Oxygen   RA     MOBILITY: I Mod I S SBA CGA Min Mod Max Total  NT x2 Comments:   Bed Mobility    Rolling [] [] [] [] [] [] [] [x] [] [] [x] x3   Supine to Sit [] [] [] [] [] [] [] [x] [] [] [x] x3   Scooting [] [] [] [] [] [] [] [x] [x] [] [x] X3 to scoot from bed to chair   Sit to Supine [] [] [] [] [] [] [] [] [] [] []    Transfers    Sit to Stand [] [] [] [] [] [] [] [] [] [] []    Bed to Chair [] [] [] [] [] [] [] [] [x] [] [] X 3 (slide board transfer)   Stand to Sit [] [] [] [] [] [] [] [] [] [] []    Tub/Shower [] [] [] [] [] [] [] [] [] [] []     Toilet [] [] [] [] [] [] [] [] [] [] []      [] [] [] [] [] [] [] [] [] [] []    I=Independent, Mod I=Modified Independent, S=Supervision/Setup, SBA=Standby Assistance, CGA=Contact Guard Assistance, Min=Minimal Assistance, Mod=Moderate Assistance, Max=Maximal Assistance, Total=Total Assistance, NT=Not Tested    ACTIVITIES OF DAILY LIVING: I Mod I S SBA CGA Min Mod Max Total NT Comments   BASIC ADLs:              Upper Body   Bathing [] [] [] [] [] [] [] [] [] [x]     Lower Body Bathing [] [] [] [] [] [] [] [] [] [x]     Toileting [] [] [] [] [] [] [] [] [] [x]    Upper Body Dressing [] [] [] [] [] [] [] [] [] [x]    Lower Body Dressing [] [] [] [] [] [] [] [] [] [x]    Feeding [] [] [] [] [] [] [] [] [] [x]    Grooming [] [] [] [] [] [] [] [] [] [x]    Personal Device Care

## 2024-01-28 NOTE — PROGRESS NOTES
01/28/24 0748 (!) 100.8 °F (38.2 °C) 100 18 (!) 123/55 92 %   01/27/24 1931 98.2 °F (36.8 °C) 87 18 (!) 120/57 94 %       Oxygen Therapy  SpO2: 92 %  Pulse via Oximetry: 85 beats per minute  Pulse Oximeter Device Mode: Continuous  Pulse Oximeter Device Location: Left, Finger  O2 Device: Nasal cannula  O2 Flow Rate (L/min): 3 L/min    Estimated body mass index is 52.42 kg/m² as calculated from the following:    Height as of this encounter: 1.651 m (5' 5\").    Weight as of this encounter: 142.9 kg (315 lb).    Intake/Output Summary (Last 24 hours) at 1/28/2024 1521  Last data filed at 1/27/2024 1834  Gross per 24 hour   Intake --   Output 250 ml   Net -250 ml         Physical Exam:     General:    Well nourished.    Head:  Normocephalic, atraumatic  Eyes:  Sclerae appear normal.  Pupils equally round.  ENT:  Nares appear normal.  Moist oral mucosa  Neck:  No restricted ROM.  Trachea midline   CV:   RRR.  No m/r/g.  No jugular venous distension.  Lungs:   CTAB.  No wheezing, rhonchi, or rales.  Symmetric expansion.  Abdomen:   Soft, nontender, nondistended.  Extremities: No cyanosis or clubbing.  No edema, LLE dressing is present, clean intact and dry.  Staples are clean.    Skin:     No rashes and normal coloration.   Warm and dry.  Sacrum has a wound which is clean.  Neuro:  CN II-XII grossly intact.    Psych:  Normal mood and affect.      I have personally reviewed labs and tests:  Recent Labs:  Recent Results (from the past 48 hour(s))   CBC with Auto Differential    Collection Time: 01/27/24  4:21 AM   Result Value Ref Range    WBC 9.2 4.3 - 11.1 K/uL    RBC 2.73 (L) 4.05 - 5.2 M/uL    Hemoglobin 8.0 (L) 11.7 - 15.4 g/dL    Hematocrit 26.3 (L) 35.8 - 46.3 %    MCV 96.3 82 - 102 FL    MCH 29.3 26.1 - 32.9 PG    MCHC 30.4 (L) 31.4 - 35.0 g/dL    RDW 17.0 (H) 11.9 - 14.6 %    Platelets 301 150 - 450 K/uL    MPV 10.7 9.4 - 12.3 FL    nRBC 0.02 0.0 - 0.2 K/uL    Differential Type AUTOMATED      Neutrophils % 70 43 -  IntraVENous Q6H PRN    aspirin EC tablet 325 mg  325 mg Oral BID    sodium chloride flush 0.9 % injection 5-40 mL  5-40 mL IntraVENous 2 times per day    sodium chloride flush 0.9 % injection 5-40 mL  5-40 mL IntraVENous PRN    0.9 % sodium chloride infusion   IntraVENous PRN    potassium chloride (KLOR-CON M) extended release tablet 40 mEq  40 mEq Oral PRN    Or    potassium bicarb-citric acid (EFFER-K) effervescent tablet 40 mEq  40 mEq Oral PRN    Or    potassium chloride 10 mEq/100 mL IVPB (Peripheral Line)  10 mEq IntraVENous PRN    magnesium sulfate 2000 mg in 50 mL IVPB premix  2,000 mg IntraVENous PRN    polyethylene glycol (GLYCOLAX) packet 17 g  17 g Oral Daily PRN    acetaminophen (TYLENOL) tablet 650 mg  650 mg Oral Q6H PRN    Or    acetaminophen (TYLENOL) suppository 650 mg  650 mg Rectal Q6H PRN    traMADol (ULTRAM) tablet 50 mg  50 mg Oral Q6H PRN       Signed:  Felicita Munoz MD    Part of this note may have been written by using a voice dictation software.  The note has been proof read but may still contain some grammatical/other typographical errors.

## 2024-01-28 NOTE — PROGRESS NOTES
ACUTE PHYSICAL THERAPY GOALS:   (Developed with and agreed upon by patient and/or caregiver.)    (1.) Ama Chu  will move from supine to sit and sit to supine  with MINIMAL ASSIST within 7 treatment day(s).    (2.) Ama Chu will transfer from bed to chair and chair to bed with MINIMAL ASSIST using the least restrictive device within 7 treatment day(s).    (3.) Ama Chu will perform sitting static and dynamic balance activities x 20 minutes with SUPERVISION to improve safety within 7 treatment day(s).  (4.) Ama Chu will perform therapeutic exercises x 20 min for HEP with INDEPENDENCE to improve strength, endurance, and functional mobility within 7 treatment day(s).     PHYSICAL THERAPY: Daily Note AM  & PM  (Link to Caseload Tracking: PT Visit Days : 4  Time In/Out PT Charge Capture  Rehab Caseload Tracker  Orders     WBAT FOR TRANSFERS ONLY    Ama Chu is a 73 y.o. female   PRIMARY DIAGNOSIS: Closed disp comminuted fracture of shaft of left femur with malunion  Closed disp comminuted fracture of shaft of left femur with malunion [S72.352P]  Procedure(s) (LRB):  EGD BIOPSY (N/A)  1 Day Post-Op  Inpatient: Payor: MEDICARE / Plan: MEDICARE PART A AND B / Product Type: *No Product type* /     ASSESSMENT:     REHAB RECOMMENDATIONS:   Recommendation to date pending progress:  Setting:  Short-term Rehab    Equipment:    To Be Determined     ASSESSMENT:  Ms. Chu was in bed contact and agreeable to work with therapy. The PT session today focused on bed mobility, supine <> sit, sliding board transfers and active/AA ex to LE's.           SUBJECTIVE:   Ms. Chu states, \"You ladies are so nice\".     Social/Functional Lives With: Daughter  Type of Home: House  Home Layout: Two level, Able to Live on Main level with bedroom/bathroom  Home Access: Ramped entrance  Home Equipment: Hospital bed, Wheelchair-manual (bed pan)  Receives Help From: Family  ADL Assistance: Needs assistance  Ambulation

## 2024-01-29 ENCOUNTER — APPOINTMENT (OUTPATIENT)
Dept: CT IMAGING | Age: 74
DRG: 481 | End: 2024-01-29
Attending: FAMILY MEDICINE
Payer: MEDICARE

## 2024-01-29 LAB
ANION GAP SERPL CALC-SCNC: 1 MMOL/L (ref 2–11)
APPEARANCE UR: CLEAR
BACTERIA URNS QL MICRO: ABNORMAL /HPF
BILIRUB UR QL: NEGATIVE
BUN SERPL-MCNC: 8 MG/DL (ref 8–23)
CALCIUM SERPL-MCNC: 8.4 MG/DL (ref 8.3–10.4)
CHLORIDE SERPL-SCNC: 107 MMOL/L (ref 103–113)
CO2 SERPL-SCNC: 30 MMOL/L (ref 21–32)
COLOR UR: ABNORMAL
CREAT SERPL-MCNC: 0.6 MG/DL (ref 0.6–1)
EPI CELLS #/AREA URNS HPF: ABNORMAL /HPF
ERYTHROCYTE [DISTWIDTH] IN BLOOD BY AUTOMATED COUNT: 17.6 % (ref 11.9–14.6)
GLUCOSE SERPL-MCNC: 107 MG/DL (ref 65–100)
GLUCOSE UR STRIP.AUTO-MCNC: NEGATIVE MG/DL
HCT VFR BLD AUTO: 24.9 % (ref 35.8–46.3)
HGB BLD-MCNC: 7.6 G/DL (ref 11.7–15.4)
HGB UR QL STRIP: NEGATIVE
KETONES UR QL STRIP.AUTO: NEGATIVE MG/DL
LEUKOCYTE ESTERASE UR QL STRIP.AUTO: ABNORMAL
MAGNESIUM SERPL-MCNC: 2.3 MG/DL (ref 1.8–2.4)
MCH RBC QN AUTO: 29.8 PG (ref 26.1–32.9)
MCHC RBC AUTO-ENTMCNC: 30.5 G/DL (ref 31.4–35)
MCV RBC AUTO: 97.6 FL (ref 82–102)
NITRITE UR QL STRIP.AUTO: NEGATIVE
NRBC # BLD: 0.07 K/UL (ref 0–0.2)
OTHER OBSERVATIONS: ABNORMAL
PH UR STRIP: 6 (ref 5–9)
PHOSPHATE SERPL-MCNC: 3.4 MG/DL (ref 2.3–3.7)
PLATELET # BLD AUTO: 387 K/UL (ref 150–450)
PMV BLD AUTO: 10.2 FL (ref 9.4–12.3)
POTASSIUM SERPL-SCNC: 4.2 MMOL/L (ref 3.5–5.1)
PROCALCITONIN SERPL-MCNC: 0.18 NG/ML (ref 0–0.49)
PROT UR STRIP-MCNC: NEGATIVE MG/DL
RBC # BLD AUTO: 2.55 M/UL (ref 4.05–5.2)
RBC #/AREA URNS HPF: ABNORMAL /HPF
SARS-COV-2 RDRP RESP QL NAA+PROBE: NOT DETECTED
SODIUM SERPL-SCNC: 138 MMOL/L (ref 136–146)
SOURCE: NORMAL
SP GR UR REFRACTOMETRY: 1.01 (ref 1–1.02)
UROBILINOGEN UR QL STRIP.AUTO: 0.2 EU/DL (ref 0.2–1)
WBC # BLD AUTO: 8.6 K/UL (ref 4.3–11.1)
WBC URNS QL MICRO: ABNORMAL /HPF

## 2024-01-29 PROCEDURE — 84100 ASSAY OF PHOSPHORUS: CPT

## 2024-01-29 PROCEDURE — 6370000000 HC RX 637 (ALT 250 FOR IP): Performed by: INTERNAL MEDICINE

## 2024-01-29 PROCEDURE — 87635 SARS-COV-2 COVID-19 AMP PRB: CPT

## 2024-01-29 PROCEDURE — 73701 CT LOWER EXTREMITY W/DYE: CPT

## 2024-01-29 PROCEDURE — 6360000002 HC RX W HCPCS: Performed by: STUDENT IN AN ORGANIZED HEALTH CARE EDUCATION/TRAINING PROGRAM

## 2024-01-29 PROCEDURE — 83735 ASSAY OF MAGNESIUM: CPT

## 2024-01-29 PROCEDURE — 85027 COMPLETE CBC AUTOMATED: CPT

## 2024-01-29 PROCEDURE — 1100000000 HC RM PRIVATE

## 2024-01-29 PROCEDURE — 2580000003 HC RX 258: Performed by: STUDENT IN AN ORGANIZED HEALTH CARE EDUCATION/TRAINING PROGRAM

## 2024-01-29 PROCEDURE — 80048 BASIC METABOLIC PNL TOTAL CA: CPT

## 2024-01-29 PROCEDURE — 84145 PROCALCITONIN (PCT): CPT

## 2024-01-29 PROCEDURE — 97530 THERAPEUTIC ACTIVITIES: CPT

## 2024-01-29 PROCEDURE — 6360000002 HC RX W HCPCS: Performed by: ORTHOPAEDIC SURGERY

## 2024-01-29 PROCEDURE — 36415 COLL VENOUS BLD VENIPUNCTURE: CPT

## 2024-01-29 PROCEDURE — 6370000000 HC RX 637 (ALT 250 FOR IP): Performed by: ORTHOPAEDIC SURGERY

## 2024-01-29 PROCEDURE — 6370000000 HC RX 637 (ALT 250 FOR IP): Performed by: STUDENT IN AN ORGANIZED HEALTH CARE EDUCATION/TRAINING PROGRAM

## 2024-01-29 PROCEDURE — 6360000004 HC RX CONTRAST MEDICATION: Performed by: STUDENT IN AN ORGANIZED HEALTH CARE EDUCATION/TRAINING PROGRAM

## 2024-01-29 PROCEDURE — 97112 NEUROMUSCULAR REEDUCATION: CPT

## 2024-01-29 RX ADMIN — Medication 3 MG: at 20:48

## 2024-01-29 RX ADMIN — IOPAMIDOL 100 ML: 755 INJECTION, SOLUTION INTRAVENOUS at 16:04

## 2024-01-29 RX ADMIN — PREDNISONE 5 MG: 5 TABLET ORAL at 08:28

## 2024-01-29 RX ADMIN — FOLIC ACID 1 MG: 1 TABLET ORAL at 08:27

## 2024-01-29 RX ADMIN — TRAZODONE HYDROCHLORIDE 300 MG: 50 TABLET ORAL at 22:11

## 2024-01-29 RX ADMIN — ASPIRIN 325 MG: 325 TABLET, COATED ORAL at 08:27

## 2024-01-29 RX ADMIN — PREGABALIN 75 MG: 75 CAPSULE ORAL at 08:28

## 2024-01-29 RX ADMIN — HYDROXYCHLOROQUINE SULFATE 200 MG: 200 TABLET ORAL at 20:48

## 2024-01-29 RX ADMIN — VANCOMYCIN HYDROCHLORIDE 1000 MG: 1 INJECTION, POWDER, LYOPHILIZED, FOR SOLUTION INTRAVENOUS at 18:53

## 2024-01-29 RX ADMIN — ASPIRIN 325 MG: 325 TABLET, COATED ORAL at 20:48

## 2024-01-29 RX ADMIN — PIPERACILLIN AND TAZOBACTAM 3375 MG: 3; .375 INJECTION, POWDER, FOR SOLUTION INTRAVENOUS at 23:10

## 2024-01-29 RX ADMIN — PIPERACILLIN AND TAZOBACTAM 3375 MG: 3; .375 INJECTION, POWDER, FOR SOLUTION INTRAVENOUS at 06:37

## 2024-01-29 RX ADMIN — ANTI-FUNGAL POWDER MICONAZOLE NITRATE TALC FREE: 1.42 POWDER TOPICAL at 08:32

## 2024-01-29 RX ADMIN — PANTOPRAZOLE SODIUM 40 MG: 40 TABLET, DELAYED RELEASE ORAL at 05:24

## 2024-01-29 RX ADMIN — VANCOMYCIN HYDROCHLORIDE 1000 MG: 1 INJECTION, POWDER, LYOPHILIZED, FOR SOLUTION INTRAVENOUS at 05:24

## 2024-01-29 RX ADMIN — PREGABALIN 75 MG: 75 CAPSULE ORAL at 20:48

## 2024-01-29 RX ADMIN — TRAMADOL HYDROCHLORIDE 50 MG: 50 TABLET ORAL at 08:27

## 2024-01-29 RX ADMIN — DOXEPIN HYDROCHLORIDE 150 MG: 50 CAPSULE ORAL at 20:48

## 2024-01-29 RX ADMIN — TORSEMIDE 50 MG: 20 TABLET ORAL at 08:28

## 2024-01-29 RX ADMIN — LORAZEPAM 2 MG: 1 TABLET ORAL at 20:48

## 2024-01-29 RX ADMIN — FERROUS SULFATE TAB 325 MG (65 MG ELEMENTAL FE) 325 MG: 325 (65 FE) TAB at 08:27

## 2024-01-29 RX ADMIN — PANTOPRAZOLE SODIUM 40 MG: 40 TABLET, DELAYED RELEASE ORAL at 18:43

## 2024-01-29 RX ADMIN — ESCITALOPRAM OXALATE 10 MG: 10 TABLET ORAL at 08:28

## 2024-01-29 RX ADMIN — MORPHINE SULFATE 4 MG: 2 INJECTION, SOLUTION INTRAMUSCULAR; INTRAVENOUS at 12:58

## 2024-01-29 RX ADMIN — PIPERACILLIN AND TAZOBACTAM 3375 MG: 3; .375 INJECTION, POWDER, FOR SOLUTION INTRAVENOUS at 15:15

## 2024-01-29 RX ADMIN — TAMSULOSIN HYDROCHLORIDE 0.4 MG: 0.4 CAPSULE ORAL at 08:28

## 2024-01-29 RX ADMIN — HYDROXYCHLOROQUINE SULFATE 200 MG: 200 TABLET ORAL at 08:27

## 2024-01-29 RX ADMIN — ANTI-FUNGAL POWDER MICONAZOLE NITRATE TALC FREE: 1.42 POWDER TOPICAL at 20:48

## 2024-01-29 ASSESSMENT — PAIN DESCRIPTION - DESCRIPTORS
DESCRIPTORS: ACHING
DESCRIPTORS: ACHING

## 2024-01-29 ASSESSMENT — PAIN - FUNCTIONAL ASSESSMENT
PAIN_FUNCTIONAL_ASSESSMENT: PREVENTS OR INTERFERES WITH ALL ACTIVE AND SOME PASSIVE ACTIVITIES
PAIN_FUNCTIONAL_ASSESSMENT: PREVENTS OR INTERFERES WITH ALL ACTIVE AND SOME PASSIVE ACTIVITIES

## 2024-01-29 ASSESSMENT — PAIN SCALES - GENERAL
PAINLEVEL_OUTOF10: 3
PAINLEVEL_OUTOF10: 3
PAINLEVEL_OUTOF10: 0
PAINLEVEL_OUTOF10: 5
PAINLEVEL_OUTOF10: 7

## 2024-01-29 ASSESSMENT — PAIN DESCRIPTION - FREQUENCY
FREQUENCY: CONTINUOUS
FREQUENCY: INTERMITTENT

## 2024-01-29 ASSESSMENT — PAIN DESCRIPTION - PAIN TYPE
TYPE: SURGICAL PAIN
TYPE: SURGICAL PAIN

## 2024-01-29 ASSESSMENT — PAIN DESCRIPTION - LOCATION
LOCATION: LEG
LOCATION: LEG

## 2024-01-29 ASSESSMENT — PAIN DESCRIPTION - ORIENTATION
ORIENTATION: LEFT
ORIENTATION: LEFT

## 2024-01-29 ASSESSMENT — PAIN DESCRIPTION - ONSET: ONSET: GRADUAL

## 2024-01-29 NOTE — WOUND CARE
Left leg with bandage from ortho surgery, Clean dry and intact, defer to ortho for treatment of this surgical area, call if needed. Abdomen and groin folds with intertrigo and rash, possible yeast, antifungal powder in use, should be continued. Gluteal cleft and folds and right buttock with scars, white and fully healed,  3 areas total. Patient had sores from lupus years ago and these are the scars that remain. Protect with zinc barrier cream bid and prn. Do not allow patient to stay wet, the scars will breakdown quickly if wet. Patient complained of bed being \"to small\", bariatric bed obtained. Will monitor.

## 2024-01-29 NOTE — PLAN OF CARE
Problem: Discharge Planning  Goal: Discharge to home or other facility with appropriate resources  Outcome: Progressing  Flowsheets (Taken 1/29/2024 0837)  Discharge to home or other facility with appropriate resources: Identify barriers to discharge with patient and caregiver     Problem: Skin/Tissue Integrity  Goal: Absence of new skin breakdown  Description: 1.  Monitor for areas of redness and/or skin breakdown  2.  Assess vascular access sites hourly  3.  Every 4-6 hours minimum:  Change oxygen saturation probe site  4.  Every 4-6 hours:  If on nasal continuous positive airway pressure, respiratory therapy assess nares and determine need for appliance change or resting period.  Outcome: Progressing     Problem: ABCDS Injury Assessment  Goal: Absence of physical injury  Outcome: Progressing     Problem: Safety - Adult  Goal: Free from fall injury  Outcome: Progressing  Flowsheets (Taken 1/29/2024 0837)  Free From Fall Injury: Instruct family/caregiver on patient safety     Problem: Pain  Goal: Verbalizes/displays adequate comfort level or baseline comfort level  Outcome: Progressing  Flowsheets (Taken 1/29/2024 0827)  Verbalizes/displays adequate comfort level or baseline comfort level: Encourage patient to monitor pain and request assistance

## 2024-01-29 NOTE — PLAN OF CARE
Problem: Discharge Planning  Goal: Discharge to home or other facility with appropriate resources  1/28/2024 2031 by Dinora Forman RN  Outcome: Progressing  1/28/2024 1915 by Lula Mcdonald RN  Outcome: Progressing     Problem: Skin/Tissue Integrity  Goal: Absence of new skin breakdown  Description: 1.  Monitor for areas of redness and/or skin breakdown  2.  Assess vascular access sites hourly  3.  Every 4-6 hours minimum:  Change oxygen saturation probe site  4.  Every 4-6 hours:  If on nasal continuous positive airway pressure, respiratory therapy assess nares and determine need for appliance change or resting period.  1/28/2024 2031 by Dinora Forman RN  Outcome: Progressing  1/28/2024 1915 by Lula Mcdonald RN  Outcome: Progressing     Problem: ABCDS Injury Assessment  Goal: Absence of physical injury  1/28/2024 2031 by Dinora Forman RN  Outcome: Progressing  1/28/2024 1915 by Lula Mcdonald RN  Outcome: Progressing     Problem: Safety - Adult  Goal: Free from fall injury  1/28/2024 2031 by Dinora Forman RN  Outcome: Progressing  1/28/2024 1915 by Lula Mcdonald RN  Outcome: Progressing  Flowsheets (Taken 1/28/2024 0750)  Free From Fall Injury: Instruct family/caregiver on patient safety     Problem: Pain  Goal: Verbalizes/displays adequate comfort level or baseline comfort level  1/28/2024 2031 by Dinora Forman RN  Outcome: Progressing  1/28/2024 1915 by Lula Mcdonald RN  Outcome: Progressing

## 2024-01-29 NOTE — PROGRESS NOTES
VANCO DAILY FOLLOW UP NOTE  [facility]   Pharmacy Pharmacokinetic Monitoring Service - Vancomycin    Consulting Provider: Dr. Munoz   Indication: Sepsis of unknown etiology  Target Concentration: Goal AUC/HIRAL 400-600 mg*hr/L  Day of Therapy: 2  Additional Antimicrobials: piperacillin-tazobactam    Patient eligible for piperacillin-tazobactam to cefepime auto-substitution per P&T approved protocol? NO    Pertinent Laboratory Values:   Wt Readings from Last 1 Encounters:   01/27/24 (!) 142.9 kg (315 lb)     Temp Readings from Last 1 Encounters:   01/29/24 97.7 °F (36.5 °C) (Oral)     Recent Labs     01/27/24  0421 01/28/24  0530 01/28/24  0923 01/28/24  1035 01/29/24  0513   BUN 16 11  --   --  8   CREATININE 0.60 0.50*  --   --  0.60   WBC 9.2 9.1  --   --  8.6   PROCAL  --   --   --   --  0.18   LACTSEPSIS  --   --  1.5 1.6  --      Estimated Creatinine Clearance: 122 mL/min (based on SCr of 0.6 mg/dL).    No results found for: \"VANCOTROUGH\", \"VANCORANDOM\"    MRSA Nasal Swab: ordered    Assessment:  Date/Time Dose Concentration AUC         Note: Serum concentrations collected for AUC dosing may appear elevated if collected in close proximity to the dose administered, this is not necessarily an indication of toxicity    Plan:  Dosing recommendations based on Bayesian software  Continue vancomycin 1000 mg IV every 12 hours  Anticipated AUC of 512 and trough concentration of 14.9 at steady state  Renal labs as indicated   Vancomycin concentrations will be ordered as clinically appropriate   Pharmacy will continue to monitor patient and adjust therapy as indicated    Thank you for the consult,  Paul Oconnor [facility]

## 2024-01-29 NOTE — PROGRESS NOTES
ACUTE PHYSICAL THERAPY GOALS:   (Developed with and agreed upon by patient and/or caregiver.)    (1.) Ama Chu  will move from supine to sit and sit to supine  with MINIMAL ASSIST within 7 treatment day(s).    (2.) Ama Chu will transfer from bed to chair and chair to bed with MINIMAL ASSIST using the least restrictive device within 7 treatment day(s).    (3.) Ama Chu will perform sitting static and dynamic balance activities x 20 minutes with SUPERVISION to improve safety within 7 treatment day(s).  (4.) Ama Chu will perform therapeutic exercises x 20 min for HEP with INDEPENDENCE to improve strength, endurance, and functional mobility within 7 treatment day(s).     PHYSICAL THERAPY: Daily Note AM  & PM  (Link to Caseload Tracking: PT Visit Days : 5  Time In/Out PT Charge Capture  Rehab Caseload Tracker  Orders     WBAT FOR TRANSFERS ONLY    Ama Chu is a 73 y.o. female   PRIMARY DIAGNOSIS: Closed disp comminuted fracture of shaft of left femur with malunion  Closed disp comminuted fracture of shaft of left femur with malunion [S72.352P]  Procedure(s) (LRB):  EGD ESOPHAGOGASTRODUODENOSCOPY (N/A)  2 Days Post-Op  Inpatient: Payor: MEDICARE / Plan: MEDICARE PART A AND B / Product Type: *No Product type* /     ASSESSMENT:     REHAB RECOMMENDATIONS:   Recommendation to date pending progress:  Setting:  Short-term Rehab    Equipment:    To Be Determined     ASSESSMENT:  Ms. Chu was in bed in AM on contact and agreeable to work with therapy. The PT session focused on bed mobility, supine <> sit, attempted standing with RW then with standing frame, sliding board transfers and active/AA ex to LE's.  In PM pt returned to bed via sheet transfer and worked on bed mobility.          SUBJECTIVE:   Ms. Chu states, \"I feel okay\".     Social/Functional Lives With: Daughter  Type of Home: House  Home Layout: Two level, Able to Live on Main level with bedroom/bathroom  Home Access: Ramped  entrance  Home Equipment: Hospital bed, Wheelchair-manual (bed pan)  Receives Help From: Family  ADL Assistance: Needs assistance  Ambulation Assistance: Non-ambulatory  OBJECTIVE:     PAIN: VITALS / O2: PRECAUTION / LINES / DRAINS:   Pre Treatment:  not rated         Post Treatment:  not rated Vitals        Oxygen    External Catheter    RESTRICTIONS/PRECAUTIONS:        MOBILITY: I Mod I S SBA CGA Min Mod Max Total  NT x2 Comments:   Bed Mobility    Rolling [] [] [] [] [] [] [] [x] [] [] []  X 3   Supine to Sit [] [] [] [] [] [] [] [x] [] [] []  X 3   Scooting [] [] [] [] [] [] [] [x] [] [] []  X 3    Sit to Supine [] [] [] [] [] [] [] [x] [] [] []  X 3   Transfers    Sit to Stand [] [] [] [] [] [] [] [] [] [x] []  Attempt with a x 3 w/o success   Bed to Chair [] [] [] [] [] [] [] [] [] [x] []    Stand to Sit [] [] [] [] [] [] [] [] [] [x] []     [] [] [] [] [] [] [] [] [] [] []    I=Independent, Mod I=Modified Independent, S=Supervision, SBA=Standby Assistance, CGA=Contact Guard Assistance,   Min=Minimal Assistance, Mod=Moderate Assistance, Max=Maximal Assistance, Total=Total Assistance, NT=Not Tested    BALANCE: Good Fair+ Fair Fair- Poor NT Comments   Sitting Static [] [x] [] [] [] []    Sitting Dynamic [] [] [x] [] [] []              Standing Static [] [] [] [] [] [x]    Standing Dynamic [] [] [] [] [] [x]      GAIT: I Mod I S SBA CGA Min Mod Max Total  NT x2 Comments:   Level of Assistance [] [] [] [] [] [] [] [] [] [x] []    Distance   N/a    DME N/A    Gait Quality N/A    Weightbearing Status      Stairs      I=Independent, Mod I=Modified Independent, S=Supervision, SBA=Standby Assistance, CGA=Contact Guard Assistance,   Min=Minimal Assistance, Mod=Moderate Assistance, Max=Maximal Assistance, Total=Total Assistance, NT=Not Tested    PLAN:   FREQUENCY AND DURATION: BID for duration of hospital stay or until stated goals are met, whichever comes first.    TREATMENT:   TREATMENT: Therapeutic Activity (39

## 2024-01-29 NOTE — PROGRESS NOTES
Hospitalist Progress Note   Admit Date:  2024  7:30 PM   Name:  Ama Chu   Age:  73 y.o.  Sex:  female  :  1950   MRN:  [id number]   Room:  [facility]/    Presenting/Chief Complaint: No chief complaint on file.     Reason(s) for Admission: Closed disp comminuted fracture of shaft of left femur with malunion [S72.352P]     Hospital Course:   Ama Chu is a 73 y.o. female with medical history of morbid obesity, rheumatoid arthritis, neuropathy, hypertension, depression who presented with left lower leg pain that has persisted for the past week after sliding off her couch.  Patient was evaluated by her orthopedic surgeon who noted that she had a left displaced femur fracture and recommended patient go to the ER to be admitted for surgery.  Patient reports that she suffered the injury approximately 1 week ago.  She was sitting on the couch and was attempting to reach for something when she slid off of it and hurt her left leg.  Since then, she has had pain and was unable to bear weight.  She denied any worsening numbness, weakness in the lower extremity.  She denies any current fevers, chills, shortness of breath, chest pain, nausea, vomiting.     In the ER, patient had x-ray of the left femur which showed a displaced left femur fracture.  As per ER physician, case was discussed with orthopedic surgeon who recommended transfer to [facility] for surgery tomorrow with Dr. Braga's team.     Patient currently lives with her daughter.  She is independent in her ADLs and IADLs.  Recently, she has been using a wheelchair to get around she has had difficulty walking.  She is a former smoker, nondrinker, nondrug use.     Pt had EGD on  and was normal.       Subjective & 24hr Events:   Patient was seen and examined at the bedside.  She spiked fever 100.8 F and 100.4F yesterday.  She was asymptomatic..  Patient denies fever, chills, nausea, vomiting, diarrhea.      Assessment &

## 2024-01-29 NOTE — PLAN OF CARE
Problem: Discharge Planning  Goal: Discharge to home or other facility with appropriate resources  Outcome: Progressing     Problem: Skin/Tissue Integrity  Goal: Absence of new skin breakdown  Description: 1.  Monitor for areas of redness and/or skin breakdown  2.  Assess vascular access sites hourly  3.  Every 4-6 hours minimum:  Change oxygen saturation probe site  4.  Every 4-6 hours:  If on nasal continuous positive airway pressure, respiratory therapy assess nares and determine need for appliance change or resting period.  Outcome: Progressing     Problem: ABCDS Injury Assessment  Goal: Absence of physical injury  Outcome: Progressing     Problem: Safety - Adult  Goal: Free from fall injury  Outcome: Progressing  Flowsheets (Taken 1/28/2024 1390)  Free From Fall Injury: Instruct family/caregiver on patient safety     Problem: Pain  Goal: Verbalizes/displays adequate comfort level or baseline comfort level  Outcome: Progressing

## 2024-01-29 NOTE — CARE COORDINATION
Bed offer received from [facility].  Patient is not medically ready per attending as she is febrile.  CM will plan for patient to discharge tomorrow.  Medicare for placed with patient's paper chart for attending to sign.  Patient placed on [facility] Will Call transport for tomorrow; . Reference number [id number].  Call placed to patient's daughter and HIPAA compliant VM left.  Patient and daughter updated at bedside and white board in room updated.  2nd ILM completed.  Rapid covid testing ordered.

## 2024-01-30 VITALS
HEIGHT: 65 IN | OXYGEN SATURATION: 93 % | SYSTOLIC BLOOD PRESSURE: 124 MMHG | BODY MASS INDEX: 48.82 KG/M2 | TEMPERATURE: 98.1 F | HEART RATE: 99 BPM | DIASTOLIC BLOOD PRESSURE: 92 MMHG | WEIGHT: 293 LBS | RESPIRATION RATE: 17 BRPM

## 2024-01-30 LAB
ANION GAP SERPL CALC-SCNC: 4 MMOL/L (ref 2–11)
BUN SERPL-MCNC: 11 MG/DL (ref 8–23)
CALCIUM SERPL-MCNC: 8.4 MG/DL (ref 8.3–10.4)
CHLORIDE SERPL-SCNC: 104 MMOL/L (ref 103–113)
CO2 SERPL-SCNC: 32 MMOL/L (ref 21–32)
CREAT SERPL-MCNC: 0.6 MG/DL (ref 0.6–1)
ERYTHROCYTE [DISTWIDTH] IN BLOOD BY AUTOMATED COUNT: 17.6 % (ref 11.9–14.6)
GLUCOSE SERPL-MCNC: 114 MG/DL (ref 65–100)
HCT VFR BLD AUTO: 26.7 % (ref 35.8–46.3)
HGB BLD-MCNC: 8.2 G/DL (ref 11.7–15.4)
MAGNESIUM SERPL-MCNC: 2.2 MG/DL (ref 1.8–2.4)
MCH RBC QN AUTO: 29.5 PG (ref 26.1–32.9)
MCHC RBC AUTO-ENTMCNC: 30.7 G/DL (ref 31.4–35)
MCV RBC AUTO: 96 FL (ref 82–102)
MM INDURATION, POC: 0 MM (ref 0–5)
NRBC # BLD: 0.02 K/UL (ref 0–0.2)
PHOSPHATE SERPL-MCNC: 3.7 MG/DL (ref 2.3–3.7)
PLATELET # BLD AUTO: 441 K/UL (ref 150–450)
PMV BLD AUTO: 10 FL (ref 9.4–12.3)
POTASSIUM SERPL-SCNC: 3.7 MMOL/L (ref 3.5–5.1)
PPD, POC: NEGATIVE
RBC # BLD AUTO: 2.78 M/UL (ref 4.05–5.2)
SODIUM SERPL-SCNC: 140 MMOL/L (ref 136–146)
VANCOMYCIN SERPL-MCNC: 14.1 UG/ML
WBC # BLD AUTO: 7.6 K/UL (ref 4.3–11.1)

## 2024-01-30 PROCEDURE — 80202 ASSAY OF VANCOMYCIN: CPT

## 2024-01-30 PROCEDURE — 80048 BASIC METABOLIC PNL TOTAL CA: CPT

## 2024-01-30 PROCEDURE — 36415 COLL VENOUS BLD VENIPUNCTURE: CPT

## 2024-01-30 PROCEDURE — 6360000002 HC RX W HCPCS: Performed by: STUDENT IN AN ORGANIZED HEALTH CARE EDUCATION/TRAINING PROGRAM

## 2024-01-30 PROCEDURE — 6370000000 HC RX 637 (ALT 250 FOR IP): Performed by: INTERNAL MEDICINE

## 2024-01-30 PROCEDURE — 6370000000 HC RX 637 (ALT 250 FOR IP): Performed by: ORTHOPAEDIC SURGERY

## 2024-01-30 PROCEDURE — 85027 COMPLETE CBC AUTOMATED: CPT

## 2024-01-30 PROCEDURE — 2580000003 HC RX 258: Performed by: STUDENT IN AN ORGANIZED HEALTH CARE EDUCATION/TRAINING PROGRAM

## 2024-01-30 PROCEDURE — 83735 ASSAY OF MAGNESIUM: CPT

## 2024-01-30 PROCEDURE — 97530 THERAPEUTIC ACTIVITIES: CPT

## 2024-01-30 PROCEDURE — 6360000002 HC RX W HCPCS: Performed by: ORTHOPAEDIC SURGERY

## 2024-01-30 PROCEDURE — 6370000000 HC RX 637 (ALT 250 FOR IP): Performed by: STUDENT IN AN ORGANIZED HEALTH CARE EDUCATION/TRAINING PROGRAM

## 2024-01-30 PROCEDURE — 84100 ASSAY OF PHOSPHORUS: CPT

## 2024-01-30 PROCEDURE — 97112 NEUROMUSCULAR REEDUCATION: CPT

## 2024-01-30 PROCEDURE — 97110 THERAPEUTIC EXERCISES: CPT

## 2024-01-30 RX ORDER — ASPIRIN 325 MG
325 TABLET, DELAYED RELEASE (ENTERIC COATED) ORAL 2 TIMES DAILY
Qty: 30 TABLET | Refills: 0 | Status: SHIPPED | OUTPATIENT
Start: 2024-01-30 | End: 2024-02-29

## 2024-01-30 RX ORDER — HYDROCODONE BITARTRATE AND ACETAMINOPHEN 5; 325 MG/1; MG/1
2 TABLET ORAL EVERY 8 HOURS PRN
Qty: 6 TABLET | Refills: 0 | Status: SHIPPED | OUTPATIENT
Start: 2024-01-30 | End: 2024-02-01

## 2024-01-30 RX ORDER — PANTOPRAZOLE SODIUM 40 MG/1
40 TABLET, DELAYED RELEASE ORAL DAILY
Qty: 30 TABLET | Refills: 3 | Status: SHIPPED | OUTPATIENT
Start: 2024-01-30

## 2024-01-30 RX ORDER — TORSEMIDE 100 MG/1
50 TABLET ORAL NIGHTLY
Qty: 30 TABLET | Refills: 0 | Status: SHIPPED | OUTPATIENT
Start: 2024-01-30

## 2024-01-30 RX ORDER — CEFPODOXIME PROXETIL 200 MG/1
200 TABLET, FILM COATED ORAL 2 TIMES DAILY
Qty: 10 TABLET | Refills: 0 | Status: SHIPPED | OUTPATIENT
Start: 2024-01-30 | End: 2024-02-04

## 2024-01-30 RX ORDER — TORSEMIDE 100 MG/1
50 TABLET ORAL NIGHTLY
Qty: 30 TABLET | Refills: 0 | Status: SHIPPED | OUTPATIENT
Start: 2024-01-30 | End: 2024-01-30

## 2024-01-30 RX ADMIN — HYDROXYCHLOROQUINE SULFATE 200 MG: 200 TABLET ORAL at 09:32

## 2024-01-30 RX ADMIN — ESCITALOPRAM OXALATE 10 MG: 10 TABLET ORAL at 09:31

## 2024-01-30 RX ADMIN — TRAMADOL HYDROCHLORIDE 50 MG: 50 TABLET ORAL at 13:22

## 2024-01-30 RX ADMIN — PREDNISONE 5 MG: 5 TABLET ORAL at 09:33

## 2024-01-30 RX ADMIN — PREGABALIN 75 MG: 75 CAPSULE ORAL at 09:33

## 2024-01-30 RX ADMIN — ASPIRIN 325 MG: 325 TABLET, COATED ORAL at 09:31

## 2024-01-30 RX ADMIN — VANCOMYCIN HYDROCHLORIDE 1000 MG: 1 INJECTION, POWDER, LYOPHILIZED, FOR SOLUTION INTRAVENOUS at 05:14

## 2024-01-30 RX ADMIN — PANTOPRAZOLE SODIUM 40 MG: 40 TABLET, DELAYED RELEASE ORAL at 06:38

## 2024-01-30 RX ADMIN — TAMSULOSIN HYDROCHLORIDE 0.4 MG: 0.4 CAPSULE ORAL at 09:33

## 2024-01-30 RX ADMIN — HYDROCODONE BITARTRATE AND ACETAMINOPHEN 2 TABLET: 5; 325 TABLET ORAL at 06:42

## 2024-01-30 RX ADMIN — MORPHINE SULFATE 4 MG: 2 INJECTION, SOLUTION INTRAMUSCULAR; INTRAVENOUS at 03:23

## 2024-01-30 RX ADMIN — PIPERACILLIN AND TAZOBACTAM 3375 MG: 3; .375 INJECTION, POWDER, FOR SOLUTION INTRAVENOUS at 06:39

## 2024-01-30 RX ADMIN — FOLIC ACID 1 MG: 1 TABLET ORAL at 09:32

## 2024-01-30 RX ADMIN — FERROUS SULFATE TAB 325 MG (65 MG ELEMENTAL FE) 325 MG: 325 (65 FE) TAB at 09:30

## 2024-01-30 ASSESSMENT — PAIN DESCRIPTION - LOCATION
LOCATION: KNEE;HIP
LOCATION: KNEE;HIP
LOCATION: HIP;KNEE

## 2024-01-30 ASSESSMENT — PAIN SCALES - GENERAL
PAINLEVEL_OUTOF10: 6
PAINLEVEL_OUTOF10: 0
PAINLEVEL_OUTOF10: 7
PAINLEVEL_OUTOF10: 0
PAINLEVEL_OUTOF10: 6
PAINLEVEL_OUTOF10: 0

## 2024-01-30 ASSESSMENT — PAIN - FUNCTIONAL ASSESSMENT
PAIN_FUNCTIONAL_ASSESSMENT: PREVENTS OR INTERFERES SOME ACTIVE ACTIVITIES AND ADLS
PAIN_FUNCTIONAL_ASSESSMENT: PREVENTS OR INTERFERES SOME ACTIVE ACTIVITIES AND ADLS

## 2024-01-30 ASSESSMENT — PAIN DESCRIPTION - PAIN TYPE
TYPE: SURGICAL PAIN
TYPE: SURGICAL PAIN

## 2024-01-30 ASSESSMENT — PAIN DESCRIPTION - FREQUENCY
FREQUENCY: INTERMITTENT
FREQUENCY: INTERMITTENT

## 2024-01-30 ASSESSMENT — PAIN DESCRIPTION - DESCRIPTORS
DESCRIPTORS: ACHING
DESCRIPTORS: ACHING
DESCRIPTORS: ACHING;SORE

## 2024-01-30 ASSESSMENT — PAIN DESCRIPTION - ORIENTATION
ORIENTATION: LEFT

## 2024-01-30 ASSESSMENT — PAIN DESCRIPTION - ONSET
ONSET: GRADUAL
ONSET: GRADUAL

## 2024-01-30 NOTE — PROGRESS NOTES
Called report to [facility]-- reviewed patient history and discharge summary-- allowed time to ask questions -- all questions answered

## 2024-01-30 NOTE — PROGRESS NOTES
ACUTE OCCUPATIONAL THERAPY GOALS:   (Developed with and agreed upon by patient and/or caregiver.)    1. Patient will complete UPPER body bathing and dressing with SET UP and adaptive equipment as needed.     2. Patient will complete functional bed mobility for ADLs with MINIMAL ASSIST.   3. Patient will complete grooming ADL in unsupported sitting with SET UP.  4. Patient will tolerate 25 minutes of OT treatment with 1-2 rest breaks to increase activity tolerance for ADLs.   5. Patient will complete functional transfers with MODERATE ASSIST and adaptive equipment as needed.   6. Patient will tolerate 10 minutes BUE exercises to increase strength for safe, functional transfers.   OCCUPATIONAL THERAPY: Daily Note AM  OT Visit Days: 5   Time In/Out  OT Charge Capture  Rehab Caseload Tracker  OT Orders    WBAT for transfers only    Ama Chu is a 73 y.o. female   PRIMARY DIAGNOSIS: Closed disp comminuted fracture of shaft of left femur with malunion  Closed disp comminuted fracture of shaft of left femur with malunion [S72.352P]  Procedure(s) (LRB):  EGD ESOPHAGOGASTRODUODENOSCOPY (N/A)  3 Days Post-Op  Inpatient: Payor: MEDICARE / Plan: MEDICARE PART A AND B / Product Type: *No Product type* /     ASSESSMENT:     REHAB RECOMMENDATIONS:   Recommendation to date pending progress:  Setting:  Short-term Rehab    Equipment:    To Be Determined     ASSESSMENT:  Ms. Chu is doing fair today. Pt presents supine upon arrival. Pt continue to require max A x2 for all bed mobility. Fair sitting balance at edge of bed. Pt did not want to attempt sit to stand transfers today. Pt required max A x2 for scooting up to head of bed. Pt was instructed in UE exercises to increase strength and activity tolerance. Pt tolerated fair. Returned to supine and rolled for linen placement. Minimal progress made today. Pt is to be discharged to SNF today.       SUBJECTIVE:     Ms. Chu states, \"I am okay\"    Social/Functional Lives With:  Daughter  Type of Home: House  Home Layout: Two level, Able to Live on Main level with bedroom/bathroom  Home Access: Ramped entrance  Home Equipment: Hospital bed, Wheelchair-manual (bed pan)  Receives Help From: Family  ADL Assistance: Needs assistance  Ambulation Assistance: Non-ambulatory    OBJECTIVE:     LINES / DRAINS / AIRWAY: None    RESTRICTIONS/PRECAUTIONS:  Restrictions/Precautions  Restrictions/Precautions: Weight Bearing  Lower Extremity Weight Bearing Restrictions  Left Lower Extremity Weight Bearing: Weight Bearing As Tolerated (for transfers only)        PAIN: VITALS / O2:   Pre Treatment:    7        Post Treatment: 7 Vitals          Oxygen   RA     MOBILITY: I Mod I S SBA CGA Min Mod Max Total  NT x2 Comments:   Bed Mobility    Rolling [] [] [] [] [] [] [] [x] [] [] [x] x3   Supine to Sit [] [] [] [] [] [] [] [x] [] [] [x] x3   Scooting [] [] [] [] [] [] [] [x] [x] [] [x] X3 to scoot from bed to chair   Sit to Supine [] [] [] [] [] [] [] [x] [] [] [x]    Transfers    Sit to Stand [] [] [] [] [] [] [] [] [] [x] []    Bed to Chair [] [] [] [] [] [] [] [] [] [x] []    Stand to Sit [] [] [] [] [] [] [] [] [] [x] []    Tub/Shower [] [] [] [] [] [] [] [] [] [x] []    Toilet [] [] [] [] [] [] [] [] [] [x] []     [] [] [] [] [] [] [] [] [] [] []    I=Independent, Mod I=Modified Independent, S=Supervision/Setup, SBA=Standby Assistance, CGA=Contact Guard Assistance, Min=Minimal Assistance, Mod=Moderate Assistance, Max=Maximal Assistance, Total=Total Assistance, NT=Not Tested    ACTIVITIES OF DAILY LIVING: I Mod I S SBA CGA Min Mod Max Total NT Comments   BASIC ADLs:              Upper Body   Bathing [] [] [] [] [] [] [] [] [] [x]     Lower Body Bathing [] [] [] [] [] [] [] [] [] [x]     Toileting [] [] [] [] [] [] [] [] [] [x]    Upper Body Dressing [] [] [] [] [] [] [] [] [] [x]    Lower Body Dressing [] [] [] [] [] [] [] [] [] [x]    Feeding [] [] [] [] [] [] [] [] [] [x]    Grooming [] [] [] [] [] [] []

## 2024-01-30 NOTE — PROGRESS NOTES
ACUTE PHYSICAL THERAPY GOALS:   (Developed with and agreed upon by patient and/or caregiver.)    (1.) Ama Chu  will move from supine to sit and sit to supine  with MINIMAL ASSIST within 7 treatment day(s).    (2.) Ama Chu will transfer from bed to chair and chair to bed with MINIMAL ASSIST using the least restrictive device within 7 treatment day(s).    (3.) Ama Chu will perform sitting static and dynamic balance activities x 20 minutes with SUPERVISION to improve safety within 7 treatment day(s).  (4.) Ama Chu will perform therapeutic exercises x 20 min for HEP with INDEPENDENCE to improve strength, endurance, and functional mobility within 7 treatment day(s).     PHYSICAL THERAPY: Daily Note AM    (Link to Caseload Tracking: PT Visit Days : 6  Time In/Out PT Charge Capture  Rehab Caseload Tracker  Orders     WBAT FOR TRANSFERS ONLY    Ama Chu is a 73 y.o. female   PRIMARY DIAGNOSIS: Closed disp comminuted fracture of shaft of left femur with malunion  Closed disp comminuted fracture of shaft of left femur with malunion [S72.352P]  Procedure(s) (LRB):  EGD ESOPHAGOGASTRODUODENOSCOPY (N/A)  3 Days Post-Op  Inpatient: Payor: MEDICARE / Plan: MEDICARE PART A AND B / Product Type: *No Product type* /     ASSESSMENT:     REHAB RECOMMENDATIONS:   Recommendation to date pending progress:  Setting:  Short-term Rehab    Equipment:    To Be Determined     ASSESSMENT:  Ms. Chu presents with bed in sitting position, agrees to therapy.  She worked on supine to sit with mod assist x 2.  In sitting pt performed B LE exercises with rest breaks as needed and tried some scooting towards head of bed.  She fatigues quickly and requires rest breaks.  She is so pleasant and willing to participate.  She returned supine with max assist x 2 and performed rolling L<>R with mod assist x 2.  She was left with bed in chair position and needs in reach, daughter at bedside.  Will continue with POC.       NT=Not Tested    PLAN:   FREQUENCY AND DURATION: BID for duration of hospital stay or until stated goals are met, whichever comes first.    TREATMENT:   TREATMENT:   Therapeutic Activity (15 Minutes): Therapeutic activity included Supine to Sit, Sit to Supine, Scooting, Lateral Scooting, and Sitting balance  to improve functional Activity tolerance, Balance, Coordination, Mobility, and Strength.  Therapeutic Exercise (11 Minutes): Therapeutic exercises noted below to improve functional activity tolerance, AROM, and strength.        TREATMENT GRID:   Date:  1/27 Date:  1/30/24 Date:     Activity/Exercise Seated Parameters Parameters Parameters   Heel raises      Toe raises      SAQ's X 10 R     Hip Flex X 10 B   AA/P on L X 15 B     Hip ABD X 10 B  AA/P on L     AP's X 15 B X 15 B    QS X 10 B     GS X 10 B           Shld flex X  07 B     UE \"punches\" X 10 B     UE horizontal ABD X 07 B     LAQ  X 15 B         AFTER TREATMENT PRECAUTIONS: Bed, Bed/Chair Locked, Call light within reach, Needs within reach, RN notified, Side rails x3, and Visitors at bedside    INTERDISCIPLINARY COLLABORATION:  RN/ PCT, PT/ PTA, and OT/ REYNA    EDUCATION:      TIME IN/OUT:  Time In: 0940  Time Out: 1006  Minutes: 26      TIMUR SANTOS PTA

## 2024-01-30 NOTE — CARE COORDINATION
Patient is medically ready for discharge per attending and STR facility is ready to admit.  Patient will discharge at 14:30 to room 323 at [facility]; reference number [id number].  Primary RN can call report to [phone].  Patient updated at bedside and white board updated.  2nd ILM was completed yesterday.  [facility] liaison notified in order to follow patient at STR for continuity of care; family and patient aware.        01/30/24 1049   Services At/After Discharge   Mode of Transport at Discharge [facility] Transport Time of Discharge 1500   Confirm Follow Up Transport Family   Condition of Participation: Discharge Planning   The Plan for Transition of Care is related to the following treatment goals: Patient will participate in STR at SNF in order to return home to safe baeline independence and resumption of HH with [facility].   The Patient and/Or Patient Representative agree with the Discharge Plan? Yes

## 2024-01-30 NOTE — PROGRESS NOTES
VANCO DAILY FOLLOW UP NOTE  [facility]   Pharmacy Pharmacokinetic Monitoring Service - Vancomycin    Consulting Provider: Dr. Munoz   Indication: Sepsis of unknown etiology  Target Concentration: Goal AUC/HIRAL 400-600 mg*hr/L  Day of Therapy: 3  Additional Antimicrobials: piperacillin-tazobactam    Patient eligible for piperacillin-tazobactam to cefepime auto-substitution per P&T approved protocol? NO    Pertinent Laboratory Values:   Wt Readings from Last 1 Encounters:   01/27/24 (!) 142.9 kg (315 lb)     Temp Readings from Last 1 Encounters:   01/29/24 99 °F (37.2 °C) (Oral)     Recent Labs     01/28/24  0530 01/28/24  0923 01/28/24  1035 01/29/24  0513 01/30/24  0418   BUN 11  --   --  8 11   CREATININE 0.50*  --   --  0.60 0.60   WBC 9.1  --   --  8.6 7.6   PROCAL  --   --   --  0.18  --    LACTSEPSIS  --  1.5 1.6  --   --      Estimated Creatinine Clearance: 122 mL/min (based on SCr of 0.6 mg/dL).    Lab Results   Component Value Date/Time    VANCORANDOM 14.1 01/30/2024 04:18 AM       MRSA Nasal Swab: ordered    Assessment:  Date/Time Dose Concentration AUC   1/30 0418 1000 mg q12h 14.1 430   Note: Serum concentrations collected for AUC dosing may appear elevated if collected in close proximity to the dose administered, this is not necessarily an indication of toxicity    Plan:  Dosing recommendations based on Bayesian software  Continue vancomycin 1000 mg IV every 12 hours - AUC is therapeutic  Renal labs as indicated   Vancomycin concentrations will be ordered as clinically appropriate   Pharmacy will continue to monitor patient and adjust therapy as indicated    Thank you for the consult,  Quiana Mace, [facility]

## 2024-01-30 NOTE — DISCHARGE SUMMARY
Hospitalist Discharge Summary   Admit Date:  2024  7:30 PM   DC Note date: 2024  Name:  Ama Chu   Age:  73 y.o.  Sex:  female  :  1950   MRN:  [id number]   Room:  [facility]  PCP:  JoseM iguel Jimenez Jr., MD    Presenting Complaint: No chief complaint on file.     Initial Admission Diagnosis: Closed disp comminuted fracture of shaft of left femur with malunion [S72.352P]     Problem List for this Hospitalization (present on admission):    Principal Problem:    Closed disp comminuted fracture of shaft of left femur with malunion  Resolved Problems:    * No resolved hospital problems. *      Hospital Course:  Ama Chu is a 73 y.o. female with medical history of morbid obesity, rheumatoid arthritis, neuropathy, hypertension, depression who presented with left lower leg pain that has persisted after sliding off her couch. Patient was evaluated by her orthopedic surgeon who noted that she had a left displaced femur fracture and recommended patient go to the ER to be admitted for surgery.  .  She was sitting on the couch and was attempting to reach for something when she slid off of it and hurt her left leg.  Since then, she has had pain and was unable to bear weight.  Recently, she has been using a wheelchair to get around she has had difficulty walking.     In the ER, patient had x-ray of the left femur which showed a displaced left femur fracture.  As per ER physician, case was discussed with orthopedic surgeon who recommended transfer to [facility] for surgery  with Dr. Braga's team.  Orthopedics was consulted and S/p left femur IM nail rashad insertion.  Pain was well-controlled with IV morphine.  Her hemoglobin was low.  Celecoxib was discontinued.  GI was consulted. Pt had EGD on  and was normal. She spiked fever x 2 episodes on .  No leukocytosis.  UA negative, procalcitonin and lactic acid normal.  Chest x-ray showed pulmonary congestion.  She was treated

## 2024-01-30 NOTE — PLAN OF CARE
Problem: Discharge Planning  Goal: Discharge to home or other facility with appropriate resources  1/30/2024 0118 by Kari Morris RN  Outcome: Progressing  Flowsheets (Taken 1/29/2024 1938)  Discharge to home or other facility with appropriate resources:   Identify barriers to discharge with patient and caregiver   Arrange for needed discharge resources and transportation as appropriate   Identify discharge learning needs (meds, wound care, etc)     Problem: Skin/Tissue Integrity  Goal: Absence of new skin breakdown  Description: 1.  Monitor for areas of redness and/or skin breakdown  2.  Assess vascular access sites hourly  3.  Every 4-6 hours minimum:  Change oxygen saturation probe site  4.  Every 4-6 hours:  If on nasal continuous positive airway pressure, respiratory therapy assess nares and determine need for appliance change or resting period.  1/30/2024 0118 by Kari Morris RN  Outcome: Progressing     Problem: ABCDS Injury Assessment  Goal: Absence of physical injury  1/30/2024 0118 by Kari Morris RN  Outcome: Progressing  Flowsheets (Taken 1/29/2024 1938)  Absence of Physical Injury: Implement safety measures based on patient assessment     Problem: Safety - Adult  Goal: Free from fall injury  1/30/2024 0118 by Kari Morris RN  Outcome: Progressing  Flowsheets (Taken 1/29/2024 1938)  Free From Fall Injury: Instruct family/caregiver on patient safety     Problem: Pain  Goal: Verbalizes/displays adequate comfort level or baseline comfort level  1/30/2024 0118 by Kari Morris RN  Outcome: Progressing  Flowsheets (Taken 1/29/2024 1938)  Verbalizes/displays adequate comfort level or baseline comfort level:   Encourage patient to monitor pain and request assistance   Assess pain using appropriate pain scale   Administer analgesics based on type and severity of pain and evaluate response   Implement non-pharmacological measures as appropriate and evaluate response     Problem: Hematologic -

## 2024-01-30 NOTE — PROGRESS NOTES
Physician Progress Note      PATIENT:               PA GALLAGHER  CSN #:                  [id number]  :                       1950  ADMIT DATE:       2024 7:30 PM  DISCH DATE:  RESPONDING  PROVIDER #:        Felicita Munoz MD          QUERY TEXT:    Pt admitted with femur fracture and has anemia documented. If possible, please   document in progress notes and discharge summary further specificity   regarding the acuity and type of anemia:    The medical record reflects the following:  Risk Factors: Femur fracture, surgery this admission, gastritis, age    Clinical Indicators:  IM progress notes,\" symptomatic anemia. Hemoglobin   dropped to 7.7.\" GI consulted and underwent EGD  which was noted to be   normal. Noted with admission Hgb 10.5 on  and underwent L femur nail rashad   insertion on . EBL noted 200ml.    Treatment: EGD, GI consult, daily labs    Thank you,  Avila Gaines RN CDI  [email or web address]  Options provided:  -- Anemia due to acute blood loss  -- Anemia due to acute on chronic blood loss  -- Anemia due to iron deficiency  -- Anemia due to postoperative blood loss  -- Anemia due to CKD  -- Dilutional anemia  -- Other - I will add my own diagnosis  -- Disagree - Not applicable / Not valid  -- Disagree - Clinically unable to determine / Unknown  -- Refer to Clinical Documentation Reviewer    PROVIDER RESPONSE TEXT:    This patient has acute blood loss anemia.    Query created by: Avila Gaines on 2024 12:11 PM      Electronically signed by:  Felicita Munoz MD 2024 12:28 PM

## 2024-02-01 ENCOUNTER — TELEPHONE (OUTPATIENT)
Dept: ORTHOPEDIC SURGERY | Age: 74
End: 2024-02-01

## 2024-02-02 LAB
BACTERIA SPEC CULT: ABNORMAL
BACTERIA SPEC CULT: NORMAL
BACTERIA SPEC CULT: NORMAL
SERVICE CMNT-IMP: ABNORMAL
SERVICE CMNT-IMP: NORMAL
SERVICE CMNT-IMP: NORMAL

## 2024-02-06 ENCOUNTER — OFFICE VISIT (OUTPATIENT)
Dept: ORTHOPEDIC SURGERY | Age: 74
End: 2024-02-06

## 2024-02-06 DIAGNOSIS — S72.352P CLOSED DISP COMMINUTED FRACTURE OF SHAFT OF LEFT FEMUR WITH MALUNION: Primary | ICD-10-CM

## 2024-02-06 PROCEDURE — 99024 POSTOP FOLLOW-UP VISIT: CPT | Performed by: ORTHOPAEDIC SURGERY

## 2024-02-06 NOTE — PROGRESS NOTES
Progress Note    Patient: Ama Chu MRN: [id number]  SSN: [id number]    YOB: 1950  Age: 73 y.o.  Sex: female        2/6/2024      Subjective:     Patient is here today in follow-up.  She is 2 weeks out from retrograde intramedullary nail fixation of a very comminuted left supracondylar distal femur fracture.  She is complaining of a lot of pain mostly on the medial side of her left knee    Objective:     There were no vitals filed for this visit.       Physical Exam:     Skin - incision is well healed with no redness or drainage  Motor and sensory function intact in LEFT LOWER extremity  Pulses palpable in LEFT LOWER extremity     XRAY FINDINGS:  Yhzlbppdyet-qyjvgm-vo left distal femur fracture, findings-AP and lateral views of the left knee shows that the overall alignment of the distal femur fracture appears to be very similar to her immediate postoperative films.  There is no loss of alignment or failure of any of the fixation at this early point in time.  Impression-reasonably well aligned left distal femur fracture    Assessment:     2 weeks out from left distal femur fracture    Plan:     So I just try to make sure that the patient and her family understands that she obviously has very good reason to still have pain in her knee as she has a previously arthritic knee that has a very comminuted complex distal femur fracture that has also had a recent surgery.  So obviously in this setting I would be much more surprised if she was not having pain then the fact that she is having pain.  As far as her activity is concerned priority is trying to get this to heal and not give her a situation where her hardware fails and with her size I definitely think we have to be as conservative as possible.  I think she can do range of motion of the left knee and light strengthening exercises of the left knee less than 10 pounds resistance.  I would just keep her completely off of this since she is hurting so

## 2024-02-07 ENCOUNTER — TELEPHONE (OUTPATIENT)
Dept: ORTHOPEDIC SURGERY | Age: 74
End: 2024-02-07

## 2024-02-07 NOTE — TELEPHONE ENCOUNTER
Returned call to the [facility] at [place] and spoke to George gave VO to remove remaining staples at the facility. George voiced complete understanding. LH

## 2024-02-07 NOTE — TELEPHONE ENCOUNTER
She had staples removed yesterday. The nurse from rehab has called her and said they left two staples in. She wants to know if you want them to remove them, if so they will need an order. Please call her daughter, Migdalia.

## 2024-02-09 ENCOUNTER — TELEPHONE (OUTPATIENT)
Dept: ORTHOPEDIC SURGERY | Age: 74
End: 2024-02-09

## 2024-02-09 NOTE — TELEPHONE ENCOUNTER
Call George wetzel at [facility]  [phone] patient Is there for short term rehab and since orders are changed to non wgt bear needs to clarify safe travel orders like if can use merrick lift or slidding board etc.

## 2024-02-27 ENCOUNTER — OFFICE VISIT (OUTPATIENT)
Dept: ORTHOPEDIC SURGERY | Age: 74
End: 2024-02-27

## 2024-02-27 DIAGNOSIS — S72.352P CLOSED DISP COMMINUTED FRACTURE OF SHAFT OF LEFT FEMUR WITH MALUNION: Primary | ICD-10-CM

## 2024-02-27 PROCEDURE — 99024 POSTOP FOLLOW-UP VISIT: CPT | Performed by: ORTHOPAEDIC SURGERY

## 2024-02-27 NOTE — PROGRESS NOTES
Progress Note    Patient: Ama Chu MRN: [id number]  SSN: [id number]    YOB: 1950  Age: 73 y.o.  Sex: female        2/27/2024      Subjective:     Patient is here today now about 5 weeks out from retrograde intramedullary nail fixation of a distal femur fracture.  She is still having some pain in her left knee.  She seems to been very compliant with nonweightbearing    Objective:     There were no vitals filed for this visit.       Physical Exam:     Skin - incision is well healed with no redness or drainage  Motor and sensory function intact in LEFT LOWER extremity  Pulses palpable in LEFT LOWER extremity     XRAY FINDINGS:  Lpfwvubuig-vcsepy-vo left distal femur fracture, findings-true AP and lateral views of the left knee shows a left distal femur fracture still very well aligned on both views.  The overall alignment is satisfactory the hardware is intact.  There does appear to be some very early evidence of healing.  She does have remarkable degenerative changes in her knee with complete obliteration of the medial joint space multiple osteophytes on both the medial lateral compartments of the knee.  Impression-healing well aligned supracondylar femur fracture and a very arthritic knee    Assessment:     5 weeks out from retrograde intramedullary nail fixation of left knee, left knee DJD    Plan:     So I think it is okay now to allow her to essentially be activity as tolerated.  She can be full active and passive range of motion of the left knee full strengthening of the left knee and she now can be weightbearing as tolerated left lower extremity.  In short she has no restriction.  I will see her back in another 6 weeks with AP and lateral left knee on return she will be about 11 weeks out at that time    Signed By: Carter Braga MD     February 27, 2024

## 2024-02-29 ENCOUNTER — APPOINTMENT (OUTPATIENT)
Dept: CT IMAGING | Age: 74
DRG: 546 | End: 2024-02-29
Payer: MEDICARE

## 2024-02-29 ENCOUNTER — APPOINTMENT (OUTPATIENT)
Dept: GENERAL RADIOLOGY | Age: 74
DRG: 546 | End: 2024-02-29
Payer: MEDICARE

## 2024-02-29 ENCOUNTER — HOSPITAL ENCOUNTER (INPATIENT)
Age: 74
LOS: 3 days | Discharge: NURSING, REHAB OR LONG-TERM CARE | DRG: 546 | End: 2024-03-03
Attending: EMERGENCY MEDICINE | Admitting: INTERNAL MEDICINE
Payer: MEDICARE

## 2024-02-29 DIAGNOSIS — M62.81 MUSCLE WEAKNESS OF RIGHT UPPER EXTREMITY: Primary | ICD-10-CM

## 2024-02-29 DIAGNOSIS — R53.1 RIGHT SIDED WEAKNESS: ICD-10-CM

## 2024-02-29 DIAGNOSIS — R53.1 GENERALIZED WEAKNESS: ICD-10-CM

## 2024-02-29 DIAGNOSIS — S72.92XD CLOSED FRACTURE OF LEFT FEMUR WITH ROUTINE HEALING, UNSPECIFIED FRACTURE MORPHOLOGY, UNSPECIFIED PORTION OF FEMUR, SUBSEQUENT ENCOUNTER: ICD-10-CM

## 2024-02-29 DIAGNOSIS — I50.9 ACUTE ON CHRONIC CONGESTIVE HEART FAILURE, UNSPECIFIED HEART FAILURE TYPE (HCC): ICD-10-CM

## 2024-02-29 DIAGNOSIS — E66.01 MORBIDLY OBESE (HCC): ICD-10-CM

## 2024-02-29 PROBLEM — R29.898 WEAKNESS OF RIGHT UPPER EXTREMITY: Status: ACTIVE | Noted: 2024-02-29

## 2024-02-29 LAB
ANION GAP SERPL CALC-SCNC: 0 MMOL/L (ref 2–11)
APPEARANCE UR: CLEAR
BILIRUB UR QL: NEGATIVE
BUN SERPL-MCNC: 10 MG/DL (ref 8–23)
CALCIUM SERPL-MCNC: 9.3 MG/DL (ref 8.3–10.4)
CHLORIDE SERPL-SCNC: 105 MMOL/L (ref 103–113)
CO2 SERPL-SCNC: 35 MMOL/L (ref 21–32)
COLOR UR: NORMAL
CREAT SERPL-MCNC: 0.6 MG/DL (ref 0.6–1)
EKG ATRIAL RATE: 86 BPM
EKG DIAGNOSIS: NORMAL
EKG P AXIS: 39 DEGREES
EKG P-R INTERVAL: 184 MS
EKG Q-T INTERVAL: 381 MS
EKG QRS DURATION: 95 MS
EKG QTC CALCULATION (BAZETT): 456 MS
EKG R AXIS: -27 DEGREES
EKG T AXIS: 6 DEGREES
EKG VENTRICULAR RATE: 86 BPM
ERYTHROCYTE [DISTWIDTH] IN BLOOD BY AUTOMATED COUNT: 18.3 % (ref 11.9–14.6)
EST. AVERAGE GLUCOSE BLD GHB EST-MCNC: 114 MG/DL
GLUCOSE SERPL-MCNC: 74 MG/DL (ref 65–100)
GLUCOSE UR STRIP.AUTO-MCNC: NEGATIVE MG/DL
HBA1C MFR BLD: 5.6 % (ref 4.8–5.6)
HCT VFR BLD AUTO: 30.3 % (ref 35.8–46.3)
HGB BLD-MCNC: 8.9 G/DL (ref 11.7–15.4)
HGB UR QL STRIP: NEGATIVE
KETONES UR QL STRIP.AUTO: NEGATIVE MG/DL
LEUKOCYTE ESTERASE UR QL STRIP.AUTO: NEGATIVE
MCH RBC QN AUTO: 27.6 PG (ref 26.1–32.9)
MCHC RBC AUTO-ENTMCNC: 29.4 G/DL (ref 31.4–35)
MCV RBC AUTO: 93.8 FL (ref 82–102)
NITRITE UR QL STRIP.AUTO: NEGATIVE
NRBC # BLD: 0 K/UL (ref 0–0.2)
NT PRO BNP: 642 PG/ML (ref 5–125)
PH UR STRIP: 6 (ref 5–9)
PLATELET # BLD AUTO: 510 K/UL (ref 150–450)
PMV BLD AUTO: 9.4 FL (ref 9.4–12.3)
POTASSIUM SERPL-SCNC: 3.7 MMOL/L (ref 3.5–5.1)
PROCALCITONIN SERPL-MCNC: 0.05 NG/ML (ref 0–0.49)
PROT UR STRIP-MCNC: NEGATIVE MG/DL
RBC # BLD AUTO: 3.23 M/UL (ref 4.05–5.2)
SODIUM SERPL-SCNC: 140 MMOL/L (ref 136–146)
SP GR UR REFRACTOMETRY: 1.01 (ref 1–1.02)
TROPONIN I SERPL HS-MCNC: 6.5 PG/ML (ref 0–14)
TROPONIN I SERPL HS-MCNC: 6.6 PG/ML (ref 0–14)
UROBILINOGEN UR QL STRIP.AUTO: 1 EU/DL (ref 0.2–1)
WBC # BLD AUTO: 10.3 K/UL (ref 4.3–11.1)

## 2024-02-29 PROCEDURE — 6370000000 HC RX 637 (ALT 250 FOR IP): Performed by: EMERGENCY MEDICINE

## 2024-02-29 PROCEDURE — 99285 EMERGENCY DEPT VISIT HI MDM: CPT

## 2024-02-29 PROCEDURE — 85027 COMPLETE CBC AUTOMATED: CPT

## 2024-02-29 PROCEDURE — 81003 URINALYSIS AUTO W/O SCOPE: CPT

## 2024-02-29 PROCEDURE — 84484 ASSAY OF TROPONIN QUANT: CPT

## 2024-02-29 PROCEDURE — 71045 X-RAY EXAM CHEST 1 VIEW: CPT

## 2024-02-29 PROCEDURE — 6370000000 HC RX 637 (ALT 250 FOR IP): Performed by: INTERNAL MEDICINE

## 2024-02-29 PROCEDURE — 93005 ELECTROCARDIOGRAM TRACING: CPT | Performed by: EMERGENCY MEDICINE

## 2024-02-29 PROCEDURE — 1100000003 HC PRIVATE W/ TELEMETRY

## 2024-02-29 PROCEDURE — 70450 CT HEAD/BRAIN W/O DYE: CPT

## 2024-02-29 PROCEDURE — 84145 PROCALCITONIN (PCT): CPT

## 2024-02-29 PROCEDURE — 6360000002 HC RX W HCPCS: Performed by: INTERNAL MEDICINE

## 2024-02-29 PROCEDURE — 80048 BASIC METABOLIC PNL TOTAL CA: CPT

## 2024-02-29 PROCEDURE — 2580000003 HC RX 258: Performed by: INTERNAL MEDICINE

## 2024-02-29 PROCEDURE — 83880 ASSAY OF NATRIURETIC PEPTIDE: CPT

## 2024-02-29 PROCEDURE — 70498 CT ANGIOGRAPHY NECK: CPT

## 2024-02-29 PROCEDURE — 6360000002 HC RX W HCPCS: Performed by: EMERGENCY MEDICINE

## 2024-02-29 PROCEDURE — 96374 THER/PROPH/DIAG INJ IV PUSH: CPT

## 2024-02-29 PROCEDURE — 83036 HEMOGLOBIN GLYCOSYLATED A1C: CPT

## 2024-02-29 PROCEDURE — 93010 ELECTROCARDIOGRAM REPORT: CPT | Performed by: INTERNAL MEDICINE

## 2024-02-29 PROCEDURE — 6360000004 HC RX CONTRAST MEDICATION: Performed by: INTERNAL MEDICINE

## 2024-02-29 RX ORDER — BISACODYL 10 MG
10 SUPPOSITORY, RECTAL RECTAL DAILY PRN
Status: DISCONTINUED | OUTPATIENT
Start: 2024-02-29 | End: 2024-03-03 | Stop reason: HOSPADM

## 2024-02-29 RX ORDER — FUROSEMIDE 10 MG/ML
40 INJECTION INTRAMUSCULAR; INTRAVENOUS DAILY
Status: DISCONTINUED | OUTPATIENT
Start: 2024-03-01 | End: 2024-03-03 | Stop reason: HOSPADM

## 2024-02-29 RX ORDER — SENNA AND DOCUSATE SODIUM 50; 8.6 MG/1; MG/1
2 TABLET, FILM COATED ORAL DAILY
COMMUNITY

## 2024-02-29 RX ORDER — PREDNISONE 5 MG/1
5 TABLET ORAL DAILY
Status: DISCONTINUED | OUTPATIENT
Start: 2024-03-01 | End: 2024-03-03 | Stop reason: HOSPADM

## 2024-02-29 RX ORDER — LORAZEPAM 1 MG/1
2 TABLET ORAL NIGHTLY
Status: DISCONTINUED | OUTPATIENT
Start: 2024-02-29 | End: 2024-03-03 | Stop reason: HOSPADM

## 2024-02-29 RX ORDER — SODIUM CHLORIDE 0.9 % (FLUSH) 0.9 %
5-40 SYRINGE (ML) INJECTION PRN
Status: DISCONTINUED | OUTPATIENT
Start: 2024-02-29 | End: 2024-03-03 | Stop reason: HOSPADM

## 2024-02-29 RX ORDER — ASPIRIN 81 MG/1
81 TABLET, CHEWABLE ORAL DAILY
Status: DISCONTINUED | OUTPATIENT
Start: 2024-02-29 | End: 2024-03-03 | Stop reason: HOSPADM

## 2024-02-29 RX ORDER — ASPIRIN 300 MG/1
300 SUPPOSITORY RECTAL DAILY
Status: DISCONTINUED | OUTPATIENT
Start: 2024-02-29 | End: 2024-03-03 | Stop reason: HOSPADM

## 2024-02-29 RX ORDER — ESCITALOPRAM OXALATE 10 MG/1
10 TABLET ORAL DAILY
Status: DISCONTINUED | OUTPATIENT
Start: 2024-02-29 | End: 2024-03-03 | Stop reason: HOSPADM

## 2024-02-29 RX ORDER — FERROUS SULFATE 325(65) MG
325 TABLET ORAL
Status: DISCONTINUED | OUTPATIENT
Start: 2024-03-01 | End: 2024-03-03 | Stop reason: HOSPADM

## 2024-02-29 RX ORDER — FOLIC ACID 1 MG/1
1 TABLET ORAL DAILY
Status: DISCONTINUED | OUTPATIENT
Start: 2024-03-01 | End: 2024-03-03 | Stop reason: HOSPADM

## 2024-02-29 RX ORDER — CLOPIDOGREL BISULFATE 75 MG/1
75 TABLET ORAL DAILY
Status: DISCONTINUED | OUTPATIENT
Start: 2024-02-29 | End: 2024-03-03 | Stop reason: HOSPADM

## 2024-02-29 RX ORDER — DOXEPIN HYDROCHLORIDE 50 MG/1
150 CAPSULE ORAL NIGHTLY
Status: DISCONTINUED | OUTPATIENT
Start: 2024-02-29 | End: 2024-03-03 | Stop reason: HOSPADM

## 2024-02-29 RX ORDER — ONDANSETRON 4 MG/1
4 TABLET, ORALLY DISINTEGRATING ORAL EVERY 8 HOURS PRN
Status: DISCONTINUED | OUTPATIENT
Start: 2024-02-29 | End: 2024-03-03 | Stop reason: HOSPADM

## 2024-02-29 RX ORDER — PREGABALIN 75 MG/1
75 CAPSULE ORAL 2 TIMES DAILY
Status: DISCONTINUED | OUTPATIENT
Start: 2024-02-29 | End: 2024-03-03 | Stop reason: HOSPADM

## 2024-02-29 RX ORDER — METOPROLOL SUCCINATE 50 MG/1
50 TABLET, EXTENDED RELEASE ORAL DAILY
Status: DISCONTINUED | OUTPATIENT
Start: 2024-02-29 | End: 2024-03-03 | Stop reason: HOSPADM

## 2024-02-29 RX ORDER — TAMSULOSIN HYDROCHLORIDE 0.4 MG/1
0.4 CAPSULE ORAL DAILY
Status: DISCONTINUED | OUTPATIENT
Start: 2024-02-29 | End: 2024-03-03 | Stop reason: HOSPADM

## 2024-02-29 RX ORDER — ONDANSETRON 4 MG/1
4 TABLET, ORALLY DISINTEGRATING ORAL EVERY 6 HOURS PRN
COMMUNITY

## 2024-02-29 RX ORDER — ACETAMINOPHEN 650 MG/1
650 SUPPOSITORY RECTAL EVERY 4 HOURS PRN
Status: DISCONTINUED | OUTPATIENT
Start: 2024-02-29 | End: 2024-03-03 | Stop reason: HOSPADM

## 2024-02-29 RX ORDER — POLYETHYLENE GLYCOL 3350 17 G/17G
17 POWDER, FOR SOLUTION ORAL DAILY PRN
Status: DISCONTINUED | OUTPATIENT
Start: 2024-02-29 | End: 2024-03-03 | Stop reason: HOSPADM

## 2024-02-29 RX ORDER — NICOTINE 21 MG/24HR
1 PATCH, TRANSDERMAL 24 HOURS TRANSDERMAL DAILY PRN
Status: DISCONTINUED | OUTPATIENT
Start: 2024-02-29 | End: 2024-03-03 | Stop reason: HOSPADM

## 2024-02-29 RX ORDER — ATORVASTATIN CALCIUM 80 MG/1
80 TABLET, FILM COATED ORAL NIGHTLY
Status: DISCONTINUED | OUTPATIENT
Start: 2024-02-29 | End: 2024-03-03 | Stop reason: HOSPADM

## 2024-02-29 RX ORDER — LANOLIN ALCOHOL/MO/W.PET/CERES
3 CREAM (GRAM) TOPICAL
Status: DISCONTINUED | OUTPATIENT
Start: 2024-02-29 | End: 2024-03-03 | Stop reason: HOSPADM

## 2024-02-29 RX ORDER — TRAZODONE HYDROCHLORIDE 50 MG/1
300 TABLET ORAL NIGHTLY
Status: DISCONTINUED | OUTPATIENT
Start: 2024-02-29 | End: 2024-03-03 | Stop reason: HOSPADM

## 2024-02-29 RX ORDER — ENOXAPARIN SODIUM 100 MG/ML
40 INJECTION SUBCUTANEOUS 2 TIMES DAILY
Status: DISCONTINUED | OUTPATIENT
Start: 2024-02-29 | End: 2024-03-03 | Stop reason: HOSPADM

## 2024-02-29 RX ORDER — CELECOXIB 100 MG/1
100 CAPSULE ORAL DAILY
COMMUNITY

## 2024-02-29 RX ORDER — HYDROCODONE BITARTRATE AND ACETAMINOPHEN 5; 325 MG/1; MG/1
1 TABLET ORAL 2 TIMES DAILY PRN
Status: DISCONTINUED | OUTPATIENT
Start: 2024-02-29 | End: 2024-03-03 | Stop reason: HOSPADM

## 2024-02-29 RX ORDER — ONDANSETRON 2 MG/ML
4 INJECTION INTRAMUSCULAR; INTRAVENOUS EVERY 6 HOURS PRN
Status: DISCONTINUED | OUTPATIENT
Start: 2024-02-29 | End: 2024-03-03 | Stop reason: HOSPADM

## 2024-02-29 RX ORDER — HYDROXYCHLOROQUINE SULFATE 200 MG/1
200 TABLET, FILM COATED ORAL 2 TIMES DAILY
Status: DISCONTINUED | OUTPATIENT
Start: 2024-02-29 | End: 2024-03-03 | Stop reason: HOSPADM

## 2024-02-29 RX ORDER — ACETAMINOPHEN 325 MG/1
650 TABLET ORAL EVERY 4 HOURS PRN
Status: DISCONTINUED | OUTPATIENT
Start: 2024-02-29 | End: 2024-03-03 | Stop reason: HOSPADM

## 2024-02-29 RX ORDER — PANTOPRAZOLE SODIUM 40 MG/1
40 TABLET, DELAYED RELEASE ORAL DAILY
Status: DISCONTINUED | OUTPATIENT
Start: 2024-03-01 | End: 2024-03-03 | Stop reason: HOSPADM

## 2024-02-29 RX ORDER — FUROSEMIDE 10 MG/ML
60 INJECTION INTRAMUSCULAR; INTRAVENOUS
Status: COMPLETED | OUTPATIENT
Start: 2024-02-29 | End: 2024-02-29

## 2024-02-29 RX ORDER — ASPIRIN 81 MG/1
81 TABLET, CHEWABLE ORAL DAILY
Status: DISCONTINUED | OUTPATIENT
Start: 2024-02-29 | End: 2024-02-29 | Stop reason: SDUPTHER

## 2024-02-29 RX ORDER — POLYETHYLENE GLYCOL 3350 17 G/17G
17 POWDER, FOR SOLUTION ORAL DAILY
COMMUNITY

## 2024-02-29 RX ORDER — SODIUM CHLORIDE 9 MG/ML
INJECTION, SOLUTION INTRAVENOUS PRN
Status: DISCONTINUED | OUTPATIENT
Start: 2024-02-29 | End: 2024-03-03 | Stop reason: HOSPADM

## 2024-02-29 RX ORDER — ATORVASTATIN CALCIUM 80 MG/1
80 TABLET, FILM COATED ORAL NIGHTLY
Status: DISCONTINUED | OUTPATIENT
Start: 2024-02-29 | End: 2024-02-29 | Stop reason: SDUPTHER

## 2024-02-29 RX ORDER — SODIUM CHLORIDE 0.9 % (FLUSH) 0.9 %
5-40 SYRINGE (ML) INJECTION EVERY 12 HOURS SCHEDULED
Status: DISCONTINUED | OUTPATIENT
Start: 2024-02-29 | End: 2024-03-03 | Stop reason: HOSPADM

## 2024-02-29 RX ADMIN — ESCITALOPRAM OXALATE 10 MG: 10 TABLET ORAL at 22:06

## 2024-02-29 RX ADMIN — LORAZEPAM 2 MG: 1 TABLET ORAL at 22:07

## 2024-02-29 RX ADMIN — DOXEPIN HYDROCHLORIDE 150 MG: 50 CAPSULE ORAL at 22:06

## 2024-02-29 RX ADMIN — Medication 3 MG: at 22:07

## 2024-02-29 RX ADMIN — PREGABALIN 75 MG: 75 CAPSULE ORAL at 22:08

## 2024-02-29 RX ADMIN — ATORVASTATIN CALCIUM 80 MG: 80 TABLET, FILM COATED ORAL at 22:05

## 2024-02-29 RX ADMIN — IOPAMIDOL 50 ML: 755 INJECTION, SOLUTION INTRAVENOUS at 18:27

## 2024-02-29 RX ADMIN — FUROSEMIDE 60 MG: 10 INJECTION, SOLUTION INTRAMUSCULAR; INTRAVENOUS at 11:14

## 2024-02-29 RX ADMIN — CLOPIDOGREL BISULFATE 75 MG: 75 TABLET ORAL at 13:36

## 2024-02-29 RX ADMIN — HYDROXYCHLOROQUINE SULFATE 200 MG: 200 TABLET ORAL at 22:07

## 2024-02-29 RX ADMIN — SODIUM CHLORIDE, PRESERVATIVE FREE 10 ML: 5 INJECTION INTRAVENOUS at 22:08

## 2024-02-29 RX ADMIN — ENOXAPARIN SODIUM 40 MG: 100 INJECTION SUBCUTANEOUS at 22:06

## 2024-02-29 RX ADMIN — METOPROLOL SUCCINATE 50 MG: 50 TABLET, EXTENDED RELEASE ORAL at 22:10

## 2024-02-29 RX ADMIN — TAMSULOSIN HYDROCHLORIDE 0.4 MG: 0.4 CAPSULE ORAL at 18:49

## 2024-02-29 RX ADMIN — TRAZODONE HYDROCHLORIDE 300 MG: 50 TABLET ORAL at 22:08

## 2024-02-29 ASSESSMENT — PAIN SCALES - GENERAL: PAINLEVEL_OUTOF10: 4

## 2024-02-29 ASSESSMENT — PAIN - FUNCTIONAL ASSESSMENT: PAIN_FUNCTIONAL_ASSESSMENT: 0-10

## 2024-02-29 NOTE — PROGRESS NOTES
TRANSFER - IN REPORT:    Verbal report received from Tiffanie BIGGS RN on Ama Chu  being received from ED for routine progression of patient care      Report consisted of patient's Situation, Background, Assessment and   Recommendations(SBAR).     Information from the following report(s) Nurse Handoff Report was reviewed with the receiving nurse.    Opportunity for questions and clarification was provided.      Assessment completed upon patient's arrival to unit and care assumed.

## 2024-02-29 NOTE — ED NOTES
TRANSFER - OUT REPORT:    Verbal report given to Varsha Chu  being transferred to 2nd floor for routine progression of patient care       Report consisted of patient's Situation, Background, Assessment and   Recommendations(SBAR).     Information from the following report(s) Nurse Handoff Report and ED SBAR was reviewed with the receiving nurse.    Komal Fall Assessment:    Presents to emergency department  because of falls (Syncope, seizure, or loss of consciousness): No  Age > 70: No  Altered Mental Status, Intoxication with alcohol or substance confusion (Disorientation, impaired judgment, poor safety awaremess, or inability to follow instructions): No  Impaired Mobility: Ambulates or transfers with assistive devices or assistance; Unable to ambulate or transer.: No  Nursing Judgement: No          Lines:   Peripheral IV 02/29/24 Right Antecubital (Active)        Opportunity for questions and clarification was provided.      Patient transported with:  Tiffanie Gonzalez, CANDICE  02/29/24 1467

## 2024-02-29 NOTE — ED PROVIDER NOTES
aphasia  Dysarthria (10): Normal  Extinction and Inattention (11): No abnormality  Total: 2         No results for input(s): \"COVID19\" in the last 72 hours.    Voice dictation software was used during the making of this note.  This software is not perfect and grammatical and other typographical errors may be present.  This note has not been completely proofread for errors.     Aydin Montes MD  02/29/24 9349

## 2024-02-29 NOTE — H&P
Urobilinogen, Urine 1.0 0.2 - 1.0 EU/dL    Nitrite, Urine Negative NEG      Leukocyte Esterase, Urine Negative NEG     Troponin    Collection Time: 02/29/24 11:06 AM   Result Value Ref Range    Troponin, High Sensitivity 6.6 0 - 14 pg/mL       No results for input(s): \"COVID19\" in the last 72 hours.    CT HEAD WO CONTRAST    Result Date: 2/29/2024  Head CT INDICATION: Right hand weakness TECHNIQUE: Multiple 2D axial images obtained through the brain without intravenous contrast.  Radiation dose reduction techniques were used for this study:  All CT scans performed at this facility use one or all of the following: Automated exposure control, adjustment of the mA and/or kVp according to patient's size, iterative reconstruction. COMPARISON: None FINDINGS: No areas of abnormal attenuation are seen in the brain. There is no CT evidence of acute hemorrhage or infarction. The ventricles are normal in size. There are no extra-axial fluid collections. No masses are seen. The sinuses are clear. Hyperostosis is noted. There are no bony lesions. Vascular calcifications are visible.     No CT evidence of acute intracranial abnormality.    XR CHEST PORTABLE    Result Date: 2/29/2024  CHEST SINGLE VIEW INDICATION: Altered mental status. Weakness. COMPARISON: January 28, 2024. FINDINGS:  A single AP view of the chest is submitted. The heart is enlarged. Bibasilar infiltrates and atelectasis are similar to prior study. Pulmonary vascular congestion. Small bilateral pleural effusions. No pneumothorax.     Stable pulmonary vascular congestion and mild pulmonary edema. Stable small bilateral pleural effusions.         Signed:  Li Che MD    Part of this note may have been written by using a voice dictation software.  The note has been proof read but may still contain some grammatical/other typographical errors.

## 2024-02-29 NOTE — ED TRIAGE NOTES
Pt arrived via EMS from [facility]. Facility called for rt hand weakness that started last night. LKW at 10pm 02/28/24. Pt has generalized weakness hx. Pt denies any other complaints. . BP 98/70, sat 89% on RA, placed on 2L NC and increased 94%. Denies any pain. Pt in rehab for lt femur fracture. PNA dx prior.

## 2024-03-01 ENCOUNTER — APPOINTMENT (OUTPATIENT)
Dept: NON INVASIVE DIAGNOSTICS | Age: 74
DRG: 546 | End: 2024-03-01
Attending: INTERNAL MEDICINE
Payer: MEDICARE

## 2024-03-01 ENCOUNTER — APPOINTMENT (OUTPATIENT)
Dept: GENERAL RADIOLOGY | Age: 74
DRG: 546 | End: 2024-03-01
Payer: MEDICARE

## 2024-03-01 LAB
ANION GAP SERPL CALC-SCNC: 2 MMOL/L (ref 2–11)
BUN SERPL-MCNC: 9 MG/DL (ref 8–23)
CALCIUM SERPL-MCNC: 8.9 MG/DL (ref 8.3–10.4)
CHLORIDE SERPL-SCNC: 103 MMOL/L (ref 103–113)
CHOLEST SERPL-MCNC: 84 MG/DL
CO2 SERPL-SCNC: 33 MMOL/L (ref 21–32)
CREAT SERPL-MCNC: 0.5 MG/DL (ref 0.6–1)
ECHO AO ASC DIAM: 3.4 CM
ECHO AO ASCENDING AORTA INDEX: 1.32 CM/M2
ECHO AO ROOT DIAM: 3.6 CM
ECHO AO ROOT INDEX: 1.4 CM/M2
ECHO AV AREA PEAK VELOCITY: 2.9 CM2
ECHO AV AREA VTI: 3.3 CM2
ECHO AV AREA/BSA PEAK VELOCITY: 1.1 CM2/M2
ECHO AV AREA/BSA VTI: 1.3 CM2/M2
ECHO AV MEAN GRADIENT: 5 MMHG
ECHO AV MEAN VELOCITY: 1 M/S
ECHO AV PEAK GRADIENT: 10 MMHG
ECHO AV PEAK VELOCITY: 1.6 M/S
ECHO AV VELOCITY RATIO: 0.69
ECHO AV VTI: 26.3 CM
ECHO BSA: 2.74 M2
ECHO EST RA PRESSURE: 3 MMHG
ECHO IVC PROX: 1.3 CM
ECHO LV E' LATERAL VELOCITY: 8 CM/S
ECHO LV E' SEPTAL VELOCITY: 10 CM/S
ECHO LV EDV A2C: 162 ML
ECHO LV EDV A4C: 150 ML
ECHO LV EDV INDEX A4C: 58 ML/M2
ECHO LV EDV NDEX A2C: 63 ML/M2
ECHO LV EJECTION FRACTION A2C: 60 %
ECHO LV EJECTION FRACTION A4C: 64 %
ECHO LV EJECTION FRACTION BIPLANE: 60 % (ref 55–100)
ECHO LV ESV A2C: 64 ML
ECHO LV ESV A4C: 54 ML
ECHO LV ESV INDEX A2C: 25 ML/M2
ECHO LV ESV INDEX A4C: 21 ML/M2
ECHO LV FRACTIONAL SHORTENING: 31 % (ref 28–44)
ECHO LV INTERNAL DIMENSION DIASTOLE INDEX: 1.98 CM/M2
ECHO LV INTERNAL DIMENSION DIASTOLIC: 5.1 CM (ref 3.9–5.3)
ECHO LV INTERNAL DIMENSION SYSTOLIC INDEX: 1.36 CM/M2
ECHO LV INTERNAL DIMENSION SYSTOLIC: 3.5 CM
ECHO LV IVSD: 1 CM (ref 0.6–0.9)
ECHO LV MASS 2D: 200.8 G (ref 67–162)
ECHO LV MASS INDEX 2D: 77.8 G/M2 (ref 43–95)
ECHO LV POSTERIOR WALL DIASTOLIC: 1.1 CM (ref 0.6–0.9)
ECHO LV RELATIVE WALL THICKNESS RATIO: 0.43
ECHO LVOT AREA: 4.2 CM2
ECHO LVOT AV VTI INDEX: 0.8
ECHO LVOT DIAM: 2.3 CM
ECHO LVOT MEAN GRADIENT: 3 MMHG
ECHO LVOT PEAK GRADIENT: 5 MMHG
ECHO LVOT PEAK VELOCITY: 1.1 M/S
ECHO LVOT STROKE VOLUME INDEX: 33.8 ML/M2
ECHO LVOT SV: 87.2 ML
ECHO LVOT VTI: 21 CM
ECHO MV A VELOCITY: 1.04 M/S
ECHO MV E DECELERATION TIME (DT): 133 MS
ECHO MV E VELOCITY: 0.69 M/S
ECHO MV E/A RATIO: 0.66
ECHO MV E/E' LATERAL: 8.63
ECHO MV E/E' RATIO (AVERAGED): 7.76
ECHO PV ACCELERATION TIME (AT): 95 MS
ECHO PV MAX VELOCITY: 1.3 M/S
ECHO PV PEAK GRADIENT: 7 MMHG
ECHO RIGHT VENTRICULAR SYSTOLIC PRESSURE (RVSP): 26 MMHG
ECHO RV FREE WALL PEAK S': 18 CM/S
ECHO RV TAPSE: 2.4 CM (ref 1.7–?)
ECHO TV REGURGITANT MAX VELOCITY: 2.41 M/S
ECHO TV REGURGITANT PEAK GRADIENT: 23 MMHG
ERYTHROCYTE [DISTWIDTH] IN BLOOD BY AUTOMATED COUNT: 18 % (ref 11.9–14.6)
EST. AVERAGE GLUCOSE BLD GHB EST-MCNC: 105 MG/DL
GLUCOSE SERPL-MCNC: 88 MG/DL (ref 65–100)
HBA1C MFR BLD: 5.3 % (ref 4.8–5.6)
HCT VFR BLD AUTO: 28.9 % (ref 35.8–46.3)
HDLC SERPL-MCNC: 49 MG/DL (ref 40–60)
HDLC SERPL: 1.7
HGB BLD-MCNC: 8.9 G/DL (ref 11.7–15.4)
LDLC SERPL CALC-MCNC: 24.2 MG/DL
MAGNESIUM SERPL-MCNC: 2 MG/DL (ref 1.8–2.4)
MCH RBC QN AUTO: 27.9 PG (ref 26.1–32.9)
MCHC RBC AUTO-ENTMCNC: 30.8 G/DL (ref 31.4–35)
MCV RBC AUTO: 90.6 FL (ref 82–102)
NRBC # BLD: 0 K/UL (ref 0–0.2)
PLATELET # BLD AUTO: 435 K/UL (ref 150–450)
PMV BLD AUTO: 9 FL (ref 9.4–12.3)
POTASSIUM SERPL-SCNC: 3.3 MMOL/L (ref 3.5–5.1)
RBC # BLD AUTO: 3.19 M/UL (ref 4.05–5.2)
SODIUM SERPL-SCNC: 138 MMOL/L (ref 136–146)
TRIGL SERPL-MCNC: 54 MG/DL (ref 35–150)
VLDLC SERPL CALC-MCNC: 10.8 MG/DL (ref 6–23)
WBC # BLD AUTO: 14.4 K/UL (ref 4.3–11.1)

## 2024-03-01 PROCEDURE — C8929 TTE W OR WO FOL WCON,DOPPLER: HCPCS

## 2024-03-01 PROCEDURE — 97112 NEUROMUSCULAR REEDUCATION: CPT

## 2024-03-01 PROCEDURE — 73030 X-RAY EXAM OF SHOULDER: CPT

## 2024-03-01 PROCEDURE — 97166 OT EVAL MOD COMPLEX 45 MIN: CPT

## 2024-03-01 PROCEDURE — 6360000002 HC RX W HCPCS: Performed by: INTERNAL MEDICINE

## 2024-03-01 PROCEDURE — 97530 THERAPEUTIC ACTIVITIES: CPT

## 2024-03-01 PROCEDURE — 92610 EVALUATE SWALLOWING FUNCTION: CPT

## 2024-03-01 PROCEDURE — 6360000004 HC RX CONTRAST MEDICATION: Performed by: INTERNAL MEDICINE

## 2024-03-01 PROCEDURE — 80048 BASIC METABOLIC PNL TOTAL CA: CPT

## 2024-03-01 PROCEDURE — 80061 LIPID PANEL: CPT

## 2024-03-01 PROCEDURE — 6370000000 HC RX 637 (ALT 250 FOR IP): Performed by: EMERGENCY MEDICINE

## 2024-03-01 PROCEDURE — 6370000000 HC RX 637 (ALT 250 FOR IP): Performed by: INTERNAL MEDICINE

## 2024-03-01 PROCEDURE — 97162 PT EVAL MOD COMPLEX 30 MIN: CPT

## 2024-03-01 PROCEDURE — 85027 COMPLETE CBC AUTOMATED: CPT

## 2024-03-01 PROCEDURE — 83036 HEMOGLOBIN GLYCOSYLATED A1C: CPT

## 2024-03-01 PROCEDURE — 2580000003 HC RX 258: Performed by: INTERNAL MEDICINE

## 2024-03-01 PROCEDURE — 83735 ASSAY OF MAGNESIUM: CPT

## 2024-03-01 PROCEDURE — 36415 COLL VENOUS BLD VENIPUNCTURE: CPT

## 2024-03-01 PROCEDURE — 1100000003 HC PRIVATE W/ TELEMETRY

## 2024-03-01 RX ADMIN — PREGABALIN 75 MG: 75 CAPSULE ORAL at 20:03

## 2024-03-01 RX ADMIN — FUROSEMIDE 40 MG: 10 INJECTION, SOLUTION INTRAMUSCULAR; INTRAVENOUS at 08:33

## 2024-03-01 RX ADMIN — SODIUM CHLORIDE, PRESERVATIVE FREE 0.45 ML: 5 INJECTION INTRAVENOUS at 14:21

## 2024-03-01 RX ADMIN — ESCITALOPRAM OXALATE 10 MG: 10 TABLET ORAL at 20:03

## 2024-03-01 RX ADMIN — LORAZEPAM 2 MG: 1 TABLET ORAL at 20:03

## 2024-03-01 RX ADMIN — HYDROXYCHLOROQUINE SULFATE 200 MG: 200 TABLET ORAL at 20:03

## 2024-03-01 RX ADMIN — ATORVASTATIN CALCIUM 80 MG: 80 TABLET, FILM COATED ORAL at 20:03

## 2024-03-01 RX ADMIN — CLOPIDOGREL BISULFATE 75 MG: 75 TABLET ORAL at 08:33

## 2024-03-01 RX ADMIN — SODIUM CHLORIDE, PRESERVATIVE FREE 10 ML: 5 INJECTION INTRAVENOUS at 20:03

## 2024-03-01 RX ADMIN — ENOXAPARIN SODIUM 40 MG: 100 INJECTION SUBCUTANEOUS at 08:34

## 2024-03-01 RX ADMIN — Medication 3 MG: at 20:03

## 2024-03-01 RX ADMIN — SODIUM CHLORIDE, PRESERVATIVE FREE 10 ML: 5 INJECTION INTRAVENOUS at 08:35

## 2024-03-01 RX ADMIN — ASPIRIN 81 MG 81 MG: 81 TABLET ORAL at 08:33

## 2024-03-01 RX ADMIN — FOLIC ACID 1 MG: 1 TABLET ORAL at 08:33

## 2024-03-01 RX ADMIN — HYDROXYCHLOROQUINE SULFATE 200 MG: 200 TABLET ORAL at 08:34

## 2024-03-01 RX ADMIN — DOXEPIN HYDROCHLORIDE 150 MG: 50 CAPSULE ORAL at 20:03

## 2024-03-01 RX ADMIN — TAMSULOSIN HYDROCHLORIDE 0.4 MG: 0.4 CAPSULE ORAL at 08:33

## 2024-03-01 RX ADMIN — ENOXAPARIN SODIUM 40 MG: 100 INJECTION SUBCUTANEOUS at 20:04

## 2024-03-01 RX ADMIN — TRAZODONE HYDROCHLORIDE 300 MG: 50 TABLET ORAL at 20:03

## 2024-03-01 RX ADMIN — HYDROCODONE BITARTRATE AND ACETAMINOPHEN 1 TABLET: 5; 325 TABLET ORAL at 10:23

## 2024-03-01 RX ADMIN — FERROUS SULFATE TAB 325 MG (65 MG ELEMENTAL FE) 325 MG: 325 (65 FE) TAB at 08:34

## 2024-03-01 RX ADMIN — PREDNISONE 5 MG: 5 TABLET ORAL at 08:34

## 2024-03-01 RX ADMIN — PREGABALIN 75 MG: 75 CAPSULE ORAL at 08:34

## 2024-03-01 RX ADMIN — PANTOPRAZOLE SODIUM 40 MG: 40 TABLET, DELAYED RELEASE ORAL at 05:21

## 2024-03-01 ASSESSMENT — PAIN SCALES - GENERAL
PAINLEVEL_OUTOF10: 0
PAINLEVEL_OUTOF10: 4
PAINLEVEL_OUTOF10: 0

## 2024-03-01 ASSESSMENT — PAIN DESCRIPTION - LOCATION: LOCATION: GENERALIZED

## 2024-03-01 NOTE — PROGRESS NOTES
ACUTE PHYSICAL THERAPY GOALS:   (Developed with and agreed upon by patient and/or caregiver.)    LTG:  (1.)Ms. Chu will move from supine to sit and sit to supine , scoot up and down, and roll side to side in bed with MINIMAL ASSIST within 7 treatment day(s).    (2.)Ms. Chu will transfer from bed to chair and chair to bed with MAXIMAL ASSIST using sliding board  within 7 treatment day(s).    (3.)Ms. Chu will sit edge of bed performing LE ex's x15 minutes with MINIMAL ASSIST f within 7 treatment day(s).  ________________________________________________________________________________________________      PHYSICAL THERAPY Initial Assessment and Daily Note  (Link to Caseload Tracking: PT Visit Days : 1  Acknowledge Orders  Time In/Out  PT Charge Capture  Rehab Caseload Tracker    Ama Chu is a 73 y.o. female   PRIMARY DIAGNOSIS: Weakness of right upper extremity  Generalized weakness [R53.1]  Morbidly obese (HCC) [E66.01]  Weakness of right upper extremity [R29.898]  Muscle weakness of right upper extremity [M62.81]  Right sided weakness [R53.1]  Acute on chronic congestive heart failure, unspecified heart failure type (HCC) [I50.9]       Reason for Referral: Other abnormalities of gait and mobility (R26.89)  Inpatient: Payor: MEDICARE / Plan: MEDICARE PART A AND B / Product Type: *No Product type* /     ASSESSMENT:     REHAB RECOMMENDATIONS:   Recommendation to date pending progress:  Setting:  Short-term Rehab    Equipment:    None     ASSESSMENT:  Ms. Chu lives with her daughter.  She has a history of RA and previous R hip fracture so at baseline she uses a w/c and transfers via sliding board with CGA to min A.  On 1/24 patient suffered L hip fracture and has been in [facility] for most of February.  She was NWBing during part of that time but daughter states that after her appointment with Dr. Braga on Tuesday she is now WBAT.    Patient presents supine on bedpan.  She is alert and oriented

## 2024-03-01 NOTE — PROGRESS NOTES
END OF SHIFT NOTE:    INTAKE/OUTPUT  02/29 0701 - 03/01 0700  In: 120 [P.O.:120]  Out: 200 [Urine:200]  Voiding: Yes  Catheter: Yes  Drain:   External Urinary Catheter (Active)   Site Assessment Clean,dry & intact 03/01/24 0800   Urine Color Yellow 03/01/24 1555   Urine Appearance Clear 03/01/24 1555   Output (mL) 500 mL 03/01/24 1555               Flatus: Patient does have flatus present.    Stool:  0 occurrences.    Characteristics:                Emesis: 0 occurrences.    Characteristics:        VITAL SIGNS  Patient Vitals for the past 12 hrs:   Temp Pulse Resp BP SpO2   03/01/24 1555 98.1 °F (36.7 °C) 85 14 114/62 96 %   03/01/24 1205 97.7 °F (36.5 °C) 79 16 (!) 94/50 96 %   03/01/24 1053 -- -- 15 -- --   03/01/24 0743 98.2 °F (36.8 °C) 81 14 (!) 105/49 94 %       Pain Assessment  Pain Level: 0 (03/01/24 1053)  Pain Location: Generalized  Patient's Stated Pain Goal: 0 - No pain    Ambulating  No    Shift report given to oncoming nurse at the bedside.    Faby Benavidez RN

## 2024-03-01 NOTE — PROGRESS NOTES
Hospitalist Progress Note   Admit Date:  2024  9:20 AM   Name:  Ama Chu   Age:  73 y.o.  Sex:  female  :  1950   MRN:  [id number]   Room:      Presenting/Chief Complaint: Numbness     Reason(s) for Admission: Generalized weakness [R53.1]  Morbidly obese (HCC) [E66.01]  Weakness of right upper extremity [R29.898]  Muscle weakness of right upper extremity [M62.81]  Right sided weakness [R53.1]  Acute on chronic congestive heart failure, unspecified heart failure type (HCC) [I50.9]     Hospital Course:   As per prior documentation:  \"Ama Chu is a 73 y.o. female with medical history of morbid obesity, rheumatoid arthritis, neuropathy, hypertension, depression , recent left hip fracture s/p ORIF in 2024 who presented from SNF with acute onset of right upper extremities weakness.  Patient reports that she is in normal state of health until yesterday when she noted to have right upper extremity weakness.  Denies any numbness or tingling.  No other neurological symptoms.  Patient reports that she cannot lift her arm above shoulder.  Denies any pain.  Per daughter, patient has as needed torsemide but does not believe that she has been getting it.  In addition, patient was treated for pneumonia with Levaquin and has completed recently.     In the emergency room, patient hemodynamically stable but noted to have tachycardia and tachypnea.  Laboratory workup notable for hemoglobin of 8.9, proBNP of 642.  Chest x-ray with stable pulmonary edema.\"      Subjective & 24hr Events:   Patient seen and evaluated.  New patient for me today.  Admitted yesterday with right arm weakness and numbness  She now endorses pain  For stroke workup  MRI is pending      Assessment & Plan:     Principal Problem:    Weakness of right upper extremity  MRI of the brain pending  X-ray of the shoulder secondary to pain  Pain meds  Muscle relaxants      Active Problems:    Rheumatoid arthritis (HCC)  Noted

## 2024-03-01 NOTE — CARE COORDINATION
CM spoke to patient at bedside on this day. Patient confirmed demographic information. Patient reports that she was admitted from [facility] and would like to return there once medically stable for discharge. CM sent referral accordingly. Patient stated that she is independent with ADLs, uses a rolling walker and wheelchair, and does not drive. Patient reports that she has no home care services. Patient confirmed PCP information and last PCP visit was 2 months ago.     No CM needs voiced or noted. CM will continue to follow patient for any discharge planning needs.        03/01/24 1127   Service Assessment   Patient Orientation Alert and Oriented   Cognition Alert   History Provided By Patient;Child/Family   Primary Caregiver Self   Accompanied By/Relationship patient's daughter at bedside at time of assessment   Support Systems Children   Patient's Healthcare Decision Maker is: Legal Next of Kin   PCP Verified by CM Yes  (confirmed PCP is Dr. Jose Miguel Jimenez)   Last Visit to PCP Within last 3 months  (last PCP visit was 2 months ago)   Prior Functional Level Independent in ADLs/IADLs   Current Functional Level Other (see comment)  (TBD by clinicals)   Can patient return to prior living arrangement Yes   Ability to make needs known: Good   Family able to assist with home care needs: Yes   Would you like for me to discuss the discharge plan with any other family members/significant others, and if so, who? Yes   Financial Resources Medicare;Other (Comment)  (Medicare Part A and B and BCBS)   Community Resources None   CM/SW Referral Other (see comment)  (discharge planning)   Social/Functional History   Lives With Daughter   Type of Home House   Home Layout Multi-level  (House has 3 stories)   Home Access Level entry   Bathroom Shower/Tub Walk-in shower   Bathroom Toilet Standard   Bathroom Equipment None   Bathroom Accessibility Accessible   Home Equipment Walker, rolling;Wheelchair-manual   Receives Help From

## 2024-03-01 NOTE — PROGRESS NOTES
SPEECH LANGUAGE PATHOLOGY: DYSPHAGIA Initial Assessment    Acknowledge Order  I  Therapy Time  I   Charges     I  Rehab Caseload Tracker      NAME: Ama Chu  : 1950  MRN: [id number]    ADMISSION DATE: 2024  PRIMARY DIAGNOSIS: Weakness of right upper extremity    ICD-10: Treatment Diagnosis: R13.12 Dysphagia, Oropharyngeal Phase    RECOMMENDATIONS   Diet:    Easy to Chew- okay for bread and mixed consistencies   Thin Liquids    Medication: as tolerated   Compensatory Swallowing Strategies:   Upright as possible for all oral intake   Therapeutic Intervention:   No additional speech therapy intervention indicated at this time.    Patient continues to require skilled intervention:  To be determined. Possible further evaluation pending clinical course/imaging.     Anticipated Discharge Needs: Do not anticipate ongoing speech therapy needs upon discharge.      ASSESSMENT    Patient's swallow WFL this date as can be assessed at bedside. Consumed approximately 2-4 oz of thin liquid via straw. Labial rounding and siphoning functional, no indications of airway compromise as can be assessed at bedside. Patient/daughter deny any coughing on PO. Patient deferred regular solid trials but accepted soft solids. Patient with large bolus size, mildly prolonged oral phase with complete mastication and clearance, no residue noted post-swallow.     Discussed diet options with patient/daughter who opted for easy to chew diet due to lack of dentition. Patient reports self restricting difficult to masticate foods but restrictions are very limited.     Recommend easy to chew solids/thin liquids, meds as tolerated. Allow bread and mixed consistencies. ST will follow up for further cognitive linguistic assessment pending results of stroke work up.    GENERAL    Subjective: RN cleared patient to participate, reported no concerns with diet tolerance. Patient agreeable, daughter at bedside. Both deny acute changes to patient's

## 2024-03-01 NOTE — PROGRESS NOTES
ACUTE OCCUPATIONAL THERAPY GOALS:   (Developed with and agreed upon by patient and/or caregiver.)  1. Pt will complete toileting mod A with AE as needed including bed pan.   2. Pt will tolerate 25 minutes of OT treatment requiring 1-2 breaks as needed.   3. Pt will complete grooming/feeding tasks while sitting EOB supervision/set up.  4. Pt will complete functional transfers mod A x2.  5. Pt will complete lateral scooting with Mod A.  6. Pt will tolerate BUE exercises (with new R shoulder weakness in mind) to increase strength for safe, functional transfers and/or functional mobility, and ADL participation.     Timeframe: 7 days      OCCUPATIONAL THERAPY Initial Assessment, Daily Note, and AM       OT Visit Days: 1  Acknowledge Orders  Time  OT Charge Capture  Rehab Caseload Tracker      Ama Chu is a 73 y.o. female   PRIMARY DIAGNOSIS: Weakness of right upper extremity  Generalized weakness [R53.1]  Morbidly obese (HCC) [E66.01]  Weakness of right upper extremity [R29.898]  Muscle weakness of right upper extremity [M62.81]  Right sided weakness [R53.1]  Acute on chronic congestive heart failure, unspecified heart failure type (HCC) [I50.9]       Reason for Referral: Generalized Muscle Weakness (M62.81)  Inpatient: Payor: MEDICARE / Plan: MEDICARE PART A AND B / Product Type: *No Product type* /     ASSESSMENT:     REHAB RECOMMENDATIONS:   Recommendation to date pending progress:  Setting:  Short-term Rehab possible transition to LTC    Equipment:    To Be Determined     ASSESSMENT:  Ms. Chu is a 73 y F with a hx of generalized weakness, morbid obesity. Pt came [facility]. Last hospitalization was for L femur fracture with nail fixation. Prior to fracture, pt's baseline was modified independent via sliding board and WC. During last hospitalization pt was WBAT transfers only completing bed to chair SBT. During rehab stay, pt was changed to NWB LLE due to pain. As of last week, pt then became WBAT again. Pt

## 2024-03-01 NOTE — CARE COORDINATION
SHARON spoke to George from [facility] who stated patient can return to [facility] over the weekend.     George asked weekend SHARON to please fax d/c summary ASAP to fax number: [phone].

## 2024-03-01 NOTE — PLAN OF CARE
Problem: Discharge Planning  Goal: Discharge to home or other facility with appropriate resources  Outcome: Progressing     Problem: Pain  Goal: Verbalizes/displays adequate comfort level or baseline comfort level  Outcome: Progressing     Problem: ABCDS Injury Assessment  Goal: Absence of physical injury  Outcome: Progressing     Problem: Skin/Tissue Integrity  Goal: Absence of new skin breakdown  Description: 1.  Monitor for areas of redness and/or skin breakdown  2.  Assess vascular access sites hourly  3.  Every 4-6 hours minimum:  Change oxygen saturation probe site  4.  Every 4-6 hours:  If on nasal continuous positive airway pressure, respiratory therapy assess nares and determine need for appliance change or resting period.  Outcome: Progressing     Problem: Safety - Adult  Goal: Free from fall injury  Outcome: Progressing

## 2024-03-01 NOTE — PROGRESS NOTES
This RN spoke with PT. Patient is painful when raising R arm up above her head. PT is wondering if she could have injured it while at the rehab facility. Messaged hospitalist to notify her. Orders placed for XR Right shoulder 2 view.

## 2024-03-02 ENCOUNTER — APPOINTMENT (OUTPATIENT)
Dept: GENERAL RADIOLOGY | Age: 74
DRG: 546 | End: 2024-03-02
Payer: MEDICARE

## 2024-03-02 ENCOUNTER — APPOINTMENT (OUTPATIENT)
Dept: MRI IMAGING | Age: 74
DRG: 546 | End: 2024-03-02
Payer: MEDICARE

## 2024-03-02 LAB
ANION GAP SERPL CALC-SCNC: 2 MMOL/L (ref 2–11)
BUN SERPL-MCNC: 10 MG/DL (ref 8–23)
CALCIUM SERPL-MCNC: 9.1 MG/DL (ref 8.3–10.4)
CHLORIDE SERPL-SCNC: 104 MMOL/L (ref 103–113)
CO2 SERPL-SCNC: 33 MMOL/L (ref 21–32)
CREAT SERPL-MCNC: 0.6 MG/DL (ref 0.6–1)
ERYTHROCYTE [DISTWIDTH] IN BLOOD BY AUTOMATED COUNT: 18.2 % (ref 11.9–14.6)
GLUCOSE SERPL-MCNC: 93 MG/DL (ref 65–100)
HCT VFR BLD AUTO: 29.7 % (ref 35.8–46.3)
HGB BLD-MCNC: 8.9 G/DL (ref 11.7–15.4)
MAGNESIUM SERPL-MCNC: 2 MG/DL (ref 1.8–2.4)
MCH RBC QN AUTO: 27.5 PG (ref 26.1–32.9)
MCHC RBC AUTO-ENTMCNC: 30 G/DL (ref 31.4–35)
MCV RBC AUTO: 91.7 FL (ref 82–102)
NRBC # BLD: 0 K/UL (ref 0–0.2)
PLATELET # BLD AUTO: 441 K/UL (ref 150–450)
PMV BLD AUTO: 9.1 FL (ref 9.4–12.3)
POTASSIUM SERPL-SCNC: 3.3 MMOL/L (ref 3.5–5.1)
RBC # BLD AUTO: 3.24 M/UL (ref 4.05–5.2)
SODIUM SERPL-SCNC: 139 MMOL/L (ref 136–146)
WBC # BLD AUTO: 12.3 K/UL (ref 4.3–11.1)

## 2024-03-02 PROCEDURE — 36415 COLL VENOUS BLD VENIPUNCTURE: CPT

## 2024-03-02 PROCEDURE — 83735 ASSAY OF MAGNESIUM: CPT

## 2024-03-02 PROCEDURE — 85027 COMPLETE CBC AUTOMATED: CPT

## 2024-03-02 PROCEDURE — 2580000003 HC RX 258: Performed by: INTERNAL MEDICINE

## 2024-03-02 PROCEDURE — 1100000003 HC PRIVATE W/ TELEMETRY

## 2024-03-02 PROCEDURE — A4216 STERILE WATER/SALINE, 10 ML: HCPCS | Performed by: INTERNAL MEDICINE

## 2024-03-02 PROCEDURE — 6370000000 HC RX 637 (ALT 250 FOR IP): Performed by: EMERGENCY MEDICINE

## 2024-03-02 PROCEDURE — 72040 X-RAY EXAM NECK SPINE 2-3 VW: CPT

## 2024-03-02 PROCEDURE — 6370000000 HC RX 637 (ALT 250 FOR IP): Performed by: INTERNAL MEDICINE

## 2024-03-02 PROCEDURE — 6360000002 HC RX W HCPCS: Performed by: INTERNAL MEDICINE

## 2024-03-02 PROCEDURE — 80048 BASIC METABOLIC PNL TOTAL CA: CPT

## 2024-03-02 PROCEDURE — 70551 MRI BRAIN STEM W/O DYE: CPT

## 2024-03-02 RX ORDER — CYCLOBENZAPRINE HCL 10 MG
10 TABLET ORAL 3 TIMES DAILY PRN
Status: DISCONTINUED | OUTPATIENT
Start: 2024-03-02 | End: 2024-03-03 | Stop reason: HOSPADM

## 2024-03-02 RX ADMIN — HYDROCODONE BITARTRATE AND ACETAMINOPHEN 1 TABLET: 5; 325 TABLET ORAL at 09:22

## 2024-03-02 RX ADMIN — PANTOPRAZOLE SODIUM 40 MG: 40 TABLET, DELAYED RELEASE ORAL at 05:48

## 2024-03-02 RX ADMIN — HYDROXYCHLOROQUINE SULFATE 200 MG: 200 TABLET ORAL at 09:25

## 2024-03-02 RX ADMIN — SODIUM CHLORIDE 2 MG: 9 INJECTION INTRAMUSCULAR; INTRAVENOUS; SUBCUTANEOUS at 11:51

## 2024-03-02 RX ADMIN — CLOPIDOGREL BISULFATE 75 MG: 75 TABLET ORAL at 09:24

## 2024-03-02 RX ADMIN — ESCITALOPRAM OXALATE 10 MG: 10 TABLET ORAL at 23:22

## 2024-03-02 RX ADMIN — TAMSULOSIN HYDROCHLORIDE 0.4 MG: 0.4 CAPSULE ORAL at 09:25

## 2024-03-02 RX ADMIN — DOXEPIN HYDROCHLORIDE 150 MG: 50 CAPSULE ORAL at 23:12

## 2024-03-02 RX ADMIN — FERROUS SULFATE TAB 325 MG (65 MG ELEMENTAL FE) 325 MG: 325 (65 FE) TAB at 09:25

## 2024-03-02 RX ADMIN — FUROSEMIDE 40 MG: 10 INJECTION, SOLUTION INTRAMUSCULAR; INTRAVENOUS at 09:26

## 2024-03-02 RX ADMIN — FOLIC ACID 1 MG: 1 TABLET ORAL at 09:24

## 2024-03-02 RX ADMIN — SODIUM CHLORIDE, PRESERVATIVE FREE 10 ML: 5 INJECTION INTRAVENOUS at 22:36

## 2024-03-02 RX ADMIN — LORAZEPAM 2 MG: 1 TABLET ORAL at 23:17

## 2024-03-02 RX ADMIN — METOPROLOL SUCCINATE 50 MG: 50 TABLET, EXTENDED RELEASE ORAL at 09:23

## 2024-03-02 RX ADMIN — HYDROXYCHLOROQUINE SULFATE 200 MG: 200 TABLET ORAL at 22:56

## 2024-03-02 RX ADMIN — ASPIRIN 81 MG 81 MG: 81 TABLET ORAL at 09:25

## 2024-03-02 RX ADMIN — Medication 3 MG: at 23:17

## 2024-03-02 RX ADMIN — PREGABALIN 75 MG: 75 CAPSULE ORAL at 23:18

## 2024-03-02 RX ADMIN — ENOXAPARIN SODIUM 40 MG: 100 INJECTION SUBCUTANEOUS at 09:26

## 2024-03-02 RX ADMIN — SODIUM CHLORIDE 10 ML: 9 INJECTION INTRAMUSCULAR; INTRAVENOUS; SUBCUTANEOUS at 09:26

## 2024-03-02 RX ADMIN — ATORVASTATIN CALCIUM 80 MG: 80 TABLET, FILM COATED ORAL at 22:56

## 2024-03-02 RX ADMIN — TRAZODONE HYDROCHLORIDE 300 MG: 50 TABLET ORAL at 23:16

## 2024-03-02 RX ADMIN — ENOXAPARIN SODIUM 40 MG: 100 INJECTION SUBCUTANEOUS at 22:35

## 2024-03-02 RX ADMIN — SODIUM CHLORIDE, PRESERVATIVE FREE 10 ML: 5 INJECTION INTRAVENOUS at 09:27

## 2024-03-02 RX ADMIN — PREDNISONE 5 MG: 5 TABLET ORAL at 09:24

## 2024-03-02 RX ADMIN — PREGABALIN 75 MG: 75 CAPSULE ORAL at 09:24

## 2024-03-02 ASSESSMENT — PAIN - FUNCTIONAL ASSESSMENT: PAIN_FUNCTIONAL_ASSESSMENT: PREVENTS OR INTERFERES SOME ACTIVE ACTIVITIES AND ADLS

## 2024-03-02 ASSESSMENT — PAIN DESCRIPTION - LOCATION: LOCATION: LEG

## 2024-03-02 ASSESSMENT — PAIN SCALES - GENERAL
PAINLEVEL_OUTOF10: 1
PAINLEVEL_OUTOF10: 4

## 2024-03-02 ASSESSMENT — PAIN DESCRIPTION - DESCRIPTORS: DESCRIPTORS: ACHING

## 2024-03-02 ASSESSMENT — PAIN DESCRIPTION - ORIENTATION: ORIENTATION: LEFT

## 2024-03-02 NOTE — PROGRESS NOTES
Hospitalist Progress Note   Admit Date:  2024  9:20 AM   Name:  Ama Chu   Age:  73 y.o.  Sex:  female  :  1950   MRN:  [id number]   Room:      Presenting/Chief Complaint: Numbness     Reason(s) for Admission: Generalized weakness [R53.1]  Morbidly obese (HCC) [E66.01]  Weakness of right upper extremity [R29.898]  Muscle weakness of right upper extremity [M62.81]  Right sided weakness [R53.1]  Acute on chronic congestive heart failure, unspecified heart failure type (HCC) [I50.9]     Hospital Course:   As per prior documentation:  \"Ama Chu is a 73 y.o. female with medical history of morbid obesity, rheumatoid arthritis, neuropathy, hypertension, depression , recent left hip fracture s/p ORIF in 2024 who presented from SNF with acute onset of right upper extremities weakness.  Patient reports that she is in normal state of health until yesterday when she noted to have right upper extremity weakness.  Denies any numbness or tingling.  No other neurological symptoms.  Patient reports that she cannot lift her arm above shoulder.  Denies any pain.  Per daughter, patient has as needed torsemide but does not believe that she has been getting it.  In addition, patient was treated for pneumonia with Levaquin and has completed recently.     In the emergency room, patient hemodynamically stable but noted to have tachycardia and tachypnea.  Laboratory workup notable for hemoglobin of 8.9, proBNP of 642.  Chest x-ray with stable pulmonary edema.\"      Subjective & 24hr Events:   Patient seen and evaluated.  Admitted yesterday with right arm weakness and numbness -symptoms have resolved  Noted rt shoulder pain however - not new and chronic  The inability to use the rt arm was new.   She does not recall any injury or pain at rehab   Cva workup was negative    Assessment & Plan:     Principal Problem:    Weakness of right upper extremity  -resolved  - stroke workup was negative  MRI of the brain

## 2024-03-03 VITALS
HEART RATE: 84 BPM | WEIGHT: 293 LBS | BODY MASS INDEX: 44.41 KG/M2 | OXYGEN SATURATION: 95 % | TEMPERATURE: 97.7 F | SYSTOLIC BLOOD PRESSURE: 118 MMHG | DIASTOLIC BLOOD PRESSURE: 59 MMHG | HEIGHT: 68 IN | RESPIRATION RATE: 18 BRPM

## 2024-03-03 LAB
ANION GAP SERPL CALC-SCNC: 6 MMOL/L (ref 2–11)
BUN SERPL-MCNC: 11 MG/DL (ref 8–23)
CALCIUM SERPL-MCNC: 9.1 MG/DL (ref 8.3–10.4)
CHLORIDE SERPL-SCNC: 103 MMOL/L (ref 103–113)
CO2 SERPL-SCNC: 32 MMOL/L (ref 21–32)
CREAT SERPL-MCNC: 0.6 MG/DL (ref 0.6–1)
ERYTHROCYTE [DISTWIDTH] IN BLOOD BY AUTOMATED COUNT: 17.7 % (ref 11.9–14.6)
GLUCOSE SERPL-MCNC: 94 MG/DL (ref 65–100)
HCT VFR BLD AUTO: 30.5 % (ref 35.8–46.3)
HGB BLD-MCNC: 9 G/DL (ref 11.7–15.4)
MAGNESIUM SERPL-MCNC: 2.1 MG/DL (ref 1.8–2.4)
MCH RBC QN AUTO: 27.3 PG (ref 26.1–32.9)
MCHC RBC AUTO-ENTMCNC: 29.5 G/DL (ref 31.4–35)
MCV RBC AUTO: 92.4 FL (ref 82–102)
NRBC # BLD: 0 K/UL (ref 0–0.2)
PLATELET # BLD AUTO: 451 K/UL (ref 150–450)
PMV BLD AUTO: 9.3 FL (ref 9.4–12.3)
POTASSIUM SERPL-SCNC: 3.2 MMOL/L (ref 3.5–5.1)
RBC # BLD AUTO: 3.3 M/UL (ref 4.05–5.2)
SODIUM SERPL-SCNC: 141 MMOL/L (ref 136–146)
WBC # BLD AUTO: 12.1 K/UL (ref 4.3–11.1)

## 2024-03-03 PROCEDURE — 85027 COMPLETE CBC AUTOMATED: CPT

## 2024-03-03 PROCEDURE — 80048 BASIC METABOLIC PNL TOTAL CA: CPT

## 2024-03-03 PROCEDURE — 83735 ASSAY OF MAGNESIUM: CPT

## 2024-03-03 PROCEDURE — 2580000003 HC RX 258: Performed by: INTERNAL MEDICINE

## 2024-03-03 PROCEDURE — 36415 COLL VENOUS BLD VENIPUNCTURE: CPT

## 2024-03-03 PROCEDURE — 6370000000 HC RX 637 (ALT 250 FOR IP): Performed by: INTERNAL MEDICINE

## 2024-03-03 PROCEDURE — 6370000000 HC RX 637 (ALT 250 FOR IP): Performed by: EMERGENCY MEDICINE

## 2024-03-03 PROCEDURE — 6360000002 HC RX W HCPCS: Performed by: INTERNAL MEDICINE

## 2024-03-03 RX ORDER — CYCLOBENZAPRINE HCL 10 MG
10 TABLET ORAL 3 TIMES DAILY PRN
Qty: 30 TABLET | Refills: 0 | Status: SHIPPED | OUTPATIENT
Start: 2024-03-03 | End: 2024-03-13

## 2024-03-03 RX ORDER — ONDANSETRON 4 MG/1
4 TABLET, ORALLY DISINTEGRATING ORAL EVERY 8 HOURS PRN
Qty: 30 TABLET | Refills: 0
Start: 2024-03-03

## 2024-03-03 RX ORDER — ASPIRIN 81 MG/1
81 TABLET, CHEWABLE ORAL DAILY
Qty: 17 TABLET | Refills: 0
Start: 2024-03-04 | End: 2024-03-21

## 2024-03-03 RX ORDER — NICOTINE 21 MG/24HR
1 PATCH, TRANSDERMAL 24 HOURS TRANSDERMAL DAILY PRN
Qty: 30 PATCH | Refills: 3
Start: 2024-03-03

## 2024-03-03 RX ORDER — CLOPIDOGREL BISULFATE 75 MG/1
75 TABLET ORAL DAILY
Qty: 17 TABLET | Refills: 0
Start: 2024-03-04 | End: 2024-03-21

## 2024-03-03 RX ORDER — HYDROCODONE BITARTRATE AND ACETAMINOPHEN 5; 325 MG/1; MG/1
1 TABLET ORAL 2 TIMES DAILY PRN
Qty: 6 TABLET | Refills: 0 | Status: SHIPPED | OUTPATIENT
Start: 2024-03-03 | End: 2024-03-06

## 2024-03-03 RX ADMIN — FUROSEMIDE 40 MG: 10 INJECTION, SOLUTION INTRAMUSCULAR; INTRAVENOUS at 08:53

## 2024-03-03 RX ADMIN — HYDROCODONE BITARTRATE AND ACETAMINOPHEN 1 TABLET: 5; 325 TABLET ORAL at 13:25

## 2024-03-03 RX ADMIN — FOLIC ACID 1 MG: 1 TABLET ORAL at 08:52

## 2024-03-03 RX ADMIN — ENOXAPARIN SODIUM 40 MG: 100 INJECTION SUBCUTANEOUS at 08:53

## 2024-03-03 RX ADMIN — PREDNISONE 5 MG: 5 TABLET ORAL at 08:52

## 2024-03-03 RX ADMIN — CLOPIDOGREL BISULFATE 75 MG: 75 TABLET ORAL at 08:52

## 2024-03-03 RX ADMIN — ASPIRIN 81 MG 81 MG: 81 TABLET ORAL at 08:52

## 2024-03-03 RX ADMIN — METOPROLOL SUCCINATE 50 MG: 50 TABLET, EXTENDED RELEASE ORAL at 08:53

## 2024-03-03 RX ADMIN — FERROUS SULFATE TAB 325 MG (65 MG ELEMENTAL FE) 325 MG: 325 (65 FE) TAB at 08:52

## 2024-03-03 RX ADMIN — SODIUM CHLORIDE, PRESERVATIVE FREE 10 ML: 5 INJECTION INTRAVENOUS at 08:54

## 2024-03-03 RX ADMIN — HYDROXYCHLOROQUINE SULFATE 200 MG: 200 TABLET ORAL at 08:52

## 2024-03-03 RX ADMIN — TAMSULOSIN HYDROCHLORIDE 0.4 MG: 0.4 CAPSULE ORAL at 08:52

## 2024-03-03 RX ADMIN — PREGABALIN 75 MG: 75 CAPSULE ORAL at 08:53

## 2024-03-03 RX ADMIN — PANTOPRAZOLE SODIUM 40 MG: 40 TABLET, DELAYED RELEASE ORAL at 06:07

## 2024-03-03 ASSESSMENT — PAIN - FUNCTIONAL ASSESSMENT: PAIN_FUNCTIONAL_ASSESSMENT: PREVENTS OR INTERFERES SOME ACTIVE ACTIVITIES AND ADLS

## 2024-03-03 ASSESSMENT — PAIN DESCRIPTION - LOCATION: LOCATION: KNEE

## 2024-03-03 ASSESSMENT — PAIN SCALES - GENERAL: PAINLEVEL_OUTOF10: 5

## 2024-03-03 ASSESSMENT — PAIN DESCRIPTION - ORIENTATION: ORIENTATION: LEFT

## 2024-03-03 ASSESSMENT — PAIN DESCRIPTION - DESCRIPTORS: DESCRIPTORS: ACHING

## 2024-03-03 NOTE — DISCHARGE SUMMARY
Details   aspirin 81 MG chewable tablet Take 1 tablet by mouth daily for 17 days  Qty: 17 tablet, Refills: 0      !! ondansetron (ZOFRAN-ODT) 4 MG disintegrating tablet Take 1 tablet by mouth every 8 hours as needed for Nausea or Vomiting  Qty: 30 tablet, Refills: 0      clopidogrel (PLAVIX) 75 MG tablet Take 1 tablet by mouth daily for 17 days  Qty: 17 tablet, Refills: 0      cyclobenzaprine (FLEXERIL) 10 MG tablet Take 1 tablet by mouth 3 times daily as needed for Muscle spasms (shoulder pain)  Qty: 30 tablet, Refills: 0      nicotine (NICODERM CQ) 14 MG/24HR Place 1 patch onto the skin daily as needed (smoking cravings)  Qty: 30 patch, Refills: 3       !! - Potential duplicate medications found. Please discuss with provider.        CONTINUE these medications which have CHANGED    Details   HYDROcodone-acetaminophen (NORCO) 5-325 MG per tablet Take 1 tablet by mouth 2 times daily as needed for Pain for up to 3 days. Max Daily Amount: 2 tablets  Qty: 6 tablet, Refills: 0    Comments: Reduce doses taken as pain becomes manageable  Associated Diagnoses: Closed fracture of left femur with routine healing, unspecified fracture morphology, unspecified portion of femur, subsequent encounter           CONTINUE these medications which have NOT CHANGED    Details   celecoxib (CELEBREX) 100 MG capsule Take 1 capsule by mouth daily      polyethylene glycol (MIRALAX) 17 g PACK packet Take 17 g by mouth daily      sennosides-docusate sodium (SENOKOT-S) 8.6-50 MG tablet Take 2 tablets by mouth daily      !! ondansetron (ZOFRAN-ODT) 4 MG disintegrating tablet Take 1 tablet by mouth every 6 hours as needed for Nausea or Vomiting      pantoprazole (PROTONIX) 40 MG tablet Take 1 tablet by mouth daily  Qty: 30 tablet, Refills: 3      torsemide (DEMADEX) 100 MG tablet Take 0.5 tablets by mouth at bedtime  Qty: 30 tablet, Refills: 0      doxepin (SINEQUAN) 100 MG capsule Take 150 mg by mouth nightly      ergocalciferol (ERGOCALCIFEROL)

## 2024-03-03 NOTE — PROGRESS NOTES
Report called to Gisell at the [facility].  [facility] arrived to transport patient.  All lines D/c'ed.  Pain medication provided to patient prior to transport.  Prescriptions and discharge instructions given to family.  All belongings taken by family.

## 2024-03-03 NOTE — CARE COORDINATION
Pt is for discharge today to [facility] at [facility], Room# 323 as planned. Report is [phone].  Transport via [facility] around 1:30 pm.  Packet prepared to go with pt to facility.  Spoke with Migdalia Rick by phone ([phone]) with update on anticipated transport time.         03/01/24 1129   Service Assessment   Patient Orientation Alert and Oriented   Cognition Alert   History Provided By Patient;Child/Family   Primary Caregiver Self   Accompanied By/Relationship patient's daughter at bedside at time of assessment   Support Systems Children   Patient's Healthcare Decision Maker is: Legal Next of Kin   PCP Verified by CM Yes  (confirmed PCP is Dr. Jose Miguel Jimenez)   Last Visit to PCP Within last 3 months  (last PCP visit was 2 months ago)   Prior Functional Level Independent in ADLs/IADLs   Current Functional Level Other (see comment)  (TBD by clinicals)   Can patient return to prior living arrangement Yes   Ability to make needs known: Good   Family able to assist with home care needs: Yes   Would you like for me to discuss the discharge plan with any other family members/significant others, and if so, who? Yes   Financial Resources Medicare;Other (Comment)  (Medicare Part A and B and BCBS)   Community Resources None   CM/SW Referral Other (see comment)  (discharge planning)   Social/Functional History   Lives With Daughter   Type of Home House   Home Layout Multi-level  (House has 3 stories)   Home Access Level entry   Bathroom Shower/Tub Walk-in shower   Bathroom Toilet Standard   Bathroom Equipment None   Bathroom Accessibility Accessible   Home Equipment Walker, rolling;Wheelchair-manual   Receives Help From Family   ADL Assistance Independent   Homemaking Assistance Independent   Homemaking Responsibilities Yes   Ambulation Assistance Independent   Transfer Assistance Independent   Active  No   Patient's  Info patient's daughter   Occupation Retired   Discharge Planning   Type of Residence

## 2024-03-21 ENCOUNTER — APPOINTMENT (OUTPATIENT)
Dept: GENERAL RADIOLOGY | Age: 74
DRG: 871 | End: 2024-03-21
Payer: MEDICARE

## 2024-03-21 ENCOUNTER — HOSPITAL ENCOUNTER (EMERGENCY)
Age: 74
Discharge: HOME | DRG: 871 | End: 2024-03-21
Attending: EMERGENCY MEDICINE
Payer: MEDICARE

## 2024-03-21 VITALS
HEIGHT: 68 IN | TEMPERATURE: 98 F | OXYGEN SATURATION: 96 % | SYSTOLIC BLOOD PRESSURE: 113 MMHG | WEIGHT: 293 LBS | HEART RATE: 87 BPM | RESPIRATION RATE: 15 BRPM | DIASTOLIC BLOOD PRESSURE: 61 MMHG | BODY MASS INDEX: 44.41 KG/M2

## 2024-03-21 DIAGNOSIS — J18.9 PNEUMONIA OF BOTH LOWER LOBES DUE TO INFECTIOUS ORGANISM: ICD-10-CM

## 2024-03-21 DIAGNOSIS — J96.01 ACUTE RESPIRATORY FAILURE WITH HYPOXIA (HCC): Primary | ICD-10-CM

## 2024-03-21 PROBLEM — M46.46 DISCITIS OF LUMBAR REGION: Chronic | Status: ACTIVE | Noted: 2021-07-21

## 2024-03-21 PROBLEM — G47.33 OSA (OBSTRUCTIVE SLEEP APNEA): Status: ACTIVE | Noted: 2023-06-28

## 2024-03-21 PROBLEM — M72.2 PLANTAR FASCIITIS, LEFT: Status: ACTIVE | Noted: 2020-02-24

## 2024-03-21 PROBLEM — J96.02 ACUTE RESPIRATORY FAILURE WITH HYPERCAPNIA (HCC): Status: ACTIVE | Noted: 2023-06-28

## 2024-03-21 PROBLEM — M46.26 ACUTE OSTEOMYELITIS OF LUMBAR SPINE (HCC): Chronic | Status: ACTIVE | Noted: 2021-07-21

## 2024-03-21 PROBLEM — I50.32 CHRONIC DIASTOLIC HEART FAILURE (HCC): Chronic | Status: ACTIVE | Noted: 2018-04-04

## 2024-03-21 PROBLEM — F41.9 ANXIETY: Chronic | Status: ACTIVE | Noted: 2021-10-06

## 2024-03-21 LAB
ALBUMIN SERPL-MCNC: 2.1 G/DL (ref 3.2–4.6)
ALBUMIN/GLOB SERPL: 0.4 (ref 0.4–1.6)
ALP SERPL-CCNC: 75 U/L (ref 50–136)
ALT SERPL-CCNC: 11 U/L (ref 12–65)
ANION GAP SERPL CALC-SCNC: 2 MMOL/L (ref 2–11)
AST SERPL-CCNC: 16 U/L (ref 15–37)
BASOPHILS # BLD: 0 K/UL (ref 0–0.2)
BASOPHILS NFR BLD: 0 % (ref 0–2)
BILIRUB SERPL-MCNC: 0.6 MG/DL (ref 0.2–1.1)
BUN SERPL-MCNC: 18 MG/DL (ref 8–23)
CALCIUM SERPL-MCNC: 9.3 MG/DL (ref 8.3–10.4)
CHLORIDE SERPL-SCNC: 98 MMOL/L (ref 103–113)
CO2 SERPL-SCNC: 38 MMOL/L (ref 21–32)
CREAT SERPL-MCNC: 1 MG/DL (ref 0.6–1)
DIFFERENTIAL METHOD BLD: ABNORMAL
EKG ATRIAL RATE: 91 BPM
EKG DIAGNOSIS: NORMAL
EKG P AXIS: 32 DEGREES
EKG P-R INTERVAL: 180 MS
EKG Q-T INTERVAL: 421 MS
EKG QRS DURATION: 98 MS
EKG QTC CALCULATION (BAZETT): 513 MS
EKG R AXIS: -23 DEGREES
EKG T AXIS: -4 DEGREES
EKG VENTRICULAR RATE: 89 BPM
EOSINOPHIL # BLD: 0 K/UL (ref 0–0.8)
EOSINOPHIL NFR BLD: 0 % (ref 0.5–7.8)
ERYTHROCYTE [DISTWIDTH] IN BLOOD BY AUTOMATED COUNT: 18.9 % (ref 11.9–14.6)
FLUAV RNA SPEC QL NAA+PROBE: NOT DETECTED
FLUBV RNA SPEC QL NAA+PROBE: NOT DETECTED
GLOBULIN SER CALC-MCNC: 5.4 G/DL (ref 2.8–4.5)
GLUCOSE SERPL-MCNC: 118 MG/DL (ref 65–100)
HCT VFR BLD AUTO: 29.3 % (ref 35.8–46.3)
HGB BLD-MCNC: 8.7 G/DL (ref 11.7–15.4)
IMM GRANULOCYTES # BLD AUTO: 0 K/UL (ref 0–0.5)
IMM GRANULOCYTES NFR BLD AUTO: 1 % (ref 0–5)
LACTATE SERPL-SCNC: 1.3 MMOL/L (ref 0.4–2)
LACTATE SERPL-SCNC: 1.4 MMOL/L (ref 0.4–2)
LYMPHOCYTES # BLD: 0.4 K/UL (ref 0.5–4.6)
LYMPHOCYTES NFR BLD: 5 % (ref 13–44)
MCH RBC QN AUTO: 27.8 PG (ref 26.1–32.9)
MCHC RBC AUTO-ENTMCNC: 29.7 G/DL (ref 31.4–35)
MCV RBC AUTO: 93.6 FL (ref 82–102)
MONOCYTES # BLD: 0.3 K/UL (ref 0.1–1.3)
MONOCYTES NFR BLD: 3 % (ref 4–12)
NEUTS SEG # BLD: 6.9 K/UL (ref 1.7–8.2)
NEUTS SEG NFR BLD: 91 % (ref 43–78)
NRBC # BLD: 0 K/UL (ref 0–0.2)
PLATELET # BLD AUTO: 244 K/UL (ref 150–450)
PMV BLD AUTO: 10.1 FL (ref 9.4–12.3)
POTASSIUM SERPL-SCNC: 3.4 MMOL/L (ref 3.5–5.1)
PROT SERPL-MCNC: 7.5 G/DL (ref 6.3–8.2)
RBC # BLD AUTO: 3.13 M/UL (ref 4.05–5.2)
RSV RNA NPH QL NAA+PROBE: NOT DETECTED
SARS-COV-2 RDRP RESP QL NAA+PROBE: NOT DETECTED
SODIUM SERPL-SCNC: 138 MMOL/L (ref 136–146)
SOURCE: NORMAL
WBC # BLD AUTO: 7.7 K/UL (ref 4.3–11.1)

## 2024-03-21 PROCEDURE — 94640 AIRWAY INHALATION TREATMENT: CPT

## 2024-03-21 PROCEDURE — 6360000002 HC RX W HCPCS: Performed by: EMERGENCY MEDICINE

## 2024-03-21 PROCEDURE — 2580000003 HC RX 258: Performed by: EMERGENCY MEDICINE

## 2024-03-21 PROCEDURE — 87502 INFLUENZA DNA AMP PROBE: CPT

## 2024-03-21 PROCEDURE — 87635 SARS-COV-2 COVID-19 AMP PRB: CPT

## 2024-03-21 PROCEDURE — 87634 RSV DNA/RNA AMP PROBE: CPT

## 2024-03-21 PROCEDURE — 6370000000 HC RX 637 (ALT 250 FOR IP): Performed by: EMERGENCY MEDICINE

## 2024-03-21 PROCEDURE — 87040 BLOOD CULTURE FOR BACTERIA: CPT

## 2024-03-21 PROCEDURE — 83605 ASSAY OF LACTIC ACID: CPT

## 2024-03-21 PROCEDURE — 2700000000 HC OXYGEN THERAPY PER DAY

## 2024-03-21 PROCEDURE — 96365 THER/PROPH/DIAG IV INF INIT: CPT

## 2024-03-21 PROCEDURE — 85025 COMPLETE CBC W/AUTO DIFF WBC: CPT

## 2024-03-21 PROCEDURE — 71045 X-RAY EXAM CHEST 1 VIEW: CPT

## 2024-03-21 PROCEDURE — 99285 EMERGENCY DEPT VISIT HI MDM: CPT

## 2024-03-21 PROCEDURE — 96375 TX/PRO/DX INJ NEW DRUG ADDON: CPT

## 2024-03-21 PROCEDURE — 6370000000 HC RX 637 (ALT 250 FOR IP)

## 2024-03-21 PROCEDURE — 80053 COMPREHEN METABOLIC PANEL: CPT

## 2024-03-21 PROCEDURE — 36415 COLL VENOUS BLD VENIPUNCTURE: CPT

## 2024-03-21 PROCEDURE — 93010 ELECTROCARDIOGRAM REPORT: CPT | Performed by: INTERNAL MEDICINE

## 2024-03-21 PROCEDURE — 93005 ELECTROCARDIOGRAM TRACING: CPT | Performed by: EMERGENCY MEDICINE

## 2024-03-21 RX ORDER — IPRATROPIUM BROMIDE AND ALBUTEROL SULFATE 2.5; .5 MG/3ML; MG/3ML
SOLUTION RESPIRATORY (INHALATION)
Status: COMPLETED
Start: 2024-03-21 | End: 2024-03-21

## 2024-03-21 RX ORDER — 0.9 % SODIUM CHLORIDE 0.9 %
30 INTRAVENOUS SOLUTION INTRAVENOUS ONCE
Status: COMPLETED | OUTPATIENT
Start: 2024-03-21 | End: 2024-03-21

## 2024-03-21 RX ORDER — METHYLPREDNISOLONE SODIUM SUCCINATE 125 MG/2ML
125 INJECTION, POWDER, LYOPHILIZED, FOR SOLUTION INTRAMUSCULAR; INTRAVENOUS
Status: COMPLETED | OUTPATIENT
Start: 2024-03-21 | End: 2024-03-21

## 2024-03-21 RX ORDER — METHYLPREDNISOLONE 4 MG/1
TABLET ORAL
Qty: 1 KIT | Refills: 0 | Status: ON HOLD | OUTPATIENT
Start: 2024-03-21 | End: 2024-03-28 | Stop reason: HOSPADM

## 2024-03-21 RX ORDER — IPRATROPIUM BROMIDE AND ALBUTEROL SULFATE 2.5; .5 MG/3ML; MG/3ML
1 SOLUTION RESPIRATORY (INHALATION)
Status: COMPLETED | OUTPATIENT
Start: 2024-03-21 | End: 2024-03-21

## 2024-03-21 RX ORDER — DOXYCYCLINE HYCLATE 100 MG
100 TABLET ORAL 2 TIMES DAILY
Qty: 14 TABLET | Refills: 0 | Status: ON HOLD | OUTPATIENT
Start: 2024-03-21 | End: 2024-03-28 | Stop reason: HOSPADM

## 2024-03-21 RX ADMIN — AZITHROMYCIN MONOHYDRATE 500 MG: 500 INJECTION, POWDER, LYOPHILIZED, FOR SOLUTION INTRAVENOUS at 11:51

## 2024-03-21 RX ADMIN — IPRATROPIUM BROMIDE AND ALBUTEROL SULFATE 1 DOSE: .5; 3 SOLUTION RESPIRATORY (INHALATION) at 13:45

## 2024-03-21 RX ADMIN — IPRATROPIUM BROMIDE AND ALBUTEROL SULFATE 1 DOSE: 2.5; .5 SOLUTION RESPIRATORY (INHALATION) at 11:20

## 2024-03-21 RX ADMIN — METHYLPREDNISOLONE SODIUM SUCCINATE 125 MG: 125 INJECTION INTRAMUSCULAR; INTRAVENOUS at 11:51

## 2024-03-21 RX ADMIN — SODIUM CHLORIDE 1917 ML: 9 INJECTION, SOLUTION INTRAVENOUS at 11:45

## 2024-03-21 RX ADMIN — IPRATROPIUM BROMIDE AND ALBUTEROL SULFATE 1 DOSE: .5; 3 SOLUTION RESPIRATORY (INHALATION) at 11:20

## 2024-03-21 RX ADMIN — CEFTRIAXONE 1000 MG: 1 INJECTION, POWDER, FOR SOLUTION INTRAMUSCULAR; INTRAVENOUS at 11:51

## 2024-03-21 ASSESSMENT — ENCOUNTER SYMPTOMS
SINUS PAIN: 0
CONSTIPATION: 0
WHEEZING: 1
DIARRHEA: 0
ABDOMINAL PAIN: 0
COUGH: 1
EYE PAIN: 0
BACK PAIN: 0
SHORTNESS OF BREATH: 1
VOMITING: 0
EYE REDNESS: 0
NAUSEA: 0
TROUBLE SWALLOWING: 0

## 2024-03-21 ASSESSMENT — LIFESTYLE VARIABLES
HOW OFTEN DO YOU HAVE A DRINK CONTAINING ALCOHOL: NEVER
HOW MANY STANDARD DRINKS CONTAINING ALCOHOL DO YOU HAVE ON A TYPICAL DAY: PATIENT DOES NOT DRINK

## 2024-03-21 ASSESSMENT — PAIN - FUNCTIONAL ASSESSMENT
PAIN_FUNCTIONAL_ASSESSMENT: 0-10
PAIN_FUNCTIONAL_ASSESSMENT: 0-10
PAIN_FUNCTIONAL_ASSESSMENT: NONE - DENIES PAIN
PAIN_FUNCTIONAL_ASSESSMENT: 0-10
PAIN_FUNCTIONAL_ASSESSMENT: 0-10

## 2024-03-21 ASSESSMENT — PAIN SCALES - GENERAL
PAINLEVEL_OUTOF10: 0

## 2024-03-21 NOTE — ED TRIAGE NOTES
Pt brought in by EMS from [facility] c/o SOB, congestion, and cough (x3 days). Pt report she has been febrile (tmax 100.8) today. Pt SpO2 91% on RA. Pt wears 3 L NC PRN at baseline. Pt received duoneb treatment prior to arrival. Pt BP en route 90/50, this is baseline BP per pt.

## 2024-03-21 NOTE — PROGRESS NOTES
Hospital medicine team consulted for admission.    When I walked in the room patient is adamant she does not want admission. She is now on room air and appears comfortable.    Let the medicine team know if she decides she wants to be admitted.

## 2024-03-21 NOTE — ED NOTES
TRANSFER - OUT REPORT:    Verbal report given to CANDICE Mckinney at [place] on Ama Chu  being transferred to [facility] room 323 for discharge/return to facility with [facility]    Report consisted of patient's Situation, Background, Assessment and   Recommendations(SBAR).     Information from the following report(s) Nurse Handoff Report, ED SBAR was reviewed with the receiving nurse.    [place] Fall Assessment:    Presents to emergency department  because of falls (Syncope, seizure, or loss of consciousness): No  Age > 70: Yes  Altered Mental Status, Intoxication with alcohol or substance confusion (Disorientation, impaired judgment, poor safety awaremess, or inability to follow instructions): No  Impaired Mobility: Ambulates or transfers with assistive devices or assistance; Unable to ambulate or transer.: Yes  Nursing Judgement: Yes          Lines:   Peripheral IV 03/21/24 Right Hand (Active)       Peripheral IV 03/21/24 Left Wrist (Active)        Opportunity for questions and clarification was provided.                 Elli Santo RN  03/21/24 9099

## 2024-03-21 NOTE — CARE COORDINATION
Chart review complete, CM met with pt and daughter Migdalia, pt is to return to [facility] to continue rehab CM confirmed with Sena at facility pt is returning, primary RN Elli provided phone number to call report. EMS transport will be required for pt to return to facility.  Pt will return to room 232, phone [phone]       03/21/24 3560   Service Assessment   Patient Orientation Alert and Oriented   Cognition Alert   History Provided By Child/Family;Patient   Primary Caregiver Family   Accompanied By/Relationship daughter   Support Systems Children   Patient's Healthcare Decision Maker is: Legal Next of Kin   PCP Verified by CM Yes   Prior Functional Level Assistance with the following:;Bathing;Dressing;Toileting;Cooking;Housework;Shopping;Mobility   Current Functional Level Assistance with the following:;Bathing;Dressing;Toileting;Cooking;Housework;Shopping;Mobility   Can patient return to prior living arrangement Yes   Ability to make needs known: Good   Family able to assist with home care needs: Yes   Would you like for me to discuss the discharge plan with any other family members/significant others, and if so, who? Yes   Financial Resources Medicare   Community Resources Other (Comment)  (STR)   CM/SW Referral Other (see comment)  (na)   Social/Functional History   Lives With Daughter   Type of Home House   Home Layout Multi-level  (House has 3 stories)   Home Access Level entry   Bathroom Shower/Tub Walk-in shower   Bathroom Toilet Standard   Bathroom Equipment None   Bathroom Accessibility Accessible   Home Equipment Walker, rolling;Wheelchair-manual   Receives Help From Family   ADL Assistance Independent   Homemaking Assistance Independent   Homemaking Responsibilities Yes   Ambulation Assistance Independent   Transfer Assistance Independent   Active  No   Patient's  Info patient's daughter   Occupation Retired   Discharge Planning   Type of Residence Skilled Nursing Facility

## 2024-03-21 NOTE — ED PROVIDER NOTES
Emergency Department Provider Note       PCP: Jose Miguel Jimenez Jr., MD   Age: 73 y.o.   Sex: female     DISPOSITION Decision To Admit 03/21/2024 01:34:43 PM       ICD-10-CM    1. Acute respiratory failure with hypoxia (HCC)  J96.01       2. Pneumonia of both lower lobes due to infectious organism  J18.9           Medical Decision Making     83-year-old female patient presents from her care facility with worsening cough shortness of breath  Found to be wheezing and hypoxic  Corrected easily with 2 to 3 L of nasal cannula oxygen  Workup today reveals pneumonia but no evidence of sepsis  Patient will be admitted for IV antibiotics and to control her respiratory distress and wheezing  Hospitalist consulted for admission     1 or more acute illnesses that pose a threat to life or bodily function.   Discussion with external consultants.  Shared medical decision making was utilized in creating the patients health plan today.    I independently ordered and reviewed each unique test.  I reviewed external records: ED visit note from an outside group.  I reviewed external records: provider visit note from PCP.  I reviewed external records: provider visit note from outside specialist.   The patients assessment required an independent historian: Family at bedside.  The reason they were needed is important historical information not provided by the patient.  I interpreted the X-rays chest x-ray reveals infiltrate consistent with patchy pneumonia bilateral lower lungs.    The patient was admitted and I have discussed patient management with the admitting provider.    Patient met 2+ SIRS criteria however lactic acid is negative    Critical care procedure note : 110 minutes of critical care time was performed in the emergency department. This was separate from any other procedures listed during the patients emergency department course. The failure to initiate these interventions on an urgent basis would likely have resulted in

## 2024-03-21 NOTE — DISCHARGE INSTRUCTIONS
Complete all antibiotics    Take your fluid pill daily instead of just every other day    Call your doctor for close follow-up appointment    Return to ER for any worsening symptoms or new problems which may arise

## 2024-03-21 NOTE — ED NOTES
I have reviewed discharge instructions with the patient and caregiver.  The patient and caregiver verbalized understanding.    Patient left ED via Discharge Method: stretcher to Home with [facility]    Opportunity for questions and clarification provided.       Patient given 2 scripts.         To continue your aftercare when you leave the hospital, you may receive an automated call from our care team to check in on how you are doing.  This is a free service and part of our promise to provide the best care and service to meet your aftercare needs.” If you have questions, or wish to unsubscribe from this service please call [phone].  Thank you for Choosing our [facility] Emergency Department.        Elli Santo, RN  03/21/24 8469

## 2024-03-23 ENCOUNTER — APPOINTMENT (OUTPATIENT)
Dept: GENERAL RADIOLOGY | Age: 74
DRG: 871 | End: 2024-03-23
Payer: MEDICARE

## 2024-03-23 ENCOUNTER — HOSPITAL ENCOUNTER (INPATIENT)
Age: 74
LOS: 5 days | Discharge: NURSING, REHAB OR LONG-TERM CARE | DRG: 871 | End: 2024-03-28
Attending: EMERGENCY MEDICINE | Admitting: ORTHOPAEDIC SURGERY
Payer: MEDICARE

## 2024-03-23 ENCOUNTER — ANESTHESIA (OUTPATIENT)
Dept: SURGERY | Age: 74
End: 2024-03-23
Payer: MEDICARE

## 2024-03-23 ENCOUNTER — ANESTHESIA EVENT (OUTPATIENT)
Dept: SURGERY | Age: 74
End: 2024-03-23
Payer: MEDICARE

## 2024-03-23 ENCOUNTER — APPOINTMENT (OUTPATIENT)
Dept: CT IMAGING | Age: 74
DRG: 871 | End: 2024-03-23
Payer: MEDICARE

## 2024-03-23 DIAGNOSIS — J18.9 PNEUMONIA OF LEFT LOWER LOBE DUE TO INFECTIOUS ORGANISM: ICD-10-CM

## 2024-03-23 DIAGNOSIS — L02.416 ABSCESS OF LEFT THIGH: ICD-10-CM

## 2024-03-23 DIAGNOSIS — A41.9 SEPTICEMIA (HCC): Primary | ICD-10-CM

## 2024-03-23 DIAGNOSIS — T84.60XA: ICD-10-CM

## 2024-03-23 PROBLEM — E83.42 HYPOMAGNESEMIA: Status: ACTIVE | Noted: 2024-03-23

## 2024-03-23 PROBLEM — L02.91 ABSCESS: Status: ACTIVE | Noted: 2024-03-23

## 2024-03-23 PROBLEM — E87.6 HYPOKALEMIA: Status: ACTIVE | Noted: 2024-03-23

## 2024-03-23 LAB
ABO + RH BLD: NORMAL
ALBUMIN SERPL-MCNC: 2.4 G/DL (ref 3.2–4.6)
ALBUMIN/GLOB SERPL: 0.4 (ref 0.4–1.6)
ALP SERPL-CCNC: 74 U/L (ref 50–136)
ALT SERPL-CCNC: 14 U/L (ref 12–65)
ANION GAP SERPL CALC-SCNC: 2 MMOL/L (ref 2–11)
APPEARANCE UR: ABNORMAL
AST SERPL-CCNC: 14 U/L (ref 15–37)
BACTERIA URNS QL MICRO: ABNORMAL /HPF
BASOPHILS # BLD: 0 K/UL (ref 0–0.2)
BASOPHILS NFR BLD: 0 % (ref 0–2)
BILIRUB SERPL-MCNC: 0.4 MG/DL (ref 0.2–1.1)
BILIRUB UR QL: NEGATIVE
BLOOD GROUP ANTIBODIES SERPL: NORMAL
BUN SERPL-MCNC: 14 MG/DL (ref 8–23)
CALCIUM SERPL-MCNC: 10 MG/DL (ref 8.3–10.4)
CASTS URNS QL MICRO: ABNORMAL /LPF
CHLORIDE SERPL-SCNC: 97 MMOL/L (ref 103–113)
CO2 SERPL-SCNC: 38 MMOL/L (ref 21–32)
COLOR UR: ABNORMAL
CREAT SERPL-MCNC: 0.8 MG/DL (ref 0.6–1)
DIFFERENTIAL METHOD BLD: ABNORMAL
EOSINOPHIL # BLD: 0 K/UL (ref 0–0.8)
EOSINOPHIL NFR BLD: 0 % (ref 0.5–7.8)
EPI CELLS #/AREA URNS HPF: ABNORMAL /HPF
ERYTHROCYTE [DISTWIDTH] IN BLOOD BY AUTOMATED COUNT: 19 % (ref 11.9–14.6)
GLOBULIN SER CALC-MCNC: 5.8 G/DL (ref 2.8–4.5)
GLUCOSE SERPL-MCNC: 99 MG/DL (ref 65–100)
GLUCOSE UR STRIP.AUTO-MCNC: NEGATIVE MG/DL
HCT VFR BLD AUTO: 33.1 % (ref 35.8–46.3)
HGB BLD-MCNC: 9.6 G/DL (ref 11.7–15.4)
HGB UR QL STRIP: NEGATIVE
IMM GRANULOCYTES # BLD AUTO: 0 K/UL (ref 0–0.5)
IMM GRANULOCYTES NFR BLD AUTO: 0 % (ref 0–5)
KETONES UR QL STRIP.AUTO: ABNORMAL MG/DL
LACTATE SERPL-SCNC: 1.6 MMOL/L (ref 0.4–2)
LEUKOCYTE ESTERASE UR QL STRIP.AUTO: ABNORMAL
LYMPHOCYTES # BLD: 0.5 K/UL (ref 0.5–4.6)
LYMPHOCYTES NFR BLD: 6 % (ref 13–44)
MAGNESIUM SERPL-MCNC: 1.6 MG/DL (ref 1.8–2.4)
MCH RBC QN AUTO: 27.7 PG (ref 26.1–32.9)
MCHC RBC AUTO-ENTMCNC: 29 G/DL (ref 31.4–35)
MCV RBC AUTO: 95.4 FL (ref 82–102)
MONOCYTES # BLD: 0.4 K/UL (ref 0.1–1.3)
MONOCYTES NFR BLD: 5 % (ref 4–12)
NEUTS SEG # BLD: 6.2 K/UL (ref 1.7–8.2)
NEUTS SEG NFR BLD: 88 % (ref 43–78)
NITRITE UR QL STRIP.AUTO: NEGATIVE
NRBC # BLD: 0 K/UL (ref 0–0.2)
PH UR STRIP: 5.5 (ref 5–9)
PLATELET # BLD AUTO: 260 K/UL (ref 150–450)
PMV BLD AUTO: 10.3 FL (ref 9.4–12.3)
POTASSIUM SERPL-SCNC: 2.9 MMOL/L (ref 3.5–5.1)
PROCALCITONIN SERPL-MCNC: 0.2 NG/ML (ref 0–0.49)
PROT SERPL-MCNC: 8.2 G/DL (ref 6.3–8.2)
PROT UR STRIP-MCNC: ABNORMAL MG/DL
RBC # BLD AUTO: 3.47 M/UL (ref 4.05–5.2)
RBC #/AREA URNS HPF: ABNORMAL /HPF
SODIUM SERPL-SCNC: 137 MMOL/L (ref 136–146)
SP GR UR REFRACTOMETRY: 1.02 (ref 1–1.02)
SPECIMEN EXP DATE BLD: NORMAL
UROBILINOGEN UR QL STRIP.AUTO: 0.2 EU/DL (ref 0.2–1)
WBC # BLD AUTO: 7.1 K/UL (ref 4.3–11.1)
WBC URNS QL MICRO: ABNORMAL /HPF

## 2024-03-23 PROCEDURE — 71045 X-RAY EXAM CHEST 1 VIEW: CPT

## 2024-03-23 PROCEDURE — 2500000003 HC RX 250 WO HCPCS: Performed by: ANESTHESIOLOGY

## 2024-03-23 PROCEDURE — 93005 ELECTROCARDIOGRAM TRACING: CPT | Performed by: EMERGENCY MEDICINE

## 2024-03-23 PROCEDURE — 87077 CULTURE AEROBIC IDENTIFY: CPT

## 2024-03-23 PROCEDURE — 85025 COMPLETE CBC W/AUTO DIFF WBC: CPT

## 2024-03-23 PROCEDURE — 83605 ASSAY OF LACTIC ACID: CPT

## 2024-03-23 PROCEDURE — 86850 RBC ANTIBODY SCREEN: CPT

## 2024-03-23 PROCEDURE — 6360000002 HC RX W HCPCS: Performed by: EMERGENCY MEDICINE

## 2024-03-23 PROCEDURE — 0Y9J0ZZ DRAINAGE OF LEFT LOWER LEG, OPEN APPROACH: ICD-10-PCS | Performed by: ORTHOPAEDIC SURGERY

## 2024-03-23 PROCEDURE — 87040 BLOOD CULTURE FOR BACTERIA: CPT

## 2024-03-23 PROCEDURE — 87116 MYCOBACTERIA CULTURE: CPT

## 2024-03-23 PROCEDURE — 6360000002 HC RX W HCPCS: Performed by: ORTHOPAEDIC SURGERY

## 2024-03-23 PROCEDURE — 2500000003 HC RX 250 WO HCPCS: Performed by: NURSE ANESTHETIST, CERTIFIED REGISTERED

## 2024-03-23 PROCEDURE — 5A1935Z RESPIRATORY VENTILATION, LESS THAN 24 CONSECUTIVE HOURS: ICD-10-PCS | Performed by: ORTHOPAEDIC SURGERY

## 2024-03-23 PROCEDURE — 96365 THER/PROPH/DIAG IV INF INIT: CPT

## 2024-03-23 PROCEDURE — 83735 ASSAY OF MAGNESIUM: CPT

## 2024-03-23 PROCEDURE — 73701 CT LOWER EXTREMITY W/DYE: CPT

## 2024-03-23 PROCEDURE — 2000000000 HC ICU R&B

## 2024-03-23 PROCEDURE — 6360000002 HC RX W HCPCS: Performed by: NURSE ANESTHETIST, CERTIFIED REGISTERED

## 2024-03-23 PROCEDURE — 96375 TX/PRO/DX INJ NEW DRUG ADDON: CPT

## 2024-03-23 PROCEDURE — 2500000003 HC RX 250 WO HCPCS: Performed by: EMERGENCY MEDICINE

## 2024-03-23 PROCEDURE — 3600000012 HC SURGERY LEVEL 2 ADDTL 15MIN: Performed by: ORTHOPAEDIC SURGERY

## 2024-03-23 PROCEDURE — 87102 FUNGUS ISOLATION CULTURE: CPT

## 2024-03-23 PROCEDURE — 87205 SMEAR GRAM STAIN: CPT

## 2024-03-23 PROCEDURE — 3E0U329 INTRODUCTION OF OTHER ANTI-INFECTIVE INTO JOINTS, PERCUTANEOUS APPROACH: ICD-10-PCS | Performed by: ORTHOPAEDIC SURGERY

## 2024-03-23 PROCEDURE — 2580000003 HC RX 258: Performed by: ORTHOPAEDIC SURGERY

## 2024-03-23 PROCEDURE — C1713 ANCHOR/SCREW BN/BN,TIS/BN: HCPCS | Performed by: ORTHOPAEDIC SURGERY

## 2024-03-23 PROCEDURE — 0BH17EZ INSERTION OF ENDOTRACHEAL AIRWAY INTO TRACHEA, VIA NATURAL OR ARTIFICIAL OPENING: ICD-10-PCS | Performed by: ORTHOPAEDIC SURGERY

## 2024-03-23 PROCEDURE — 2580000003 HC RX 258: Performed by: EMERGENCY MEDICINE

## 2024-03-23 PROCEDURE — 87075 CULTR BACTERIA EXCEPT BLOOD: CPT

## 2024-03-23 PROCEDURE — 87186 SC STD MICRODIL/AGAR DIL: CPT

## 2024-03-23 PROCEDURE — 6360000004 HC RX CONTRAST MEDICATION: Performed by: EMERGENCY MEDICINE

## 2024-03-23 PROCEDURE — 81001 URINALYSIS AUTO W/SCOPE: CPT

## 2024-03-23 PROCEDURE — 1100000000 HC RM PRIVATE

## 2024-03-23 PROCEDURE — 2580000003 HC RX 258: Performed by: INTERNAL MEDICINE

## 2024-03-23 PROCEDURE — 2580000003 HC RX 258: Performed by: ANESTHESIOLOGY

## 2024-03-23 PROCEDURE — 3700000000 HC ANESTHESIA ATTENDED CARE: Performed by: ORTHOPAEDIC SURGERY

## 2024-03-23 PROCEDURE — 2580000003 HC RX 258: Performed by: NURSE ANESTHETIST, CERTIFIED REGISTERED

## 2024-03-23 PROCEDURE — 86900 BLOOD TYPING SEROLOGIC ABO: CPT

## 2024-03-23 PROCEDURE — 2W1MX6Z COMPRESSION OF LEFT LOWER EXTREMITY USING PRESSURE DRESSING: ICD-10-PCS | Performed by: ORTHOPAEDIC SURGERY

## 2024-03-23 PROCEDURE — 80053 COMPREHEN METABOLIC PANEL: CPT

## 2024-03-23 PROCEDURE — 87070 CULTURE OTHR SPECIMN AEROBIC: CPT

## 2024-03-23 PROCEDURE — 6360000002 HC RX W HCPCS: Performed by: ANESTHESIOLOGY

## 2024-03-23 PROCEDURE — 6370000000 HC RX 637 (ALT 250 FOR IP): Performed by: ORTHOPAEDIC SURGERY

## 2024-03-23 PROCEDURE — 3700000001 HC ADD 15 MINUTES (ANESTHESIA): Performed by: ORTHOPAEDIC SURGERY

## 2024-03-23 PROCEDURE — 2700000000 HC OXYGEN THERAPY PER DAY

## 2024-03-23 PROCEDURE — 6370000000 HC RX 637 (ALT 250 FOR IP): Performed by: EMERGENCY MEDICINE

## 2024-03-23 PROCEDURE — 84145 PROCALCITONIN (PCT): CPT

## 2024-03-23 PROCEDURE — 3600000002 HC SURGERY LEVEL 2 BASE: Performed by: ORTHOPAEDIC SURGERY

## 2024-03-23 PROCEDURE — 73552 X-RAY EXAM OF FEMUR 2/>: CPT

## 2024-03-23 PROCEDURE — 87176 TISSUE HOMOGENIZATION CULTR: CPT

## 2024-03-23 PROCEDURE — 71260 CT THORAX DX C+: CPT

## 2024-03-23 PROCEDURE — 86901 BLOOD TYPING SEROLOGIC RH(D): CPT

## 2024-03-23 PROCEDURE — 99285 EMERGENCY DEPT VISIT HI MDM: CPT

## 2024-03-23 PROCEDURE — 87206 SMEAR FLUORESCENT/ACID STAI: CPT

## 2024-03-23 PROCEDURE — 96366 THER/PROPH/DIAG IV INF ADDON: CPT

## 2024-03-23 PROCEDURE — 94640 AIRWAY INHALATION TREATMENT: CPT

## 2024-03-23 PROCEDURE — 2709999900 HC NON-CHARGEABLE SUPPLY: Performed by: ORTHOPAEDIC SURGERY

## 2024-03-23 PROCEDURE — 94002 VENT MGMT INPAT INIT DAY: CPT

## 2024-03-23 PROCEDURE — 96367 TX/PROPH/DG ADDL SEQ IV INF: CPT

## 2024-03-23 DEVICE — STIMULAN® RAPID CURE PROVIDED STERILE FOR SINGLE PATIENT USE. STIMULAN® RAPID CURE CONTAINS CALCIUM SULFATE POWDER AND MIXING SOLUTION IN PRE-MEASURED QUANTITIES SO THAT WHEN MIXED TOGETHER IN A STERILE MIXING BOWL, THE RESULTANT PASTE IS TO BE DIGITALLY PACKED INTO OPEN BONE VOID/GAP TO SET INSITU OR PLACED INTO THE MOULD PROVIDED, THE MIXTURE SETS TO FORM BEADS. THE BIODEGRADABLE, RADIOPAQUE BEADS ARE RESORBED IN APPROXIMATELY 30 – 60 DAYS WHEN USED IN ACCORDANCE WITH THE DEVICE LABELLING. STIMULAN® RAPID CURE IS MANUFACTURED FROM SYNTHETIC IMPLANT GRADE CALCIUM SULFATE DIHYDRATE(CASO4.2H2O) THAT RESORBS AND IS REPLACED WITH BONE DURING THE HEALING PROCESS. ALSO, AS THE BONE VOID FILLER BEADS ARE BIODEGRADABLE AND BIOCOMPATIBLE, THEY MAY BE USED AT AN INFECTED SITE.
Type: IMPLANTABLE DEVICE | Site: LEG | Status: FUNCTIONAL
Brand: STIMULAN® RAPID CURE

## 2024-03-23 RX ORDER — SODIUM CHLORIDE 9 MG/ML
INJECTION, SOLUTION INTRAVENOUS PRN
Status: DISCONTINUED | OUTPATIENT
Start: 2024-03-23 | End: 2024-03-28 | Stop reason: HOSPADM

## 2024-03-23 RX ORDER — ONDANSETRON 4 MG/1
4 TABLET, ORALLY DISINTEGRATING ORAL EVERY 8 HOURS PRN
Status: DISCONTINUED | OUTPATIENT
Start: 2024-03-23 | End: 2024-03-23

## 2024-03-23 RX ORDER — PANTOPRAZOLE SODIUM 40 MG/1
40 TABLET, DELAYED RELEASE ORAL
Status: DISCONTINUED | OUTPATIENT
Start: 2024-03-24 | End: 2024-03-24

## 2024-03-23 RX ORDER — OXYCODONE HYDROCHLORIDE 5 MG/1
5 TABLET ORAL EVERY 4 HOURS PRN
Status: DISCONTINUED | OUTPATIENT
Start: 2024-03-23 | End: 2024-03-28 | Stop reason: HOSPADM

## 2024-03-23 RX ORDER — POTASSIUM CHLORIDE 20 MEQ/1
40 TABLET, EXTENDED RELEASE ORAL PRN
Status: DISCONTINUED | OUTPATIENT
Start: 2024-03-23 | End: 2024-03-28

## 2024-03-23 RX ORDER — NALOXONE HYDROCHLORIDE 0.4 MG/ML
INJECTION, SOLUTION INTRAMUSCULAR; INTRAVENOUS; SUBCUTANEOUS PRN
Status: DISCONTINUED | OUTPATIENT
Start: 2024-03-23 | End: 2024-03-23 | Stop reason: HOSPADM

## 2024-03-23 RX ORDER — PROPOFOL 10 MG/ML
INJECTION, EMULSION INTRAVENOUS CONTINUOUS PRN
Status: DISCONTINUED | OUTPATIENT
Start: 2024-03-23 | End: 2024-03-23 | Stop reason: SDUPTHER

## 2024-03-23 RX ORDER — ALBUTEROL SULFATE 2.5 MG/3ML
2.5 SOLUTION RESPIRATORY (INHALATION) EVERY 4 HOURS PRN
Status: DISCONTINUED | OUTPATIENT
Start: 2024-03-23 | End: 2024-03-23 | Stop reason: HOSPADM

## 2024-03-23 RX ORDER — OXYCODONE HYDROCHLORIDE 5 MG/1
5 TABLET ORAL
Status: DISCONTINUED | OUTPATIENT
Start: 2024-03-23 | End: 2024-03-23 | Stop reason: HOSPADM

## 2024-03-23 RX ORDER — ONDANSETRON 2 MG/ML
INJECTION INTRAMUSCULAR; INTRAVENOUS PRN
Status: DISCONTINUED | OUTPATIENT
Start: 2024-03-23 | End: 2024-03-23 | Stop reason: SDUPTHER

## 2024-03-23 RX ORDER — HYDROMORPHONE HYDROCHLORIDE 2 MG/ML
0.5 INJECTION, SOLUTION INTRAMUSCULAR; INTRAVENOUS; SUBCUTANEOUS
Status: DISCONTINUED | OUTPATIENT
Start: 2024-03-23 | End: 2024-03-25

## 2024-03-23 RX ORDER — ALLOPURINOL 100 MG/1
100 TABLET ORAL DAILY
Status: DISCONTINUED | OUTPATIENT
Start: 2024-03-24 | End: 2024-03-28 | Stop reason: HOSPADM

## 2024-03-23 RX ORDER — ACETAMINOPHEN 650 MG/1
650 SUPPOSITORY RECTAL EVERY 6 HOURS PRN
Status: DISCONTINUED | OUTPATIENT
Start: 2024-03-23 | End: 2024-03-28 | Stop reason: HOSPADM

## 2024-03-23 RX ORDER — ETOMIDATE 2 MG/ML
INJECTION INTRAVENOUS PRN
Status: DISCONTINUED | OUTPATIENT
Start: 2024-03-23 | End: 2024-03-23 | Stop reason: SDUPTHER

## 2024-03-23 RX ORDER — OXYCODONE HYDROCHLORIDE 5 MG/1
10 TABLET ORAL EVERY 4 HOURS PRN
Status: DISCONTINUED | OUTPATIENT
Start: 2024-03-23 | End: 2024-03-28 | Stop reason: HOSPADM

## 2024-03-23 RX ORDER — SODIUM CHLORIDE 0.9 % (FLUSH) 0.9 %
5-40 SYRINGE (ML) INJECTION EVERY 12 HOURS SCHEDULED
Status: DISCONTINUED | OUTPATIENT
Start: 2024-03-23 | End: 2024-03-28 | Stop reason: HOSPADM

## 2024-03-23 RX ORDER — ROCURONIUM BROMIDE 10 MG/ML
INJECTION, SOLUTION INTRAVENOUS PRN
Status: DISCONTINUED | OUTPATIENT
Start: 2024-03-23 | End: 2024-03-23 | Stop reason: SDUPTHER

## 2024-03-23 RX ORDER — SODIUM CHLORIDE 0.9 % (FLUSH) 0.9 %
5-40 SYRINGE (ML) INJECTION PRN
Status: DISCONTINUED | OUTPATIENT
Start: 2024-03-23 | End: 2024-03-28 | Stop reason: HOSPADM

## 2024-03-23 RX ORDER — CLOPIDOGREL BISULFATE 75 MG/1
75 TABLET ORAL DAILY
Status: DISCONTINUED | OUTPATIENT
Start: 2024-03-24 | End: 2024-03-23

## 2024-03-23 RX ORDER — TORSEMIDE 100 MG/1
100 TABLET ORAL DAILY
Status: DISCONTINUED | OUTPATIENT
Start: 2024-03-24 | End: 2024-03-28 | Stop reason: HOSPADM

## 2024-03-23 RX ORDER — MAGNESIUM SULFATE IN WATER 40 MG/ML
2000 INJECTION, SOLUTION INTRAVENOUS ONCE
Status: COMPLETED | OUTPATIENT
Start: 2024-03-23 | End: 2024-03-24

## 2024-03-23 RX ORDER — VANCOMYCIN HYDROCHLORIDE 1 G/20ML
INJECTION, POWDER, LYOPHILIZED, FOR SOLUTION INTRAVENOUS PRN
Status: DISCONTINUED | OUTPATIENT
Start: 2024-03-23 | End: 2024-03-23 | Stop reason: ALTCHOICE

## 2024-03-23 RX ORDER — FENTANYL CITRATE 50 UG/ML
INJECTION, SOLUTION INTRAMUSCULAR; INTRAVENOUS PRN
Status: DISCONTINUED | OUTPATIENT
Start: 2024-03-23 | End: 2024-03-23 | Stop reason: SDUPTHER

## 2024-03-23 RX ORDER — ONDANSETRON 4 MG/1
4 TABLET, ORALLY DISINTEGRATING ORAL EVERY 8 HOURS PRN
Status: CANCELLED | OUTPATIENT
Start: 2024-03-23

## 2024-03-23 RX ORDER — DOXEPIN HYDROCHLORIDE 50 MG/1
150 CAPSULE ORAL NIGHTLY
Status: DISCONTINUED | OUTPATIENT
Start: 2024-03-23 | End: 2024-03-28 | Stop reason: HOSPADM

## 2024-03-23 RX ORDER — DEXAMETHASONE SODIUM PHOSPHATE 10 MG/ML
INJECTION INTRAMUSCULAR; INTRAVENOUS PRN
Status: DISCONTINUED | OUTPATIENT
Start: 2024-03-23 | End: 2024-03-23 | Stop reason: SDUPTHER

## 2024-03-23 RX ORDER — POTASSIUM CHLORIDE 7.45 MG/ML
10 INJECTION INTRAVENOUS PRN
Status: DISCONTINUED | OUTPATIENT
Start: 2024-03-23 | End: 2024-03-28

## 2024-03-23 RX ORDER — POLYETHYLENE GLYCOL 3350 17 G/17G
17 POWDER, FOR SOLUTION ORAL DAILY PRN
Status: DISCONTINUED | OUTPATIENT
Start: 2024-03-23 | End: 2024-03-28 | Stop reason: HOSPADM

## 2024-03-23 RX ORDER — PREDNISONE 5 MG/1
5 TABLET ORAL DAILY
Status: DISCONTINUED | OUTPATIENT
Start: 2024-03-24 | End: 2024-03-28 | Stop reason: HOSPADM

## 2024-03-23 RX ORDER — FERROUS SULFATE 325(65) MG
325 TABLET ORAL
Status: DISCONTINUED | OUTPATIENT
Start: 2024-03-24 | End: 2024-03-28 | Stop reason: HOSPADM

## 2024-03-23 RX ORDER — PROPOFOL 10 MG/ML
5-50 INJECTION, EMULSION INTRAVENOUS CONTINUOUS
Status: DISCONTINUED | OUTPATIENT
Start: 2024-03-23 | End: 2024-03-25

## 2024-03-23 RX ORDER — LORAZEPAM 1 MG/1
1 TABLET ORAL NIGHTLY
Status: DISCONTINUED | OUTPATIENT
Start: 2024-03-23 | End: 2024-03-23

## 2024-03-23 RX ORDER — TRAZODONE HYDROCHLORIDE 50 MG/1
300 TABLET ORAL NIGHTLY
Status: DISCONTINUED | OUTPATIENT
Start: 2024-03-23 | End: 2024-03-27

## 2024-03-23 RX ORDER — HYDROMORPHONE HYDROCHLORIDE 1 MG/ML
0.5 INJECTION, SOLUTION INTRAMUSCULAR; INTRAVENOUS; SUBCUTANEOUS
Status: COMPLETED | OUTPATIENT
Start: 2024-03-23 | End: 2024-03-23

## 2024-03-23 RX ORDER — SODIUM CHLORIDE, SODIUM LACTATE, POTASSIUM CHLORIDE, CALCIUM CHLORIDE 600; 310; 30; 20 MG/100ML; MG/100ML; MG/100ML; MG/100ML
INJECTION, SOLUTION INTRAVENOUS
Status: COMPLETED | OUTPATIENT
Start: 2024-03-23 | End: 2024-03-23

## 2024-03-23 RX ORDER — SODIUM CHLORIDE, SODIUM LACTATE, POTASSIUM CHLORIDE, CALCIUM CHLORIDE 600; 310; 30; 20 MG/100ML; MG/100ML; MG/100ML; MG/100ML
INJECTION, SOLUTION INTRAVENOUS CONTINUOUS PRN
Status: DISCONTINUED | OUTPATIENT
Start: 2024-03-23 | End: 2024-03-23 | Stop reason: SDUPTHER

## 2024-03-23 RX ORDER — METOPROLOL SUCCINATE 25 MG/1
12.5 TABLET, EXTENDED RELEASE ORAL DAILY
Status: DISCONTINUED | OUTPATIENT
Start: 2024-03-24 | End: 2024-03-28 | Stop reason: HOSPADM

## 2024-03-23 RX ORDER — MAGNESIUM SULFATE IN WATER 40 MG/ML
2000 INJECTION, SOLUTION INTRAVENOUS PRN
Status: DISCONTINUED | OUTPATIENT
Start: 2024-03-23 | End: 2024-03-27

## 2024-03-23 RX ORDER — ONDANSETRON 2 MG/ML
4 INJECTION INTRAMUSCULAR; INTRAVENOUS EVERY 6 HOURS PRN
Status: DISCONTINUED | OUTPATIENT
Start: 2024-03-23 | End: 2024-03-23

## 2024-03-23 RX ORDER — SUCCINYLCHOLINE/SOD CL,ISO/PF 200MG/10ML
SYRINGE (ML) INTRAVENOUS PRN
Status: DISCONTINUED | OUTPATIENT
Start: 2024-03-23 | End: 2024-03-23 | Stop reason: SDUPTHER

## 2024-03-23 RX ORDER — SODIUM CHLORIDE, SODIUM LACTATE, POTASSIUM CHLORIDE, AND CALCIUM CHLORIDE .6; .31; .03; .02 G/100ML; G/100ML; G/100ML; G/100ML
30 INJECTION, SOLUTION INTRAVENOUS ONCE
Status: COMPLETED | OUTPATIENT
Start: 2024-03-23 | End: 2024-03-23

## 2024-03-23 RX ORDER — FOLIC ACID 1 MG/1
1 TABLET ORAL DAILY
Status: DISCONTINUED | OUTPATIENT
Start: 2024-03-24 | End: 2024-03-28 | Stop reason: HOSPADM

## 2024-03-23 RX ORDER — ACETAMINOPHEN 325 MG/1
650 TABLET ORAL EVERY 6 HOURS PRN
Status: DISCONTINUED | OUTPATIENT
Start: 2024-03-23 | End: 2024-03-28 | Stop reason: HOSPADM

## 2024-03-23 RX ORDER — LORAZEPAM 1 MG/1
2 TABLET ORAL NIGHTLY
Status: DISCONTINUED | OUTPATIENT
Start: 2024-03-23 | End: 2024-03-27

## 2024-03-23 RX ORDER — TORSEMIDE 100 MG/1
50 TABLET ORAL NIGHTLY
Status: CANCELLED | OUTPATIENT
Start: 2024-03-23

## 2024-03-23 RX ORDER — LANOLIN ALCOHOL/MO/W.PET/CERES
3 CREAM (GRAM) TOPICAL
Status: DISCONTINUED | OUTPATIENT
Start: 2024-03-23 | End: 2024-03-28 | Stop reason: HOSPADM

## 2024-03-23 RX ORDER — POTASSIUM CHLORIDE 20 MEQ/1
40 TABLET, EXTENDED RELEASE ORAL ONCE
Status: COMPLETED | OUTPATIENT
Start: 2024-03-23 | End: 2024-03-23

## 2024-03-23 RX ORDER — TAMSULOSIN HYDROCHLORIDE 0.4 MG/1
0.4 CAPSULE ORAL DAILY
Status: DISCONTINUED | OUTPATIENT
Start: 2024-03-24 | End: 2024-03-28 | Stop reason: HOSPADM

## 2024-03-23 RX ORDER — PREGABALIN 75 MG/1
75 CAPSULE ORAL 2 TIMES DAILY
Status: DISCONTINUED | OUTPATIENT
Start: 2024-03-23 | End: 2024-03-28 | Stop reason: HOSPADM

## 2024-03-23 RX ORDER — ONDANSETRON 2 MG/ML
4 INJECTION INTRAMUSCULAR; INTRAVENOUS EVERY 6 HOURS PRN
Status: CANCELLED | OUTPATIENT
Start: 2024-03-23

## 2024-03-23 RX ORDER — ESCITALOPRAM OXALATE 10 MG/1
10 TABLET ORAL DAILY
Status: DISCONTINUED | OUTPATIENT
Start: 2024-03-24 | End: 2024-03-28 | Stop reason: HOSPADM

## 2024-03-23 RX ORDER — SODIUM CHLORIDE 9 MG/ML
INJECTION, SOLUTION INTRAVENOUS CONTINUOUS
Status: DISCONTINUED | OUTPATIENT
Start: 2024-03-23 | End: 2024-03-28 | Stop reason: HOSPADM

## 2024-03-23 RX ORDER — HYDROCODONE BITARTRATE AND ACETAMINOPHEN 5; 325 MG/1; MG/1
1 TABLET ORAL EVERY 6 HOURS PRN
Status: DISCONTINUED | OUTPATIENT
Start: 2024-03-23 | End: 2024-03-24 | Stop reason: SDUPTHER

## 2024-03-23 RX ORDER — LIDOCAINE HYDROCHLORIDE 20 MG/ML
INJECTION, SOLUTION EPIDURAL; INFILTRATION; INTRACAUDAL; PERINEURAL PRN
Status: DISCONTINUED | OUTPATIENT
Start: 2024-03-23 | End: 2024-03-23 | Stop reason: SDUPTHER

## 2024-03-23 RX ORDER — HYDROMORPHONE HYDROCHLORIDE 2 MG/ML
0.25 INJECTION, SOLUTION INTRAMUSCULAR; INTRAVENOUS; SUBCUTANEOUS EVERY 5 MIN PRN
Status: DISCONTINUED | OUTPATIENT
Start: 2024-03-23 | End: 2024-03-23 | Stop reason: HOSPADM

## 2024-03-23 RX ORDER — ONDANSETRON 2 MG/ML
4 INJECTION INTRAMUSCULAR; INTRAVENOUS
Status: COMPLETED | OUTPATIENT
Start: 2024-03-23 | End: 2024-03-23

## 2024-03-23 RX ORDER — IPRATROPIUM BROMIDE AND ALBUTEROL SULFATE 2.5; .5 MG/3ML; MG/3ML
1 SOLUTION RESPIRATORY (INHALATION)
Status: COMPLETED | OUTPATIENT
Start: 2024-03-23 | End: 2024-03-23

## 2024-03-23 RX ORDER — GENTAMICIN SULFATE 80 MG/100ML
INJECTION, SOLUTION INTRAVENOUS CONTINUOUS PRN
Status: COMPLETED | OUTPATIENT
Start: 2024-03-23 | End: 2024-03-23

## 2024-03-23 RX ORDER — ASPIRIN 81 MG/1
81 TABLET ORAL DAILY
Status: DISCONTINUED | OUTPATIENT
Start: 2024-03-23 | End: 2024-03-28 | Stop reason: HOSPADM

## 2024-03-23 RX ADMIN — IPRATROPIUM BROMIDE AND ALBUTEROL SULFATE 1 DOSE: .5; 3 SOLUTION RESPIRATORY (INHALATION) at 12:19

## 2024-03-23 RX ADMIN — SUGAMMADEX 200 MG: 100 INJECTION, SOLUTION INTRAVENOUS at 17:50

## 2024-03-23 RX ADMIN — POTASSIUM CHLORIDE 40 MEQ: 1500 TABLET, EXTENDED RELEASE ORAL at 14:34

## 2024-03-23 RX ADMIN — ETOMIDATE 10 MG: 2 INJECTION, SOLUTION INTRAVENOUS at 16:52

## 2024-03-23 RX ADMIN — FENTANYL CITRATE 50 MCG: 50 INJECTION, SOLUTION INTRAMUSCULAR; INTRAVENOUS at 16:52

## 2024-03-23 RX ADMIN — SODIUM CHLORIDE, POTASSIUM CHLORIDE, SODIUM LACTATE AND CALCIUM CHLORIDE 1917 ML: 600; 310; 30; 20 INJECTION, SOLUTION INTRAVENOUS at 11:58

## 2024-03-23 RX ADMIN — SODIUM CHLORIDE, PRESERVATIVE FREE 10 ML: 5 INJECTION INTRAVENOUS at 21:00

## 2024-03-23 RX ADMIN — Medication 1 AMPULE: at 16:19

## 2024-03-23 RX ADMIN — PIPERACILLIN AND TAZOBACTAM 4500 MG: 4; .5 INJECTION, POWDER, FOR SOLUTION INTRAVENOUS at 11:23

## 2024-03-23 RX ADMIN — PROPOFOL 25 MCG/KG/MIN: 10 INJECTION, EMULSION INTRAVENOUS at 18:22

## 2024-03-23 RX ADMIN — SODIUM CHLORIDE, POTASSIUM CHLORIDE, SODIUM LACTATE AND CALCIUM CHLORIDE: 600; 310; 30; 20 INJECTION, SOLUTION INTRAVENOUS at 16:19

## 2024-03-23 RX ADMIN — ONDANSETRON 4 MG: 2 INJECTION INTRAMUSCULAR; INTRAVENOUS at 12:10

## 2024-03-23 RX ADMIN — CEFAZOLIN 3000 MG: 10 INJECTION, POWDER, FOR SOLUTION INTRAVENOUS at 16:57

## 2024-03-23 RX ADMIN — ROCURONIUM BROMIDE 5 MG: 10 INJECTION, SOLUTION INTRAVENOUS at 16:52

## 2024-03-23 RX ADMIN — IOPAMIDOL 100 ML: 755 INJECTION, SOLUTION INTRAVENOUS at 13:22

## 2024-03-23 RX ADMIN — Medication 2500 MG: at 12:18

## 2024-03-23 RX ADMIN — SODIUM CHLORIDE: 900 INJECTION, SOLUTION INTRAVENOUS at 19:25

## 2024-03-23 RX ADMIN — PHENYLEPHRINE HYDROCHLORIDE 100 MCG: 0.1 INJECTION, SOLUTION INTRAVENOUS at 17:03

## 2024-03-23 RX ADMIN — PROPOFOL 35 MCG/KG/MIN: 10 INJECTION, EMULSION INTRAVENOUS at 22:33

## 2024-03-23 RX ADMIN — ONDANSETRON 4 MG: 2 INJECTION INTRAMUSCULAR; INTRAVENOUS at 17:04

## 2024-03-23 RX ADMIN — PROPOFOL 25 MCG/KG/MIN: 10 INJECTION, EMULSION INTRAVENOUS at 19:14

## 2024-03-23 RX ADMIN — Medication 160 MG: at 16:52

## 2024-03-23 RX ADMIN — ONDANSETRON 4 MG: 2 INJECTION INTRAMUSCULAR; INTRAVENOUS at 17:32

## 2024-03-23 RX ADMIN — PHENYLEPHRINE HYDROCHLORIDE 100 MCG: 0.1 INJECTION, SOLUTION INTRAVENOUS at 17:26

## 2024-03-23 RX ADMIN — PHENYLEPHRINE HYDROCHLORIDE 100 MCG: 0.1 INJECTION, SOLUTION INTRAVENOUS at 17:34

## 2024-03-23 RX ADMIN — LIDOCAINE HYDROCHLORIDE 60 MG: 20 INJECTION, SOLUTION EPIDURAL; INFILTRATION; INTRACAUDAL; PERINEURAL at 16:52

## 2024-03-23 RX ADMIN — PHENYLEPHRINE HYDROCHLORIDE 100 MCG: 0.1 INJECTION, SOLUTION INTRAVENOUS at 18:05

## 2024-03-23 RX ADMIN — HYDROMORPHONE HYDROCHLORIDE 0.5 MG: 1 INJECTION, SOLUTION INTRAMUSCULAR; INTRAVENOUS; SUBCUTANEOUS at 12:10

## 2024-03-23 RX ADMIN — SODIUM CHLORIDE, SODIUM LACTATE, POTASSIUM CHLORIDE, AND CALCIUM CHLORIDE: 600; 310; 30; 20 INJECTION, SOLUTION INTRAVENOUS at 16:45

## 2024-03-23 RX ADMIN — DEXAMETHASONE SODIUM PHOSPHATE 4 MG: 10 INJECTION INTRAMUSCULAR; INTRAVENOUS at 17:32

## 2024-03-23 ASSESSMENT — PAIN SCALES - GENERAL
PAINLEVEL_OUTOF10: 0
PAINLEVEL_OUTOF10: 9

## 2024-03-23 ASSESSMENT — PAIN DESCRIPTION - ORIENTATION: ORIENTATION: LEFT

## 2024-03-23 ASSESSMENT — PAIN - FUNCTIONAL ASSESSMENT: PAIN_FUNCTIONAL_ASSESSMENT: 0-10

## 2024-03-23 ASSESSMENT — PAIN DESCRIPTION - DESCRIPTORS
DESCRIPTORS: STABBING
DESCRIPTORS: ACHING;TIGHTNESS;THROBBING

## 2024-03-23 ASSESSMENT — PAIN DESCRIPTION - LOCATION: LOCATION: KNEE;LEG

## 2024-03-23 ASSESSMENT — PULMONARY FUNCTION TESTS
PIF_VALUE: 32
PIF_VALUE: 27

## 2024-03-23 NOTE — ED TRIAGE NOTES
Per EMS: patient is coming from [facility]- SNF- with a complaint of abscess to left lateral femur- drainage- no odor per EMS.  Sinus tach in route, no short of breath, no fever, drainage started today ( Femur fracture- surgery in January 2024)  Patient was diagnosed with pneumonia 2 days ago. Unknown if patient is actively in antibiotic treatment or no. No abdominal pain, no B&B problems, no chest pain. No shortness of breath. Cough is present- productive.    154/100, 110 HR, 91% RA - patient have been on oxygen therapy at 2l for 2 days related pneumonia. 98% at 2l in route.     HX: Pneumonia, CHF, depression, anxiety, recent femur surgery, COPD.

## 2024-03-23 NOTE — ED NOTES
TRANSFER - OUT REPORT:    Verbal report given to CANDICE Corey on Ama Chu  being transferred to [facility] for ordered procedure   . Per nurse, do not start magnesium drip at this time.     Report consisted of patient's Situation, Background, Assessment and   Recommendations(SBAR).     Information from the following report(s) ED Encounter Summary, ED SBAR, Intake/Output, and MAR was reviewed with the receiving nurse.           Lines:   Peripheral IV 03/23/24 Right Antecubital (Active)   Site Assessment Clean, dry & intact 03/23/24 1029   Line Status Blood return noted;Flushed;Specimen collected;Normal saline locked 03/23/24 1029   Phlebitis Assessment No symptoms 03/23/24 1029   Infiltration Assessment 0 03/23/24 1029   Dressing Status Clean, dry & intact 03/23/24 1029   Dressing Type Transparent 03/23/24 1029        Opportunity for questions and clarification was provided.      Patient transported with:  O2 @ 2lpm and Registered Nurse from [facility]           Owens, Becki, RN  03/23/24 1039

## 2024-03-23 NOTE — ANESTHESIA PRE PROCEDURE
mouth daily     • traZODone (DESYREL) 150 MG tablet Take 2 tablets by mouth nightly     • pregabalin (LYRICA) 75 MG capsule Take 1 capsule by mouth 2 times daily.     • etanercept (ENBREL) 50 MG/ML injection Inject 0.5 mLs into the skin once a week     • Methotrexate, PF, (RASUVO) 25 MG/0.5ML chemo injection pen Inject 25 mg into the skin once a week     • LORazepam (ATIVAN) 2 MG tablet Take 1 tablet by mouth nightly.     • folic acid (FOLVITE) 1 MG tablet Take 1 tablet by mouth daily     • melatonin 3 MG TABS tablet Take 1 tablet by mouth nightly         Allergies:  No Known Allergies    Problem List:    Patient Active Problem List   Diagnosis Code   • Closed disp comminuted fracture of shaft of left femur with malunion S72.352P   • Rheumatoid arthritis ([facility]) M06.9   • Hypertension I10   • Depression F32.A   • Neuropathy G62.9   • CKD (chronic kidney disease) stage 3, GFR 30-59 ml/min ([facility]) N18.30   • Iron deficiency anemia D50.9   • Weakness of right upper extremity R29.898   • Acute osteomyelitis of lumbar spine ([facility]) M46.26   • Acute respiratory failure with hypercapnia ([facility]) J96.02   • Anxiety F41.9   • Chronic diastolic heart failure ([facility]) I50.32   • Cutaneous lupus erythematosus L93.2   • Discitis of lumbar region M46.46   • Morbid obesity ([facility]) E66.01   • MEET (obstructive sleep apnea) G47.33   • Plantar fasciitis, left M72.2   • Sepsis ([facility]) A41.9   • Hypomagnesemia E83.42   • Hypokalemia E87.6   • Pneumonia J18.9   • Abscess of left thigh L02.416       Past Medical History:  No past medical history on file.    Past Surgical History:        Procedure Laterality Date   • LEG SURGERY Left 1/24/2024    LEFT FEMUR IM NAIL RUFUS INSERTION RETROGRADE performed by Carter Braga MD at  MAIN OR   • UPPER GASTROINTESTINAL ENDOSCOPY N/A 1/27/2024    EGD ESOPHAGOGASTRODUODENOSCOPY performed by Sri Shaw MD at  ENDOSCOPY       Social History:    Social History     Tobacco Use   • Smoking status: Former

## 2024-03-23 NOTE — ED PROVIDER NOTES
Emergency Department Provider Note       PCP: Jose Miguel Jimenez Jr., MD   Age: 73 y.o.   Sex: female     DISPOSITION Decision To Discharge 03/23/2024 02:34:42 PM       ICD-10-CM    1. Septicemia ([facility])  A41.9       2. Abscess of left thigh  L02.416       3. Infection associated with internal fixation device, unspecified bone, initial encounter ([facility])  T84.60XA       4. Pneumonia of left lower lobe due to infectious organism  J18.9           Medical Decision Making     Patient presented with concern for pneumonia diagnosed 2 days ago and new drainage from left leg wound left lateral thigh.  Recent ORIF by orthopedics.  Broad-spectrum antibiotics provided after cultures.  Chest x-ray showed continued consolidative process possible in the left lower lobe.  CT scan imaging showed a pattern more consistent with atelectasis.  CT scan imaging of the left thigh showed abscess and possible infected hardware and what appeared to be more of an acute femur fracture than a fracture from late January.  Orthopedics consulted for possible I&D and operating room washout.  Hospitalist consulted for admission.  Hypomagnesemia and hypokalemia found and treated     1 or more acute illnesses that pose a threat to life or bodily function.   Discussion with external consultants.  Shared medical decision making was utilized in creating the patients health plan today.    I independently ordered and reviewed each unique test.  I reviewed external records: provider visit note from PCP.  I reviewed external records: provider visit note from outside specialist.   The patients assessment required an independent historian: EMS.  The reason they were needed is important historical information not provided by the patient.  I interpreted the X-rays possible consolidation left lower lobe, mild cardiomegaly, no overt pulmonary edema or pneumothorax.  I interpreted the CT Scan I did not see an obvious PE and per radiology more consolidation than  pneumonia type picture.  CT of the left thigh was not reviewed by this physician but showed infection and abscess surrounding the orthopedic hardware in the left thigh extending into the muscles.  My Independent EKG Interpretation: sinus rhythm, no evidence of arrhythmia and non-specific EKG      ST Segments:Nonspecific ST segments - NO STEMI   Rate: 109  The patient was admitted and I have discussed patient management with the admitting provider.  The management of this patient was discussed with an external consultant.      Exclusion criteria - the patient is NOT to be included for SEP-1 Core Measure due to: May have criteria for sepsis, but does not meet criteria for severe sepsis or septic shock   Critical care procedure note : 30 minutes of critical care time was performed in the emergency department. This was separate from any other procedures listed during the patients emergency department course. The failure to initiate these interventions on an urgent basis would likely have resulted in sudden, clinically significant or life-threatening deterioration in the patients condition.     History     From [facility] due to recent pneumonia started on doxycycline 2 days requiring oxygen as of yesterday and today as well as increased left lateral thigh pain and drainage from previous surgical room wound from ORIF of left femur possibly in January.  Patient denies chest pain or trouble breathing at this time.  Complains of left lateral thigh pain.  Denies fevers          ROS     Review of Systems   Constitutional:  Negative for chills and fever.   HENT:  Negative for congestion and rhinorrhea.    Respiratory:  Negative for cough and shortness of breath.    Cardiovascular:  Negative for chest pain and leg swelling.   Gastrointestinal:  Negative for abdominal pain, diarrhea, nausea and vomiting.   Endocrine: Negative for polydipsia and polyuria.   Genitourinary:  Negative for dysuria, frequency  lbs in 3 days)    METOPROLOL SUCCINATE (TOPROL XL) 50 MG EXTENDED RELEASE TABLET    Take 1 tablet by mouth daily    NICOTINE (NICODERM CQ) 14 MG/24HR    Place 1 patch onto the skin daily as needed (smoking cravings)    ONDANSETRON (ZOFRAN-ODT) 4 MG DISINTEGRATING TABLET    Take 1 tablet by mouth every 6 hours as needed for Nausea or Vomiting    ONDANSETRON (ZOFRAN-ODT) 4 MG DISINTEGRATING TABLET    Take 1 tablet by mouth every 8 hours as needed for Nausea or Vomiting    PANTOPRAZOLE (PROTONIX) 40 MG TABLET    Take 1 tablet by mouth daily    POLYETHYLENE GLYCOL (MIRALAX) 17 G PACK PACKET    Take 17 g by mouth daily    PREGABALIN (LYRICA) 75 MG CAPSULE    Take 1 capsule by mouth 2 times daily.    SENNOSIDES-DOCUSATE SODIUM (SENOKOT-S) 8.6-50 MG TABLET    Take 2 tablets by mouth daily    TAMSULOSIN (FLOMAX) 0.4 MG CAPSULE    Take 1 capsule by mouth daily    TORSEMIDE (DEMADEX) 100 MG TABLET    Take 0.5 tablets by mouth at bedtime    TRAZODONE (DESYREL) 150 MG TABLET    Take 2 tablets by mouth nightly        Results for orders placed or performed during the hospital encounter of 03/23/24   Blood Culture 1    Specimen: Blood   Result Value Ref Range    Special Requests LEFT  Antecubital        Culture PENDING    XR CHEST PORTABLE    Narrative    Chest X-ray portable AP 3/23/2024.    INDICATION: Sepsis.    COMPARISON: 3/21/2024.    TECHNIQUE: AP/PA view of the chest was obtained.    FINDINGS: Low lung volumes.. Borderline cardiomegaly with no interval increase.  Trachea midline. Central vascular congestion is slightly less pronounced.  Improved aeration of the lung bases with persistent airspace opacity of the left  lower lobe and volume loss. No pneumothorax.  Possible left effusion.        Impression    1. Improved aeration of the lung bases with persistent airspace opacity and  volume loss in the left lower lobe that could be related to a combination of  effusion and subsegmental atelectasis although underlying

## 2024-03-23 NOTE — ANESTHESIA POSTPROCEDURE EVALUATION
Department of Anesthesiology  Postprocedure Note    Patient: Ama Chu  MRN: [id number]  YOB: 1950  Date of evaluation: 3/23/2024    Procedure Summary       Date: 03/23/24 Room / Location: [facility] MAIN OR 09 / [facility] MAIN OR    Anesthesia Start: 1645 Anesthesia Stop:     Procedure: LEFT LEG INCISION AND DRAINAGE OF LEFT THIGH ABSCESS, WITH POSSIBLE PLACEMENT OF WOUND VAC (Left: Leg Upper) Diagnosis:       Abscess      (Abscess [L02.91])    Providers: Balaji Gutierrez MD Responsible Provider: Mick Stapleton MD    Anesthesia Type: general ASA Status: 4 - Emergent            Anesthesia Type: No value filed.    Kandi Phase I:      Kandi Phase II:      Anesthesia Post Evaluation    Patient location during evaluation: ICU  Patient participation: complete - patient cannot participate  Level of consciousness: sedated and ventilated  Airway patency: patent  Cardiovascular status: hemodynamically stable  Respiratory status: intubated and ventilator  Hydration status: euvolemic  Comments: BP (!) 136/58   Pulse 92   Temp 98.8 °F (37.1 °C) (Axillary)   Resp 20   Ht 1.727 m (5' 8\")   Wt (!) 156.5 kg (345 lb)   SpO2 100%   BMI 52.46 kg/m²   Bedside report to Dr. Mueller (ICU), perfect serve to Danelle Mcdaniel (Hospitalist).  Family updated.      No notable events documented.

## 2024-03-23 NOTE — ED NOTES
TRANSFER - OUT REPORT:    Verbal report given to CANDICE Moreno on Ama Chu  being transferred to  820 for routine progression of patient care       Report consisted of patient's Situation, Background, Assessment and   Recommendations(SBAR).     Information from the following report(s) ED Encounter Summary, ED SBAR, Intake/Output, and MAR was reviewed with the receiving nurse.    [place] Fall Assessment:    Presents to emergency department  because of falls (Syncope, seizure, or loss of consciousness): No  Age > 70: Yes  Altered Mental Status, Intoxication with alcohol or substance confusion (Disorientation, impaired judgment, poor safety awaremess, or inability to follow instructions): No  Impaired Mobility: Ambulates or transfers with assistive devices or assistance; Unable to ambulate or transer.: Yes             Lines:   Peripheral IV 03/23/24 Right Antecubital (Active)   Site Assessment Clean, dry & intact 03/23/24 1029   Line Status Blood return noted;Flushed;Specimen collected;Normal saline locked 03/23/24 1029   Phlebitis Assessment No symptoms 03/23/24 1029   Infiltration Assessment 0 03/23/24 1029   Dressing Status Clean, dry & intact 03/23/24 1029   Dressing Type Transparent 03/23/24 1029        Opportunity for questions and clarification was provided.      Patient transported with:  O2 @ 2lpm and Becki Carvalho RN  03/23/24 2866

## 2024-03-23 NOTE — CONSULTS
Consult    Patient: Ama Chu MRN: [id number]  SSN: [id number]    YOB: 1950  Age: 73 y.o.  Sex: female      Subjective:      Ama Chu is a 73 y.o. female admitted with periprosthetic infection left distal femur found a fracture of the distal femur with fixation with a retrograde rufus.    She slipped down while getting out of bed at home in the mid January sustained a fracture left distal femur for which she has a surgery by Dr. Braga consisting of internal fixation with a retrograde rufus.  She was seen for follow-up twice in the clinic things are noted to be doing well.  The last one was on February 23, 2024.  Since then she has developed problems with showing up in the emergency room today that swelling and pain in the left thigh for the last several days and now with draining wound.  CT scan done today is reported to show a large abscess surrounding the distal femur more laterally but also extending medially.  New x-rays were not done today.    No past medical history on file.  Past Surgical History:   Procedure Laterality Date    LEG SURGERY Left 1/24/2024    LEFT FEMUR IM NAIL RUFUS INSERTION RETROGRADE performed by Carter Braga MD at [facility] MAIN OR    UPPER GASTROINTESTINAL ENDOSCOPY N/A 1/27/2024    EGD ESOPHAGOGASTRODUODENOSCOPY performed by Sri Shaw MD at [facility] ENDOSCOPY      FAMHX -No history of inflammatory arthritis   Social History     Tobacco Use    Smoking status: Former     Types: Cigarettes    Smokeless tobacco: Never   Substance Use Topics    Alcohol use: Not on file      Current Facility-Administered Medications   Medication Dose Route Frequency Provider Last Rate Last Admin    magnesium sulfate 2000 mg in 50 mL IVPB premix  2,000 mg IntraVENous Once Aydin Montes MD        alcohol 62% (NOZIN) nasal  1 ampule  1 ampule Nasal Once Balaji Gutierrez MD        ceFAZolin (ANCEF) 2000 mg in sterile water 20 mL IV syringe  2,000 mg IntraVENous Q8H Matt  3/23/2024 Yes    CKD (chronic kidney disease) stage 3, GFR 30-59 ml/min (HCC) 3/23/2024 Yes    Iron deficiency anemia 3/23/2024 Yes    Chronic diastolic heart failure (HCC) (Chronic) 3/23/2024 Yes    Overview Signed 3/21/2024 11:48 AM by Rei Fields MD     Formatting of this note might be different from the original.   Last Assessment & Plan:    Formatting of this note might be different from the original.   ECHO 10/2019   The left ventricular systolic function is normal (55-65%).   The right ventricular systolic function is normal.   Grade I (mild) left ventricular diastolic dysfunction present, consistent with impaired relaxation.      Chest is clear on exam, no increase in edema beyond baseline   Continue Torsemide 50 mg daily and Metoprolol XL 25 mg daily   Continue metolazone 5 mg daily as needed for weight gain greater than 2 pounds in 1 day  Last Assessment & Plan:    Formatting of this note might be different from the original.   ECHO 10/2019   The left ventricular systolic function is normal (55-65%).   The right ventricular systolic function is normal.   Grade I (mild) left ventricular diastolic dysfunction present, consistent with impaired relaxation.      Chest is clear on exam, no increase in edema beyond baseline   Continue Torsemide 50 mg daily and Metoprolol XL 25 mg daily   Continue metolazone 5 mg daily as needed for weight gain greater than 2 pounds in 1 day         Cutaneous lupus erythematosus 3/23/2024 Yes    Overview Signed 3/21/2024 11:48 AM by Rei Fields MD     Formatting of this note might be different from the original.   Formatting of this note might be different from the original.       2016 off  cellcept , MTX ( lupus rash)    plaquneil 400 mg    7/24/2023 trial of pred 5 mg bid X 4 weeks        Last Assessment & Plan:     Formatting of this note might be different from the original.    Laboratory data CBC, AST, ALT and creatinine reviewed and reordered for monitoring  Plaquenil.      Disease is controlled.        Discussed with patient I do not feel that she has any inflammation    I feel that she should see her PCP for referral to pain management.  Formatting of this note might be different from the original.      2016 off  cellcept , MTX ( lupus rash)   plaquneil 400 mg   7/24/2023 trial of pred 5 mg bid X 4 weeks       Last Assessment & Plan:    Formatting of this note might be different from the original.   Laboratory data CBC, AST, ALT and creatinine reviewed and reordered for monitoring Plaquenil.     Disease is controlled.       Discussed with patient I do not feel that she has any inflammation   I feel that she should see her PCP for referral to pain management.         Morbid obesity (HCC) (Chronic) 3/23/2024 Yes    Overview Signed 3/21/2024 11:48 AM by Rei Fields MD     Formatting of this note might be different from the original.   Last Assessment & Plan:     Formatting of this note might be different from the original.    BMI counseling: Due to this patient's BMI, I have provided counseling regarding nutrition. and Due to this patient's BMI, I have provided counseling regarding physical activity.  Last Assessment & Plan:    Formatting of this note might be different from the original.   BMI counseling: Due to this patient's BMI, I have provided counseling regarding nutrition. and Due to this patient's BMI, I have provided counseling regarding physical activity.         Hypomagnesemia 3/23/2024 Yes    Hypokalemia 3/23/2024 Yes    Pneumonia 3/23/2024 Yes    Abscess of left thigh 3/23/2024 Yes          Xrays and or studies:    CT scan left femur done today reviewed.  Interpretation deferred to the radiologist and the radiology report is noted.  Plan:     Were taken to the operating room for incision and drainage and irrigation and debridement.  Will most likely put some Stimulan pellets and there.  Will either close of the drains or put a wound VAC .  Procedure

## 2024-03-23 NOTE — PERIOP NOTE
Mag replacement not given in ER due to surgical timing as well as one IV and a very hard stick for further access. Pt. To receive on floor

## 2024-03-23 NOTE — H&P
Hospitalist History and Physical   Admit Date:  3/23/2024 10:10 AM   Name:  Ama Chu   Age:  73 y.o.  Sex:  female  :  1950   MRN:  [id number]   Room:  ER30/30    Presenting/Chief Complaint: Abscess (Left femur - surgical site)     Reason(s) for Admission: Sepsis ([facility]) [A41.9]     History of Present Illness:       Ama Chu is a 73 y.o. female with medical history of rheumatoid arthritis, BMI 52, HFpEF, bedbound status, HTN, depression, cutaneous lupus, CKD, anemia, pneumonia who is evaluated with   Acute onset drainage left lateral thigh. No fever, no pain to area. Has been at [facility]. Not ambulatory. Had recent pneumonia. Seen in ED 3-21-24 s/p rocephin/ azithro for pneumonia. She declined to be admitted. CXR showed bibasilar opacities, small left effusion.       Today CXR similar   CTA chest shows volume loss left hemithorax, left base atelectasis and right base pleural reaction     CT left femur shows distal metaphyseal fracture and large enhancing fluid collection.     Orthopedics consulted       S/p dose vancomycin and zosyn    Blood cultures pending     HR elevated  Normal lactate and WBC count   Afebrile       She has potassium 2.9, mag 1.6        FULL CODE  Daughters Migdalia and Kamryn           Assessment & Plan:     Principal Problem:    Sepsis ([facility])  Plan:   Left thigh abscess:  Admit remote tele   D1 vancomycin, zosyn  Followup blood cultures   NPO per ortho  Hold plavix            Pneumonia  Plan:   Has been on outpatient antibiotics  D1 zosyn, vancomycin   As needed albuterol nebs            Active Problems:    Closed disp comminuted fracture of shaft of left femur with malunion  Plan:   PT, OT when stable             Rheumatoid arthritis ([facility])  Plan:   Holding chronic meds         Hypertension  Plan:   metoprolol        Depression  Plan:   Resume home meds          CKD (chronic kidney disease) stage 3, GFR 30-59 ml/min ([facility])  Plan:   Trend daily BMP             Iron deficiency  2000 mg in 50 mL IVPB premix       Prior to Admit Medications:  Current Outpatient Medications   Medication Instructions    aspirin 81 mg, Oral, DAILY    celecoxib (CELEBREX) 100 mg, Oral, DAILY    clopidogrel (PLAVIX) 75 mg, Oral, DAILY    doxepin (SINEQUAN) 150 mg, Oral, NIGHTLY    doxycycline hyclate (VIBRA-TABS) 100 mg, Oral, 2 TIMES DAILY    ergocalciferol (ERGOCALCIFEROL) 50,000 Units, Oral, WEEKLY    escitalopram (LEXAPRO) 10 mg, Oral, DAILY    etanercept (ENBREL) 25 mg, SubCUTAneous, WEEKLY    ferrous sulfate (IRON 325) 325 mg, Oral, DAILY WITH BREAKFAST    folic acid (FOLVITE) 1 mg, Oral, DAILY    hydroxychloroquine (PLAQUENIL) 200 mg, Oral, 2 TIMES DAILY    LORazepam (ATIVAN) 2 mg, Oral, NIGHTLY    melatonin 3 mg, Oral, EVERY BEDTIME    Methotrexate (PF) (RASUVO) 25 mg, SubCUTAneous, WEEKLY    methylPREDNISolone (MEDROL DOSEPACK) 4 MG tablet Take by mouth as directed on pack    metOLazone (ZAROXOLYN) 5 mg, Oral, DAILY PRN    metoprolol succinate (TOPROL XL) 50 mg, Oral, DAILY    nicotine (NICODERM CQ) 14 MG/24HR 1 patch, TransDERmal, DAILY PRN    ondansetron (ZOFRAN-ODT) 4 mg, Oral, EVERY 6 HOURS PRN    ondansetron (ZOFRAN-ODT) 4 mg, Oral, EVERY 8 HOURS PRN    pantoprazole (PROTONIX) 40 mg, Oral, DAILY    polyethylene glycol (MIRALAX) 17 g, Oral, DAILY    pregabalin (LYRICA) 75 mg, Oral, 2 TIMES DAILY    sennosides-docusate sodium (SENOKOT-S) 8.6-50 MG tablet 2 tablets, Oral, DAILY    tamsulosin (FLOMAX) 0.4 mg, Oral, DAILY    torsemide (DEMADEX) 50 mg, Oral, Nightly    traZODone (DESYREL) 300 mg, Oral, NIGHTLY       I have personally reviewed labs and tests:  Recent Results (from the past 24 hour(s))   Comprehensive Metabolic Panel    Collection Time: 03/23/24 10:42 AM   Result Value Ref Range    Sodium 137 136 - 146 mmol/L    Potassium 2.9 (L) 3.5 - 5.1 mmol/L    Chloride 97 (L) 103 - 113 mmol/L    CO2 38 (H) 21 - 32 mmol/L    Anion Gap 2 2 - 11 mmol/L    Glucose 99 65 - 100 mg/dL    BUN 14 8 - 23 MG/DL  BPM    P-R Interval 177 ms    QRS Duration 93 ms    Q-T Interval 408 ms    QTc Calculation (Bazett) 550 ms    P Axis 66 degrees    R Axis -27 degrees    Diagnosis       Sinus tachycardia  Borderline left axis deviation  Low voltage, precordial leads  Consider anterior infarct  Borderline T abnormalities, inferior leads  Prolonged QT interval     Urinalysis w/Reflex to Micro    Collection Time: 03/23/24  2:05 PM   Result Value Ref Range    Color, UA YELLOW/STRAW      Appearance CLOUDY      Specific Gravity, UA 1.024 (H) 1.001 - 1.023      pH, Urine 5.5 5.0 - 9.0      Protein, UA TRACE (A) NEG mg/dL    Glucose, UA Negative mg/dL    Ketones, Urine TRACE (A) NEG mg/dL    Bilirubin Urine Negative NEG      Blood, Urine Negative NEG      Urobilinogen, Urine 0.2 0.2 - 1.0 EU/dL    Nitrite, Urine Negative NEG      Leukocyte Esterase, Urine TRACE (A) NEG         Recent Labs     03/21/24  1130   COVID19 Not detected       CT FEMUR LEFT W CONTRAST    Result Date: 3/23/2024  CLINICAL HISTORY: Left thigh abscess. TECHNIQUE: CT of the left thigh is obtained with multiplanar acquisition. 100 cc of intravenous [id number]-370 utilized. FINDINGS: Again there is evidence of intramedullary rashad with suggestion of an acute appearing fracture of the distal femoral metaphysis with mild comminution at the fracture site. Multiple distal interlocking screws are noted. Furthermore however a large massive fluid collection noted extending 26 cm craniocaudally by 11 cm mediolaterally by 12 cm anteroposterior oblique. This is further extending into the medial abductor compartment. Furthermore this demonstrates an enhancing rim.     1. Per history patient open reduction internal fixation on 1/24/2024. There distal metaphyseal femoral fracture on the left however appears to be of acute nature with comminution. Clinical correlation is recommended. Intramedullary rashad appears to be in place. However circumferentially this is surrounded by a large enhancing

## 2024-03-23 NOTE — PROGRESS NOTES
Per anesthesia unable to extubated postop, transferred to ICU, critical care consulted, held night meds   Danelle Olivera MD

## 2024-03-23 NOTE — PROGRESS NOTES
Pt arrived from OR orally intubated with a #7.0 ETT and is secured at the 22 cm ang[place] at the lip and on the left side.   Breath sounds are diminished. Trachea is midline.  Chest excursion is  symmetric.  Sub Q  Negative.  Pt placed on mech vent,all alarms are on and audible, safety rales up,resus bag is at the HOB.     VC+ 450mL / 16 bpm / +8 cm H2O / 100%

## 2024-03-23 NOTE — OP NOTE
Operative Note      Patient: Ama Chu  YOB: 1950  MRN: [id number]    Date of Procedure: 3/23/2024    Preop diagnosis: 1 abscess left thigh   2 periprosthetic infection left distal femur status post retrograde rashad fixation of distal femur fracture    Post-Op Diagnosis: Same       Procedure: 1.  Incision and drainage left distal thigh abscess surrounding fixation of the distal femur fracture  2.  Introduction of Stimulan antibiotic beads  3.  Application of wound VAC system    Surgeon(s):  Balaji Gutierrez MD    Assistant:   * No surgical staff found *    Anesthesia: General    Estimated Blood Loss (mL): less than 100     Complications: None    Specimens:   ID Type Source Tests Collected by Time Destination   1 : LEFT THIGH ABSCESS Swab Leg CULTURE, FUNGUS, CULTURE, WOUND Balaji Gutierrez MD 3/23/2024 1728    2 : LEFT THIGH ABSCESS TISSUE Tissue Leg CULTURE, ANAEROBIC, CULTURE, FUNGUS, CULTURE, TISSUE, AFB CULTURE + SMEAR W/RFLX ID FROM CULTURE Balaji Gutierrez MD 3/23/2024 1728        Implants:  Implant Name Type Inv. Item Serial No.  Lot No. LRB No. Used Action   GRAFT BNE SUB 10CC BEAD 25CC CA SULPHATE RAP SET W/ INDIV - [id number]  GRAFT BNE SUB 10CC BEAD 25CC CA SULPHATE RAP SET W/ INDIV  [employer]- [id number] Left 1 Implanted         Drains:   [REMOVED] External Urinary Catheter (Removed)   Site Assessment Clean,dry & intact 03/03/24 1004   Placement Replaced 03/03/24 1004   Securement Method Other (Comment) 03/02/24 1850   Catheter Care Catheter/Wick replaced;Suction Canister/Tubing changed 03/03/24 1004   Perineal Care Yes 03/03/24 1004   Suction 40 mmgHg continuous 03/02/24 1850   Urine Color Kathryn 03/03/24 1004   Urine Appearance Clear 03/03/24 1004   Output (mL) 500 mL 03/03/24 1327       [REMOVED] External Urinary Catheter (Removed)   Site Assessment Clean,dry & intact 03/21/24 1204   Placement Initiated 03/21/24 1204   Suction 40 mmgHg continuous 03/21/24

## 2024-03-24 LAB
ALBUMIN SERPL-MCNC: 1.6 G/DL (ref 3.2–4.6)
ALBUMIN/GLOB SERPL: 0.4 (ref 0.4–1.6)
ALP SERPL-CCNC: 51 U/L (ref 50–136)
ALT SERPL-CCNC: 7 U/L (ref 12–65)
ANION GAP SERPL CALC-SCNC: 3 MMOL/L (ref 2–11)
ANION GAP SERPL CALC-SCNC: 5 MMOL/L (ref 2–11)
ARTERIAL PATENCY WRIST A: POSITIVE
AST SERPL-CCNC: 10 U/L (ref 15–37)
BACTERIA SPEC CULT: NORMAL
BACTERIA SPEC CULT: NORMAL
BASE EXCESS BLD CALC-SCNC: 9 MMOL/L
BASOPHILS # BLD: 0 K/UL (ref 0–0.2)
BASOPHILS NFR BLD: 0 % (ref 0–2)
BDY SITE: ABNORMAL
BILIRUB SERPL-MCNC: 0.4 MG/DL (ref 0.2–1.1)
BUN SERPL-MCNC: 10 MG/DL (ref 8–23)
BUN SERPL-MCNC: 8 MG/DL (ref 8–23)
CALCIUM SERPL-MCNC: 8.3 MG/DL (ref 8.3–10.4)
CALCIUM SERPL-MCNC: 8.5 MG/DL (ref 8.3–10.4)
CHLORIDE SERPL-SCNC: 104 MMOL/L (ref 103–113)
CHLORIDE SERPL-SCNC: 108 MMOL/L (ref 103–113)
CO2 SERPL-SCNC: 31 MMOL/L (ref 21–32)
CO2 SERPL-SCNC: 32 MMOL/L (ref 21–32)
CREAT SERPL-MCNC: 0.5 MG/DL (ref 0.6–1)
CREAT SERPL-MCNC: 0.5 MG/DL (ref 0.6–1)
DIFFERENTIAL METHOD BLD: ABNORMAL
EKG ATRIAL RATE: 111 BPM
EKG ATRIAL RATE: 79 BPM
EKG DIAGNOSIS: NORMAL
EKG DIAGNOSIS: NORMAL
EKG P AXIS: 26 DEGREES
EKG P AXIS: 66 DEGREES
EKG P-R INTERVAL: 177 MS
EKG P-R INTERVAL: 184 MS
EKG Q-T INTERVAL: 408 MS
EKG Q-T INTERVAL: 426 MS
EKG QRS DURATION: 93 MS
EKG QRS DURATION: 94 MS
EKG QTC CALCULATION (BAZETT): 488 MS
EKG QTC CALCULATION (BAZETT): 550 MS
EKG R AXIS: -18 DEGREES
EKG R AXIS: -27 DEGREES
EKG T AXIS: 15 DEGREES
EKG VENTRICULAR RATE: 109 BPM
EKG VENTRICULAR RATE: 79 BPM
EOSINOPHIL # BLD: 0 K/UL (ref 0–0.8)
EOSINOPHIL NFR BLD: 0 % (ref 0.5–7.8)
ERYTHROCYTE [DISTWIDTH] IN BLOOD BY AUTOMATED COUNT: 18.6 % (ref 11.9–14.6)
GAS FLOW.O2 O2 DELIVERY SYS: ABNORMAL
GLOBULIN SER CALC-MCNC: 4.3 G/DL (ref 2.8–4.5)
GLUCOSE SERPL-MCNC: 109 MG/DL (ref 65–100)
GLUCOSE SERPL-MCNC: 81 MG/DL (ref 65–100)
HCO3 BLD-SCNC: 32.1 MMOL/L (ref 22–26)
HCT VFR BLD AUTO: 24 % (ref 35.8–46.3)
HCT VFR BLD AUTO: 26.4 % (ref 35.8–46.3)
HGB BLD-MCNC: 7.2 G/DL (ref 11.7–15.4)
HGB BLD-MCNC: 7.7 G/DL (ref 11.7–15.4)
IMM GRANULOCYTES # BLD AUTO: 0 K/UL (ref 0–0.5)
IMM GRANULOCYTES NFR BLD AUTO: 1 % (ref 0–5)
INSPIRATION.DURATION SETTING TIME VENT: 0.8 SEC
IPAP/PIP/HIGH PEEP: 25
LYMPHOCYTES # BLD: 0.4 K/UL (ref 0.5–4.6)
LYMPHOCYTES NFR BLD: 6 % (ref 13–44)
MAGNESIUM SERPL-MCNC: 1.5 MG/DL (ref 1.8–2.4)
MCH RBC QN AUTO: 28.2 PG (ref 26.1–32.9)
MCHC RBC AUTO-ENTMCNC: 30 G/DL (ref 31.4–35)
MCV RBC AUTO: 94.1 FL (ref 82–102)
MONOCYTES # BLD: 0.5 K/UL (ref 0.1–1.3)
MONOCYTES NFR BLD: 7 % (ref 4–12)
NEUTS SEG # BLD: 5.7 K/UL (ref 1.7–8.2)
NEUTS SEG NFR BLD: 87 % (ref 43–78)
NRBC # BLD: 0 K/UL (ref 0–0.2)
O2/TOTAL GAS SETTING VFR VENT: 45 %
PAW @ MEAN EXP FLOW ON VENT: 12 CMH2O
PCO2 BLD: 36.8 MMHG (ref 35–45)
PEEP RESPIRATORY: 8 CMH2O
PH BLD: 7.55 (ref 7.35–7.45)
PLATELET # BLD AUTO: 200 K/UL (ref 150–450)
PMV BLD AUTO: 10.4 FL (ref 9.4–12.3)
PO2 BLD: 131 MMHG (ref 75–100)
POTASSIUM SERPL-SCNC: 3 MMOL/L (ref 3.5–5.1)
POTASSIUM SERPL-SCNC: 4 MMOL/L (ref 3.5–5.1)
PROT SERPL-MCNC: 5.9 G/DL (ref 6.3–8.2)
RBC # BLD AUTO: 2.55 M/UL (ref 4.05–5.2)
RESPIRATORY RATE, POC: 16 (ref 5–40)
SAO2 % BLD: 99.3 % (ref 95–98)
SERVICE CMNT-IMP: ABNORMAL
SERVICE CMNT-IMP: ABNORMAL
SERVICE CMNT-IMP: NORMAL
SERVICE CMNT-IMP: NORMAL
SODIUM SERPL-SCNC: 141 MMOL/L (ref 136–146)
SODIUM SERPL-SCNC: 142 MMOL/L (ref 136–146)
SPECIMEN TYPE: ABNORMAL
VENTILATION MODE VENT: ABNORMAL
VT SETTING VENT: 450 ML
WBC # BLD AUTO: 6.6 K/UL (ref 4.3–11.1)

## 2024-03-24 PROCEDURE — 36600 WITHDRAWAL OF ARTERIAL BLOOD: CPT

## 2024-03-24 PROCEDURE — 94003 VENT MGMT INPAT SUBQ DAY: CPT

## 2024-03-24 PROCEDURE — 6360000002 HC RX W HCPCS: Performed by: INTERNAL MEDICINE

## 2024-03-24 PROCEDURE — 2580000003 HC RX 258: Performed by: ORTHOPAEDIC SURGERY

## 2024-03-24 PROCEDURE — 93005 ELECTROCARDIOGRAM TRACING: CPT | Performed by: INTERNAL MEDICINE

## 2024-03-24 PROCEDURE — 51702 INSERT TEMP BLADDER CATH: CPT

## 2024-03-24 PROCEDURE — 99223 1ST HOSP IP/OBS HIGH 75: CPT | Performed by: INTERNAL MEDICINE

## 2024-03-24 PROCEDURE — 6370000000 HC RX 637 (ALT 250 FOR IP): Performed by: INTERNAL MEDICINE

## 2024-03-24 PROCEDURE — 36415 COLL VENOUS BLD VENIPUNCTURE: CPT

## 2024-03-24 PROCEDURE — 6360000002 HC RX W HCPCS: Performed by: ORTHOPAEDIC SURGERY

## 2024-03-24 PROCEDURE — 2500000003 HC RX 250 WO HCPCS: Performed by: INTERNAL MEDICINE

## 2024-03-24 PROCEDURE — 83735 ASSAY OF MAGNESIUM: CPT

## 2024-03-24 PROCEDURE — 85014 HEMATOCRIT: CPT

## 2024-03-24 PROCEDURE — 1100000000 HC RM PRIVATE

## 2024-03-24 PROCEDURE — 80053 COMPREHEN METABOLIC PANEL: CPT

## 2024-03-24 PROCEDURE — 82803 BLOOD GASES ANY COMBINATION: CPT

## 2024-03-24 PROCEDURE — 93010 ELECTROCARDIOGRAM REPORT: CPT | Performed by: INTERNAL MEDICINE

## 2024-03-24 PROCEDURE — 2580000003 HC RX 258: Performed by: INTERNAL MEDICINE

## 2024-03-24 PROCEDURE — C9113 INJ PANTOPRAZOLE SODIUM, VIA: HCPCS | Performed by: INTERNAL MEDICINE

## 2024-03-24 PROCEDURE — 2700000000 HC OXYGEN THERAPY PER DAY

## 2024-03-24 PROCEDURE — 85018 HEMOGLOBIN: CPT

## 2024-03-24 PROCEDURE — 2000000000 HC ICU R&B

## 2024-03-24 PROCEDURE — 85025 COMPLETE CBC W/AUTO DIFF WBC: CPT

## 2024-03-24 RX ADMIN — VANCOMYCIN HYDROCHLORIDE 1750 MG: 10 INJECTION, POWDER, LYOPHILIZED, FOR SOLUTION INTRAVENOUS at 16:41

## 2024-03-24 RX ADMIN — PIPERACILLIN AND TAZOBACTAM 4500 MG: 4; .5 INJECTION, POWDER, FOR SOLUTION INTRAVENOUS at 10:12

## 2024-03-24 RX ADMIN — SODIUM CHLORIDE, PRESERVATIVE FREE 10 ML: 5 INJECTION INTRAVENOUS at 20:22

## 2024-03-24 RX ADMIN — VANCOMYCIN HYDROCHLORIDE 1500 MG: 10 INJECTION, POWDER, LYOPHILIZED, FOR SOLUTION INTRAVENOUS at 05:16

## 2024-03-24 RX ADMIN — SODIUM CHLORIDE: 900 INJECTION, SOLUTION INTRAVENOUS at 03:13

## 2024-03-24 RX ADMIN — PROPOFOL 30 MCG/KG/MIN: 10 INJECTION, EMULSION INTRAVENOUS at 07:58

## 2024-03-24 RX ADMIN — MAGNESIUM SULFATE HEPTAHYDRATE 2000 MG: 40 INJECTION, SOLUTION INTRAVENOUS at 03:33

## 2024-03-24 RX ADMIN — PROPOFOL 35 MCG/KG/MIN: 10 INJECTION, EMULSION INTRAVENOUS at 05:03

## 2024-03-24 RX ADMIN — DOXYCYCLINE 100 MG: 100 INJECTION, POWDER, LYOPHILIZED, FOR SOLUTION INTRAVENOUS at 08:30

## 2024-03-24 RX ADMIN — SODIUM CHLORIDE, PRESERVATIVE FREE 40 MG: 5 INJECTION INTRAVENOUS at 08:52

## 2024-03-24 RX ADMIN — DOXYCYCLINE 100 MG: 100 INJECTION, POWDER, LYOPHILIZED, FOR SOLUTION INTRAVENOUS at 20:18

## 2024-03-24 RX ADMIN — SODIUM CHLORIDE, PRESERVATIVE FREE 10 ML: 5 INJECTION INTRAVENOUS at 08:55

## 2024-03-24 RX ADMIN — PROPOFOL 35 MCG/KG/MIN: 10 INJECTION, EMULSION INTRAVENOUS at 01:46

## 2024-03-24 RX ADMIN — SODIUM CHLORIDE: 9 INJECTION, SOLUTION INTRAVENOUS at 00:54

## 2024-03-24 RX ADMIN — Medication 3 MG: at 20:18

## 2024-03-24 RX ADMIN — PIPERACILLIN AND TAZOBACTAM 4500 MG: 4; .5 INJECTION, POWDER, FOR SOLUTION INTRAVENOUS at 00:55

## 2024-03-24 RX ADMIN — PIPERACILLIN AND TAZOBACTAM 4500 MG: 4; .5 INJECTION, POWDER, FOR SOLUTION INTRAVENOUS at 16:38

## 2024-03-24 ASSESSMENT — PAIN SCALES - GENERAL
PAINLEVEL_OUTOF10: 0

## 2024-03-24 ASSESSMENT — PULMONARY FUNCTION TESTS
PIF_VALUE: 31
PIF_VALUE: 25
PIF_VALUE: 31

## 2024-03-24 NOTE — PROGRESS NOTES
Pt extubated to 4L NC    Hoarse voice / a/o 3x, following commands     Breath sounds even and unlabored     Family at bedside / MD aware

## 2024-03-24 NOTE — CONSULTS
(NICODERM CQ) 14 MG/24HR 1 patch, TransDERmal, DAILY PRN    ondansetron (ZOFRAN-ODT) 4 mg, Oral, EVERY 6 HOURS PRN    ondansetron (ZOFRAN-ODT) 4 mg, Oral, EVERY 8 HOURS PRN    pantoprazole (PROTONIX) 40 mg, Oral, DAILY    polyethylene glycol (MIRALAX) 17 g, Oral, DAILY    pregabalin (LYRICA) 75 mg, Oral, 2 TIMES DAILY    sennosides-docusate sodium (SENOKOT-S) 8.6-50 MG tablet 2 tablets, Oral, DAILY    tamsulosin (FLOMAX) 0.4 mg, Oral, DAILY    torsemide (DEMADEX) 50 mg, Oral, Nightly    traZODone (DESYREL) 300 mg, Oral, NIGHTLY      History reviewed. No pertinent past medical history.  Past Surgical History:   Procedure Laterality Date    LEG SURGERY Left 1/24/2024    LEFT FEMUR IM NAIL RUFUS INSERTION RETROGRADE performed by Carter Braga MD at [facility] MAIN OR    UPPER GASTROINTESTINAL ENDOSCOPY N/A 1/27/2024    EGD ESOPHAGOGASTRODUODENOSCOPY performed by Sri Shaw MD at [facility] ENDOSCOPY     Social History     Socioeconomic History    Marital status:      Spouse name: Not on file    Number of children: Not on file    Years of education: Not on file    Highest education level: Not on file   Occupational History    Not on file   Tobacco Use    Smoking status: Former     Types: Cigarettes    Smokeless tobacco: Never   Substance and Sexual Activity    Alcohol use: Not on file    Drug use: Not on file    Sexual activity: Not on file   Other Topics Concern    Not on file   Social History Narrative    Not on file     Social Determinants of Health     Financial Resource Strain: Not on file   Food Insecurity: No Food Insecurity (2/29/2024)    Hunger Vital Sign     Worried About Running Out of Food in the Last Year: Never true     Ran Out of Food in the Last Year: Never true   Transportation Needs: No Transportation Needs (2/29/2024)    PRAPARE - Transportation     Lack of Transportation (Medical): No     Lack of Transportation (Non-Medical): No   Physical Activity: Not on file   Stress: Not on file   Social  Connections: Not on file   Intimate Partner Violence: Not on file   Housing Stability: Low Risk  (2/29/2024)    Housing Stability Vital Sign     Unable to Pay for Housing in the Last Year: No     Number of Places Lived in the Last Year: 1     Unstable Housing in the Last Year: No     No family history on file.  No Known Allergies  Objective:   Blood pressure (!) 88/50, pulse 73, temperature 98.2 °F (36.8 °C), temperature source Axillary, resp. rate 16, height 1.727 m (5' 8\"), weight (!) 156.5 kg (345 lb), SpO2 97 %.   Intake/Output Summary (Last 24 hours) at 3/24/2024 0916  Last data filed at 3/24/2024 0635  Gross per 24 hour   Intake 3159.6 ml   Output 665 ml   Net 2494.6 ml       Ventilator Settings:  Ideal body weight: 63.9 kg (140 lb 14 oz)  Adjusted ideal body weight: 100.9 kg (222 lb 8.4 oz)  Mode FIO2 Rate Tidal Volume Pressure   SIMV/PRVC    35 %  16 bpm         8     Peak airway pressure:     Minute ventilation:      PHYSICAL EXAM   Constitutional:  the patient is well developed, obese and in no acute distress  EENMT:  Sclera clear, pupils equal, oral mucosa moist  Respiratory: symmetric chest rise.  Clear to auscultation  Cardiovascular:  RRR without M,G,R. There is no lower extremity edema.  Gastrointestinal: soft and non-tender; with positive bowel sounds.  Musculoskeletal: warm without cyanosis. Normal muscle tone.  Very large legs  Skin:  no jaundice or rashes, surgical drain knee area.    Neurologic: symmetric strength, fluent speech  Psychiatric:  calm, appropriate, oriented x 4    Imaging: I performed an independent interpretation of the patient's images.  CXR:  ETT noted and mild congestion Rt base          Recent Labs     03/21/24  1130 03/23/24  1042 03/24/24  0102   WBC 7.7 7.1 6.6   HGB 8.7* 9.6* 7.2*   HCT 29.3* 33.1* 24.0*    260 200    137 141   K 3.4* 2.9* 4.0   CL 98* 97* 104   CO2 38* 38* 32   BUN 18 14 10   CREATININE 1.00 0.80 0.50*   MG  --  1.6* 1.5*   BILITOT 0.6 0.4  --

## 2024-03-24 NOTE — PROGRESS NOTES
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

## 2024-03-24 NOTE — CONSULTS
Infectious Disease Consult    Today's Date: 3/24/2024   Admit Date: 3/23/2024  1950  Chief Complaint:    Impression:   Left thigh abscess- s/p I&D. Cultures pending  Cutaneous lupus - on Embrel, methotrexate, prednisone and Plaquinil. Immunosuppression may complicate healing.   Chronic congestive heart failure.   CKD    Plan:    Continue vancomycin and Zosyn pending culture results.     Anti-infectives:   Vancomycin (4/23-  )  Zosyn (4/23-  )  Doxycycline (4/23-  )    Subjective:   Date of Consultation:  March 24, 2024  Referring Physician: Dr. Nixon/ Hospitalists    Patient is a 73 y.o. female seen for left thigh abscess. She had ORIF and rashad placement in January following a femoral fracture. Recently, she developed pain, swelling, and drainage from the left thigh. She was admitted to the hospital yesterday and had an I&D last night. Cultures are pending. Presently on vancomycin and Zosyn. Also on doxycyline, I believe, for suspected COPD exacerbation. She had and episode of discitis and Corynebacterium striatum bacteremia in 2021 from which she fully recovered. Also has cutaneous lupus and is on chronic immunosuppressives.     Patient Active Problem List   Diagnosis    Closed disp comminuted fracture of shaft of left femur with malunion    Rheumatoid arthritis (HCC)    Hypertension    Depression    Neuropathy    CKD (chronic kidney disease) stage 3, GFR 30-59 ml/min ([facility])    Iron deficiency anemia    Weakness of right upper extremity    Acute osteomyelitis of lumbar spine (HCC)    Acute respiratory failure with hypercapnia ([facility])    Anxiety    Chronic diastolic heart failure (HCC)    Cutaneous lupus erythematosus    Discitis of lumbar region    Morbid obesity (HCC)    MEET (obstructive sleep apnea)    Plantar fasciitis, left    Sepsis ([facility])    Hypomagnesemia    Hypokalemia    Pneumonia    Abscess of left thigh    Abscess     History reviewed. No pertinent past medical history.   No family history on file.  Take 0.5 tablets by mouth at bedtime 1/30/24   Felicita Munoz MD   doxepin (SINEQUAN) 100 MG capsule Take 150 mg by mouth nightly    Anita Vee MD   ergocalciferol (ERGOCALCIFEROL) 1.25 MG (15372 UT) capsule Take 1 capsule by mouth once a week    Anita Vee MD   escitalopram (LEXAPRO) 10 MG tablet Take 1 tablet by mouth daily    Anita Vee MD   ferrous sulfate (IRON 325) 325 (65 Fe) MG tablet Take 1 tablet by mouth daily (with breakfast)    Anita Vee MD   hydroxychloroquine (PLAQUENIL) 200 MG tablet Take 1 tablet by mouth 2 times daily    Anita Vee MD   metOLazone (ZAROXOLYN) 5 MG tablet Take 1 tablet by mouth daily as needed (for weight gain more than 2 lbs in 3 days)    Anita Vee MD   metoprolol succinate (TOPROL XL) 50 MG extended release tablet Take 1 tablet by mouth daily    Anita Vee MD   tamsulosin (FLOMAX) 0.4 MG capsule Take 1 capsule by mouth daily    Anita Vee MD   traZODone (DESYREL) 150 MG tablet Take 2 tablets by mouth nightly    Anita Vee MD   pregabalin (LYRICA) 75 MG capsule Take 1 capsule by mouth 2 times daily.    Anita Vee MD   etanercept (ENBREL) 50 MG/ML injection Inject 0.5 mLs into the skin once a week    Anita Vee MD   Methotrexate, PF, (RASUVO) 25 MG/0.5ML chemo injection pen Inject 25 mg into the skin once a week    Anita Vee MD   LORazepam (ATIVAN) 2 MG tablet Take 1 tablet by mouth nightly.    Anita Vee MD   folic acid (FOLVITE) 1 MG tablet Take 1 tablet by mouth daily    Anita Vee MD   melatonin 3 MG TABS tablet Take 1 tablet by mouth nightly    Anita Vee MD       No Known Allergies     Review of Systems:  A comprehensive review of systems was negative except for that written in the History of Present Illness.    Objective:     Visit Vitals  /62   Pulse 92   Temp 98.2 °F (36.8 °C) (Axillary)   Resp 22   Ht

## 2024-03-24 NOTE — PROGRESS NOTES
VANCO NOTE    [facility]   Pharmacy Pharmacokinetic Monitoring Service - Vancomycin    Consulting Provider: Balaji Gutierrez MD    Indication: Surgical Site Infection  Target Concentration: Goal AUC/HIRAL 400-600 mg*hr/L  Day of Therapy: 1   Additional Antimicrobials: pip/tazo    Patient eligible for piperacillin-tazobactam to cefepime auto-substitution per P&T approved protocol? N/A    Pertinent Laboratory Values:   Wt Readings from Last 1 Encounters:   03/23/24 (!) 156.5 kg (345 lb)     Temp Readings from Last 1 Encounters:   03/24/24 97.8 °F (36.6 °C) (Axillary)     Recent Labs     03/21/24  1130 03/23/24  1042 03/24/24  0102   BUN 18 14 10   CREATININE 1.00 0.80 0.50*   WBC 7.7 7.1 6.6   PROCAL  --  0.20  --      Estimated Creatinine Clearance: 162 mL/min (A) (based on SCr of 0.5 mg/dL (L)).    Lab Results   Component Value Date/Time    VANCORANDOM 14.1 01/30/2024 04:18 AM       MRSA Nasal Swab: N/A. Non-respiratory infection    Assessment:  Date/Time Dose Concentration AUC         Note: Serum concentrations collected for AUC dosing may appear elevated if collected in close proximity to the dose administered, this is not necessarily an indication of toxicity    Plan:  Dosing recommendations based on Bayesian software and patient specific kinetics  Start vancomycin 2500mg IV x 1 then 1500mg IV q12h  Anticipated AUC of 467 and trough concentration of 14.1 at steady state  Renal labs as indicated   Vancomycin concentrations will be ordered as clinically appropriate   Pharmacy will continue to monitor patient and adjust therapy as indicated    Thank you for the consult,  Rebecca Everett [facility]

## 2024-03-24 NOTE — PROGRESS NOTES
Occupational Therapy Note:    Attempted to see patient this AM for occupational therapy evaluation session. Patient remain intubated post-operatively so evaluation held. Will follow and re-attempt as schedule permits/patient available. Thank you,    Leyla Avalos, OT    Rehab Caseload Tracker

## 2024-03-24 NOTE — PROGRESS NOTES
VANCO DAILY FOLLOW UP NOTE  [facility]   Pharmacy Pharmacokinetic Monitoring Service - Vancomycin    Consulting Provider: Dr. Gutierrez   Indication: surgical site infection  Target Concentration: Goal AUC/HIRAL 400-600 mg*hr/L  Day of Therapy: 2  Additional Antimicrobials: doxycycline, pip/tazo    Pertinent Laboratory Values:   Wt Readings from Last 1 Encounters:   03/23/24 (!) 156.5 kg (345 lb)     Temp Readings from Last 1 Encounters:   03/24/24 98.2 °F (36.8 °C) (Axillary)     Recent Labs     03/21/24  1130 03/21/24  1429 03/23/24  1042 03/24/24  0102   BUN 18  --  14 10   CREATININE 1.00  --  0.80 0.50*   WBC 7.7  --  7.1 6.6   PROCAL  --   --  0.20  --    LACTSEPSIS 1.3 1.4 1.6  --      Estimated Creatinine Clearance: 162 mL/min (A) (based on SCr of 0.5 mg/dL (L)).    Lab Results   Component Value Date/Time    VANCORANDOM 14.1 01/30/2024 04:18 AM     MRSA Nasal Swab: N/A. Non-respiratory infection    Assessment:  Date/Time Dose Concentration AUC         Note: Serum concentrations collected for AUC dosing may appear elevated if collected in close proximity to the dose administered, this is not necessarily an indication of toxicity    Plan:  Dosing recommendations based on Bayesian software  Increase to vancomycin 1750 mg every 12 hours   Anticipated AUC of 530 and trough concentration of 16.1 at steady state  Renal labs as indicated   Vancomycin concentrations will be ordered as clinically appropriate   Pharmacy will continue to monitor patient and adjust therapy as indicated    Thank you for the consult,  Zohra Erazo [facility]

## 2024-03-24 NOTE — PROGRESS NOTES
[facility]        March 24, 2024         Post Op day: 1 Day Post-Op Procedure(s) (LRB):  LEFT LEG INCISION AND DRAINAGE OF LEFT THIGH ABSCESS, WITH PLACEMENT OF WOUND VAC (Left)      Admit Date: 3/23/2024  Admit Diagnosis: Septicemia (HCC) [A41.9]  Abscess of left thigh [L02.416]  Sepsis (HCC) [A41.9]  Pneumonia of left lower lobe due to infectious organism [J18.9]  Infection associated with internal fixation device, unspecified bone, initial encounter ([facility]) [T84.60XA]  Abscess [L02.91]       Principle Problem: Sepsis (HCC).           Subjective: In ICU on ventilator     Objective:   Wound VAC left thigh is working     Assessment / Plan :  Patient Active Problem List   Diagnosis    Closed disp comminuted fracture of shaft of left femur with malunion    Rheumatoid arthritis (HCC)    Hypertension    Depression    Neuropathy    CKD (chronic kidney disease) stage 3, GFR 30-59 ml/min (HCC)    Iron deficiency anemia    Weakness of right upper extremity    Acute osteomyelitis of lumbar spine (HCC)    Acute respiratory failure with hypercapnia (HCC)    Anxiety    Chronic diastolic heart failure (HCC)    Cutaneous lupus erythematosus    Discitis of lumbar region    Morbid obesity (HCC)    MEET (obstructive sleep apnea)    Plantar fasciitis, left    Sepsis (HCC)    Hypomagnesemia    Hypokalemia    Pneumonia    Abscess of left thigh    Abscess    Patient Vitals for the past 8 hrs:   BP Temp Temp src Pulse Resp SpO2   03/24/24 0800 (!) 88/50 -- -- 73 16 97 %   03/24/24 0745 (!) 93/46 -- -- 75 16 95 %   03/24/24 0730 (!) 119/56 -- -- 90 30 95 %   03/24/24 0715 (!) 119/59 -- -- 79 15 98 %   03/24/24 0700 (!) 95/50 98.2 °F (36.8 °C) Axillary 74 16 100 %   03/24/24 0630 (!) 86/48 -- -- 72 16 99 %   03/24/24 0615 (!) 83/46 -- -- 75 16 99 %   03/24/24 0600 (!) 84/45 -- -- 79 16 99 %   03/24/24 0545 (!) 89/50 -- -- 84 16 99 %   03/24/24 0530 (!) 89/48 -- -- 85 16 97 %   03/24/24 0515 (!) 148/64 -- -- 90 (!) 49 100 %    24 0500 (!) 113/57 -- -- 79 26 97 %   24 0445 (!) 99/51 -- -- 72 (!) 41 98 %   24 0430 (!) 102/56 -- -- 74 22 99 %   24 0415 (!) 96/51 -- -- 70 (!) 43 100 %   24 0400 (!) 93/49 -- -- 71 (!) 57 100 %   24 0345 (!) 95/49 -- -- 72 (!) 43 100 %   24 0330 (!) 96/49 -- -- 72 (!) 39 100 %   24 0315 (!) 100/51 -- -- 73 27 100 %   24 0300 (!) 97/51 97.8 °F (36.6 °C) Axillary 74 29 100 %   24 0245 (!) 99/51 -- -- 75 30 100 %   24 0230 (!) 98/51 -- -- 75 (!) 34 100 %   24 0215 (!) 98/50 -- -- 75 (!) 44 100 %   24 0200 (!) 97/51 -- -- 77 (!) 34 100 %    Temp (24hrs), Av.3 °F (36.8 °C), Min:97.8 °F (36.6 °C), Max:99.4 °F (37.4 °C)    Body mass index is 52.46 kg/m².    Lab Results   Component Value Date/Time    HGB 7.2 2024 01:02 AM          S/P Procedure(s) (LRB):  LEFT LEG INCISION AND DRAINAGE OF LEFT THIGH ABSCESS, WITH PLACEMENT OF WOUND VAC (Left)        Medical Mgmt per hospitalist  Wound VAC to be changed .  Antibiotics per infectious disease specialist.  DC disp: Not known  As of tomorrow patient to be followed by Dr. Braga/Mr. Padron to decide further disposition         Signed By: Balaji Gutierrez MD  3/24/2024,  9:55 AM

## 2024-03-24 NOTE — PROGRESS NOTES
Hospitalist progress note   Admit Date:  3/23/2024 10:10 AM   Name:  Ama Chu   Age:  73 y.o.  Sex:  female  :  1950   MRN:  [id number]   Room:      Presenting/Chief Complaint: Abscess (Left femur - surgical site)     Reason(s) for Admission: Septicemia (HCC) [A41.9]  Abscess of left thigh [L02.416]  Sepsis (HCC) [A41.9]  Pneumonia of left lower lobe due to infectious organism [J18.9]  Infection associated with internal fixation device, unspecified bone, initial encounter ([facility]) [T84.60XA]  Abscess [L02.91]     Hospital course:       Ama Chu is a 73 y.o. female with medical history of rheumatoid arthritis, BMI 52, HFpEF, bedbound status, HTN, depression, cutaneous lupus, CKD, anemia, pneumonia who is evaluated with   Acute onset drainage left lateral thigh. No fever, no pain to area. Has been at [facility]. Not ambulatory. Had recent pneumonia. Seen in ED 3-21-24 s/p rocephin/ azithro for pneumonia. She declined to be admitted. CXR showed bibasilar opacities, small left effusion.       Today CXR similar   CTA chest shows volume loss left hemithorax, left base atelectasis and right base pleural reaction     CT left femur shows distal metaphyseal fracture and large enhancing fluid collection.     Orthopedics consulted       S/p dose vancomycin and zosyn    Blood cultures pending     HR elevated  Normal lactate and WBC count   Afebrile       She has potassium 2.9, mag 1.6      Subjective:   This morning patient sedated and intubated.      Assessment & Plan:       Sepsis     Left thigh abscess  Status post I&D  Continue broad-spectrum antibiotics  Follow cultures  Pain management  Orthopedic surgery recommendations      Pneumonia    Hypoxic respiratory failure postop   Continue antibiotics  Currently on a ventilator  Bronchodilators as needed  Pulmonary recommendations appreciated      Hypertension  Metoprolol      Depression  Continue home meds      CKD (chronic kidney disease) stage 3, GFR  affect.      Orders Placed This Encounter   Medications    lactated ringers bolus 1,917 mL    piperacillin-tazobactam (ZOSYN) 4,500 mg in sodium chloride 0.9 % 100 mL IVPB (mini-bag)     Order Specific Question:   Antimicrobial Indications     Answer:   Other     Order Specific Question:   Other Abx Indication     Answer:   Suspected Sepsis of Skin or Soft Tissue Origin    vancomycin (VANCOCIN) 2500 mg in sodium chloride 0.9 % 500 mL IVPB     Order Specific Question:   Antimicrobial Indications     Answer:   Other     Order Specific Question:   Other Abx Indication     Answer:   Suspected Sepsis of Skin or Soft Tissue Origin    HYDROmorphone HCl PF (DILAUDID) injection 0.5 mg    ondansetron (ZOFRAN) injection 4 mg    ipratropium 0.5 mg-albuterol 2.5 mg (DUONEB) nebulizer solution 1 Dose     Order Specific Question:   Initiate RT Bronchodilator Protocol     Answer:   Yes - Inpatient Protocol    potassium chloride (KLOR-CON M) extended release tablet 40 mEq    iopamidol (ISOVUE-370) 76 % injection 100 mL    magnesium sulfate 2000 mg in 50 mL IVPB premix    sodium chloride flush 0.9 % injection 5-40 mL    sodium chloride flush 0.9 % injection 5-40 mL    0.9 % sodium chloride infusion    OR Linked Order Group     potassium chloride (KLOR-CON M) extended release tablet 40 mEq     potassium bicarb-citric acid (EFFER-K) effervescent tablet 40 mEq     potassium chloride 10 mEq/100 mL IVPB (Peripheral Line)    magnesium sulfate 2000 mg in 50 mL IVPB premix    polyethylene glycol (GLYCOLAX) packet 17 g    OR Linked Order Group     acetaminophen (TYLENOL) tablet 650 mg     acetaminophen (TYLENOL) suppository 650 mg    tuberculin injection 5 Units    piperacillin-tazobactam (ZOSYN) 4,500 mg in sodium chloride 0.9 % 100 mL IVPB (mini-bag)     Order Specific Question:   Antimicrobial Indications     Answer:   Skin and Soft Tissue Infection     Order Specific Question:   Skin duration of therapy     Answer:   7 days    alcohol 62%  (NOZIN) nasal  1 ampule    DISCONTD: ceFAZolin (ANCEF) 2000 mg in sterile water 20 mL IV syringe     Order Specific Question:   Antimicrobial Indications     Answer:   Surgical Site Infection    escitalopram (LEXAPRO) tablet 10 mg    ferrous sulfate (IRON 325) tablet 325 mg    folic acid (FOLVITE) tablet 1 mg    melatonin tablet 3 mg    metoprolol succinate (TOPROL XL) extended release tablet 12.5 mg    DISCONTD: pantoprazole (PROTONIX) tablet 40 mg    pregabalin (LYRICA) capsule 75 mg    tamsulosin (FLOMAX) capsule 0.4 mg    traZODone (DESYREL) tablet 300 mg    doxepin (SINEQUAN) capsule 150 mg    DISCONTD: albuterol (PROVENTIL) (2.5 MG/3ML) 0.083% nebulizer solution 2.5 mg     Order Specific Question:   Initiate RT Bronchodilator Protocol     Answer:   Yes - Inpatient Protocol    predniSONE (DELTASONE) tablet 5 mg    torsemide (DEMADEX) tablet 100 mg    allopurinol (ZYLOPRIM) tablet 100 mg    DISCONTD: HYDROcodone-acetaminophen (NORCO) 5-325 MG per tablet 1 tablet    DISCONTD: LORazepam (ATIVAN) tablet 1 mg    LORazepam (ATIVAN) tablet 2 mg    lactated ringers IV soln infusion    ceFAZolin (ANCEF) 3000 mg in sterile water 30 mL IV syringe     Order Specific Question:   Antimicrobial Indications     Answer:   Surgical Site Infection     Order Specific Question:   Antimicrobial Indications     Answer:   Surgical Prophylaxis    DISCONTD: naloxone 0.4 mg in 10 mL sodium chloride syringe    DISCONTD: HYDROmorphone HCl PF (DILAUDID) injection 0.25 mg    DISCONTD: oxyCODONE (ROXICODONE) immediate release tablet 5 mg    0.9 % sodium chloride infusion    sodium chloride flush 0.9 % injection 5-40 mL    sodium chloride flush 0.9 % injection 5-40 mL    0.9 % sodium chloride infusion    DISCONTD: ceFAZolin (ANCEF) 3000 mg in sterile water 30 mL IV syringe     Default Settings: Auto-dosed by weight Q8hrs x 2 doses  Auto dosed based on Pt Wt: <119.9 kg=2 gm, >120 kg=3 gm     Order Specific Question:   Antimicrobial

## 2024-03-24 NOTE — PROGRESS NOTES
Physical Therapy Note:    Attempted to see patient this AM for physical therapy evaluation session. Patient remain intubated post-operatively so evaluation held. Will follow and re-attempt as schedule permits/patient available. Thank you,    Radha Mcdaniel, PT     Rehab Caseload Tracker

## 2024-03-25 PROBLEM — R09.02 HYPOXEMIA: Status: ACTIVE | Noted: 2024-03-25

## 2024-03-25 PROBLEM — A41.9 SEPTICEMIA (HCC): Status: ACTIVE | Noted: 2024-03-25

## 2024-03-25 LAB
ANION GAP SERPL CALC-SCNC: 3 MMOL/L (ref 2–11)
BASOPHILS # BLD: 0 K/UL (ref 0–0.2)
BASOPHILS NFR BLD: 0 % (ref 0–2)
BUN SERPL-MCNC: 7 MG/DL (ref 8–23)
CALCIUM SERPL-MCNC: 8.6 MG/DL (ref 8.3–10.4)
CHLORIDE SERPL-SCNC: 109 MMOL/L (ref 103–113)
CO2 SERPL-SCNC: 30 MMOL/L (ref 21–32)
CREAT SERPL-MCNC: 0.5 MG/DL (ref 0.6–1)
DIFFERENTIAL METHOD BLD: ABNORMAL
EOSINOPHIL # BLD: 0 K/UL (ref 0–0.8)
EOSINOPHIL NFR BLD: 0 % (ref 0.5–7.8)
ERYTHROCYTE [DISTWIDTH] IN BLOOD BY AUTOMATED COUNT: 18.6 % (ref 11.9–14.6)
GLUCOSE SERPL-MCNC: 79 MG/DL (ref 65–100)
HCT VFR BLD AUTO: 24 % (ref 35.8–46.3)
HGB BLD-MCNC: 7 G/DL (ref 11.7–15.4)
IMM GRANULOCYTES # BLD AUTO: 0.1 K/UL (ref 0–0.5)
IMM GRANULOCYTES NFR BLD AUTO: 1 % (ref 0–5)
LYMPHOCYTES # BLD: 0.8 K/UL (ref 0.5–4.6)
LYMPHOCYTES NFR BLD: 14 % (ref 13–44)
MAGNESIUM SERPL-MCNC: 1.9 MG/DL (ref 1.8–2.4)
MCH RBC QN AUTO: 27.5 PG (ref 26.1–32.9)
MCHC RBC AUTO-ENTMCNC: 29.2 G/DL (ref 31.4–35)
MCV RBC AUTO: 94.1 FL (ref 82–102)
MONOCYTES # BLD: 0.8 K/UL (ref 0.1–1.3)
MONOCYTES NFR BLD: 13 % (ref 4–12)
NEUTS SEG # BLD: 4.1 K/UL (ref 1.7–8.2)
NEUTS SEG NFR BLD: 72 % (ref 43–78)
NRBC # BLD: 0 K/UL (ref 0–0.2)
PLATELET # BLD AUTO: 230 K/UL (ref 150–450)
PLATELET COMMENT: ABNORMAL
PMV BLD AUTO: 10.1 FL (ref 9.4–12.3)
POTASSIUM SERPL-SCNC: 3 MMOL/L (ref 3.5–5.1)
POTASSIUM SERPL-SCNC: 3.7 MMOL/L (ref 3.5–5.1)
RBC # BLD AUTO: 2.55 M/UL (ref 4.05–5.2)
RBC MORPH BLD: ABNORMAL
SODIUM SERPL-SCNC: 142 MMOL/L (ref 136–146)
WBC # BLD AUTO: 5.8 K/UL (ref 4.3–11.1)
WBC MORPH BLD: ABNORMAL

## 2024-03-25 PROCEDURE — 2580000003 HC RX 258: Performed by: INTERNAL MEDICINE

## 2024-03-25 PROCEDURE — 99232 SBSQ HOSP IP/OBS MODERATE 35: CPT | Performed by: INTERNAL MEDICINE

## 2024-03-25 PROCEDURE — 6370000000 HC RX 637 (ALT 250 FOR IP): Performed by: ORTHOPAEDIC SURGERY

## 2024-03-25 PROCEDURE — 36415 COLL VENOUS BLD VENIPUNCTURE: CPT

## 2024-03-25 PROCEDURE — 6360000002 HC RX W HCPCS: Performed by: INTERNAL MEDICINE

## 2024-03-25 PROCEDURE — 6370000000 HC RX 637 (ALT 250 FOR IP): Performed by: INTERNAL MEDICINE

## 2024-03-25 PROCEDURE — 2500000003 HC RX 250 WO HCPCS: Performed by: INTERNAL MEDICINE

## 2024-03-25 PROCEDURE — 85025 COMPLETE CBC W/AUTO DIFF WBC: CPT

## 2024-03-25 PROCEDURE — 97165 OT EVAL LOW COMPLEX 30 MIN: CPT

## 2024-03-25 PROCEDURE — 2000000000 HC ICU R&B

## 2024-03-25 PROCEDURE — A4216 STERILE WATER/SALINE, 10 ML: HCPCS | Performed by: INTERNAL MEDICINE

## 2024-03-25 PROCEDURE — C9113 INJ PANTOPRAZOLE SODIUM, VIA: HCPCS | Performed by: INTERNAL MEDICINE

## 2024-03-25 PROCEDURE — 97605 NEG PRS WND THER DME<=50SQCM: CPT

## 2024-03-25 PROCEDURE — 2700000000 HC OXYGEN THERAPY PER DAY

## 2024-03-25 PROCEDURE — 84132 ASSAY OF SERUM POTASSIUM: CPT

## 2024-03-25 PROCEDURE — 2580000003 HC RX 258: Performed by: ORTHOPAEDIC SURGERY

## 2024-03-25 PROCEDURE — 97112 NEUROMUSCULAR REEDUCATION: CPT

## 2024-03-25 PROCEDURE — 97530 THERAPEUTIC ACTIVITIES: CPT

## 2024-03-25 PROCEDURE — 83735 ASSAY OF MAGNESIUM: CPT

## 2024-03-25 PROCEDURE — 97161 PT EVAL LOW COMPLEX 20 MIN: CPT

## 2024-03-25 PROCEDURE — 80048 BASIC METABOLIC PNL TOTAL CA: CPT

## 2024-03-25 PROCEDURE — 1100000000 HC RM PRIVATE

## 2024-03-25 RX ORDER — PANTOPRAZOLE SODIUM 40 MG/1
40 TABLET, DELAYED RELEASE ORAL
Status: DISCONTINUED | OUTPATIENT
Start: 2024-03-25 | End: 2024-03-28 | Stop reason: HOSPADM

## 2024-03-25 RX ORDER — HYDROMORPHONE HYDROCHLORIDE 1 MG/ML
0.5 INJECTION, SOLUTION INTRAMUSCULAR; INTRAVENOUS; SUBCUTANEOUS
Status: DISCONTINUED | OUTPATIENT
Start: 2024-03-25 | End: 2024-03-28 | Stop reason: HOSPADM

## 2024-03-25 RX ADMIN — FERROUS SULFATE TAB 325 MG (65 MG ELEMENTAL FE) 325 MG: 325 (65 FE) TAB at 09:45

## 2024-03-25 RX ADMIN — POTASSIUM CHLORIDE 10 MEQ: 7.46 INJECTION, SOLUTION INTRAVENOUS at 04:49

## 2024-03-25 RX ADMIN — ESCITALOPRAM OXALATE 10 MG: 10 TABLET ORAL at 09:45

## 2024-03-25 RX ADMIN — HYDROMORPHONE HYDROCHLORIDE 0.5 MG: 1 INJECTION, SOLUTION INTRAMUSCULAR; INTRAVENOUS; SUBCUTANEOUS at 10:43

## 2024-03-25 RX ADMIN — SODIUM CHLORIDE, PRESERVATIVE FREE 40 MG: 5 INJECTION INTRAVENOUS at 05:39

## 2024-03-25 RX ADMIN — DOXYCYCLINE 100 MG: 100 INJECTION, POWDER, LYOPHILIZED, FOR SOLUTION INTRAVENOUS at 09:52

## 2024-03-25 RX ADMIN — DOXYCYCLINE 100 MG: 100 INJECTION, POWDER, LYOPHILIZED, FOR SOLUTION INTRAVENOUS at 19:55

## 2024-03-25 RX ADMIN — TORSEMIDE 100 MG: 100 TABLET ORAL at 09:45

## 2024-03-25 RX ADMIN — POTASSIUM CHLORIDE 10 MEQ: 7.46 INJECTION, SOLUTION INTRAVENOUS at 06:39

## 2024-03-25 RX ADMIN — FOLIC ACID 1 MG: 1 TABLET ORAL at 09:44

## 2024-03-25 RX ADMIN — TAMSULOSIN HYDROCHLORIDE 0.4 MG: 0.4 CAPSULE ORAL at 09:45

## 2024-03-25 RX ADMIN — Medication 3 MG: at 19:55

## 2024-03-25 RX ADMIN — SODIUM CHLORIDE: 900 INJECTION, SOLUTION INTRAVENOUS at 17:10

## 2024-03-25 RX ADMIN — VANCOMYCIN HYDROCHLORIDE 1750 MG: 10 INJECTION, POWDER, LYOPHILIZED, FOR SOLUTION INTRAVENOUS at 16:52

## 2024-03-25 RX ADMIN — SODIUM CHLORIDE, PRESERVATIVE FREE 10 ML: 5 INJECTION INTRAVENOUS at 09:46

## 2024-03-25 RX ADMIN — ASPIRIN 81 MG: 81 TABLET, COATED ORAL at 09:44

## 2024-03-25 RX ADMIN — SODIUM CHLORIDE, PRESERVATIVE FREE 10 ML: 5 INJECTION INTRAVENOUS at 20:10

## 2024-03-25 RX ADMIN — SODIUM CHLORIDE: 900 INJECTION, SOLUTION INTRAVENOUS at 02:06

## 2024-03-25 RX ADMIN — POTASSIUM CHLORIDE 40 MEQ: 1500 TABLET, EXTENDED RELEASE ORAL at 10:24

## 2024-03-25 RX ADMIN — CEFEPIME 2000 MG: 2 INJECTION, POWDER, FOR SOLUTION INTRAVENOUS at 12:07

## 2024-03-25 RX ADMIN — PIPERACILLIN AND TAZOBACTAM 4500 MG: 4; .5 INJECTION, POWDER, FOR SOLUTION INTRAVENOUS at 00:50

## 2024-03-25 RX ADMIN — CEFEPIME 2000 MG: 2 INJECTION, POWDER, FOR SOLUTION INTRAVENOUS at 19:29

## 2024-03-25 RX ADMIN — OXYCODONE 5 MG: 5 TABLET ORAL at 05:34

## 2024-03-25 RX ADMIN — PREDNISONE 5 MG: 5 TABLET ORAL at 09:45

## 2024-03-25 RX ADMIN — POTASSIUM CHLORIDE 10 MEQ: 7.46 INJECTION, SOLUTION INTRAVENOUS at 05:35

## 2024-03-25 RX ADMIN — PIPERACILLIN AND TAZOBACTAM 4500 MG: 4; .5 INJECTION, POWDER, FOR SOLUTION INTRAVENOUS at 09:56

## 2024-03-25 RX ADMIN — ALLOPURINOL 100 MG: 100 TABLET ORAL at 09:45

## 2024-03-25 RX ADMIN — SODIUM CHLORIDE, PRESERVATIVE FREE 10 ML: 5 INJECTION INTRAVENOUS at 09:45

## 2024-03-25 RX ADMIN — SODIUM CHLORIDE, PRESERVATIVE FREE 10 ML: 5 INJECTION INTRAVENOUS at 19:55

## 2024-03-25 RX ADMIN — VANCOMYCIN HYDROCHLORIDE 1750 MG: 10 INJECTION, POWDER, LYOPHILIZED, FOR SOLUTION INTRAVENOUS at 04:46

## 2024-03-25 RX ADMIN — METOPROLOL SUCCINATE 12.5 MG: 25 TABLET, FILM COATED, EXTENDED RELEASE ORAL at 09:46

## 2024-03-25 ASSESSMENT — PAIN SCALES - GENERAL
PAINLEVEL_OUTOF10: 0
PAINLEVEL_OUTOF10: 3
PAINLEVEL_OUTOF10: 0
PAINLEVEL_OUTOF10: 6
PAINLEVEL_OUTOF10: 0
PAINLEVEL_OUTOF10: 0

## 2024-03-25 ASSESSMENT — PAIN DESCRIPTION - LOCATION: LOCATION: ANKLE

## 2024-03-25 ASSESSMENT — PAIN DESCRIPTION - DESCRIPTORS: DESCRIPTORS: SHARP

## 2024-03-25 ASSESSMENT — PAIN DESCRIPTION - FREQUENCY: FREQUENCY: INTERMITTENT

## 2024-03-25 ASSESSMENT — PAIN DESCRIPTION - PAIN TYPE: TYPE: CHRONIC PAIN

## 2024-03-25 ASSESSMENT — PAIN DESCRIPTION - ORIENTATION: ORIENTATION: LEFT

## 2024-03-25 ASSESSMENT — PAIN DESCRIPTION - ONSET: ONSET: GRADUAL

## 2024-03-25 ASSESSMENT — PAIN - FUNCTIONAL ASSESSMENT: PAIN_FUNCTIONAL_ASSESSMENT: PREVENTS OR INTERFERES SOME ACTIVE ACTIVITIES AND ADLS

## 2024-03-25 NOTE — PROGRESS NOTES
Infectious Disease Progress Note    Today's Date: 3/25/2024   Admit Date: 3/23/2024  1950  Chief Complaint:    Impression:   Left thigh abscess- s/p I&D 03/23; cultures with GNR; S/p ORIF with rashad placement 01/24/24  Cutaneous lupus - on Embrel, methotrexate, prednisone and Plaquinil. Immunosuppression may complicate healing.   Chronic congestive heart failure.   CKD    Plan:    Switch Zosyn to Cefepime pending ID of GNR  Continue Vancomycin until all cultures from 03/23 finalized.  Will need prolonged course of antibiotics given femur ORIF with hardware; would not place PICC yet as GNR maybe sensitive to FQ po  Would d/c doxycyline       Anti-infectives:   Cefepime (03/25-)  Vancomycin (03/23-)  Doxycycline IV (03/23-)  Piperaclllin/tazobactam (03/23-03/25)    Subjective:   Interval Events: Remains afebrile, with normal WBC. 03/23 operative cultures with GNR.  No thigh pain; no SOB, some productive cough    Patient is a 73 y.o. female seen for left thigh abscess. She had ORIF and rashad placement in January following a femoral fracture. Recently, she developed pain, swelling, and drainage from the left thigh. She was admitted to the hospital yesterday and had an I&D last night. Cultures are pending. Presently on vancomycin and Zosyn. Also on doxycyline, I believe, for suspected COPD exacerbation. She had and episode of discitis and Corynebacterium striatum bacteremia in 2021 from which she fully recovered. Also has cutaneous lupus and is on chronic immunosuppressives.     Patient Active Problem List   Diagnosis    Closed disp comminuted fracture of shaft of left femur with malunion    Rheumatoid arthritis (HCC)    Hypertension    Depression    Neuropathy    CKD (chronic kidney disease) stage 3, GFR 30-59 ml/min ([facility])    Iron deficiency anemia    Weakness of right upper extremity    Acute osteomyelitis of lumbar spine ([facility])    Acute respiratory failure with hypercapnia ([facility])    Anxiety    Chronic diastolic heart  (ATIVAN) 2 MG tablet Take 1 tablet by mouth nightly.    Provider, MD Anita   folic acid (FOLVITE) 1 MG tablet Take 1 tablet by mouth daily    ProviderAnita MD   melatonin 3 MG TABS tablet Take 1 tablet by mouth nightly    ProviderAnita MD       No Known Allergies     Review of Systems:  A comprehensive review of systems was negative except for that written in the History of Present Illness.    Objective:     Visit Vitals  BP (!) 124/58   Pulse 92   Temp 98.3 °F (36.8 °C) (Oral)   Resp 23   Ht 1.727 m (5' 8\")   Wt (!) 156.5 kg (345 lb)   SpO2 99%   BMI 52.46 kg/m²     Temp (24hrs), Av.4 °F (36.9 °C), Min:98 °F (36.7 °C), Max:98.9 °F (37.2 °C)       Lines:  Peripheral IV:       Physical Exam:    General:  Alert, cooperative, well noursished, well developed, appears stated age   Eyes:  Sclera anicteric. Pupils equally round and reactive to light.   Mouth/Throat: Mucous membranes normal, oral pharynx clear   Neck: Supple   Lungs:   Few posterior crackles   CV:  Regular rate and rhythm,no murmur, click, rub or gallop   Abdomen:   Soft, non-tender. bowel sounds normal. non-distended   Extremities: No cyanosis or edema. Wound vac over lateral left thigh. Still some erythema and tenderness around the wound vac.    Skin: Skin color, texture, turgor normal. no acute rash or lesions   Lymph nodes: Cervical and supraclavicular normal   Musculoskeletal: No swelling or deformity   Lines/Devices:  Intact, no erythema, drainage or tenderness   Psych: Alert and oriented, normal mood affect given the setting       Data Review:     CBC:  Recent Labs     24  1042 24  0102 24  1015 24  0316   WBC 7.1 6.6  --  5.8   HGB 9.6* 7.2* 7.7* 7.0*   HCT 33.1* 24.0* 26.4* 24.0*    200  --  230         BMP:  Recent Labs     24  0102 24  2231 24  0316   BUN 10 8 7*    142 142   K 4.0 3.0* 3.0*    108 109   CO2 32 31 30         LFTS:  Recent Labs      03/23/24  1042 03/24/24  2231   ALT 14 7*         Microbiology:   [unfilled]    Imaging:       Signed By: LISSETH ARANGO MD     March 25, 2024

## 2024-03-25 NOTE — CARE COORDINATION
Case Management Assessment  Initial Evaluation    Date/Time of Evaluation: 3/25/2024 3:17 PM  Assessment Completed by: Sena Hernandez RN    If patient is discharged prior to next notation, then this note serves as note for discharge by case management.    Patient Name: Ama Chu                   YOB: 1950  Diagnosis: Septicemia ([facility]) [A41.9]  Abscess of left thigh [L02.416]  Sepsis (HCC) [A41.9]  Pneumonia of left lower lobe due to infectious organism [J18.9]  Infection associated with internal fixation device, unspecified bone, initial encounter ([facility]) [T84.60XA]  Abscess [L02.91]                   Date / Time: 3/23/2024 10:10 AM    Patient Admission Status: Inpatient   Readmission Risk (Low < 19, Mod (19-27), High > 27): Readmission Risk Score: 32    Current PCP: Jose Miguel Jimenez Jr., MD  PCP verified by CM? (P) Yes    Chart Reviewed: Yes      History Provided by: (P) Child/Family (daughter, Migdalia)  Patient Orientation: (P) Unable to Assess (extubated this am)    Patient Cognition: Alert    Hospitalization in the last 30 days (Readmission):  Yes    If yes, Readmission Assessment in  Navigator will be completed.    Advance Directives:      Code Status: Full Code   Patient's Primary Decision Maker is: (P) Legal Next of Kin      Discharge Planning:    Patient lives with: (P) Alone Type of Home: (P) Long-Term Acute Care  Primary Care Giver: (P) Other (Comment) (SNF)  Patient Support Systems include: (P) Children, Family Members   Current Financial resources: (P) Medicare, Other (Comment) (MCR/BCBS)  Current community resources: (P) Other (Comment) (SNF)  Current services prior to admission: (P) Durable Medical Equipment            Current DME: (P) Walker, Wheelchair, Other (Comment) (slide board)            Type of Home Care services:  None    ADLS  Prior functional level: (P) Assistance with the following:  Current functional level: (P) Assistance with the following:    PT AM-PAC: 8 /24  OT

## 2024-03-25 NOTE — WOUND CARE
Patient seen for routine VAC dressing change to left lateral thigh. Wound clean and healthy. Premedicated for pain by nurse. Discussed dressing with patient. Changed dressing at this time. Patient tolerated well. Answered all questions.     Left thigh

## 2024-03-25 NOTE — PROGRESS NOTES
Initial visit made to patient and a prayer was provided. She was awake, receptive and appreciative of the 's visit.    Jamie Camarena MDIV, [facility]

## 2024-03-25 NOTE — PROGRESS NOTES
ACUTE OCCUPATIONAL THERAPY GOALS:   (Developed with and agreed upon by patient and/or caregiver.)  1. Patient will complete upper body bathing and dressing with MIN A and adaptive equipment as needed.   2. Patient will complete self-grooming tasks with SET-UP and adaptive equipment as needed.  3. Patient will tolerate 30 minutes of OT treatment with 1-2 rest breaks to increase activity tolerance for ADLs.   4. Patient will complete functional transfers with MOD A and adaptive equipment as needed.   5. Patient will complete functional activity while seated edge of bed with MIN A and adaptive equipment as needed.  6. Patient will tolerate 15 minutes BUE therapeutic activities to increase use of BUE during ADL performance.     Timeframe: 7 visits       OCCUPATIONAL THERAPY Initial Assessment, Daily Note, and AM       OT Visit Days: 1   Acknowledge Orders  Time  OT Charge Capture  Rehab Caseload Tracker      WBAT in LLE for transfers only    Ama Chu is a 73 y.o. female   PRIMARY DIAGNOSIS: Sepsis ([facility])  Septicemia ([facility]) [A41.9]  Abscess of left thigh [L02.416]  Sepsis (HCC) [A41.9]  Pneumonia of left lower lobe due to infectious organism [J18.9]  Infection associated with internal fixation device, unspecified bone, initial encounter ([facility]) [T84.60XA]  Abscess [L02.91]  Procedure(s) (LRB):  LEFT LEG INCISION AND DRAINAGE OF LEFT THIGH ABSCESS, WITH PLACEMENT OF WOUND VAC (Left)  2 Days Post-Op  Reason for Referral: Generalized Muscle Weakness (M62.81)  Other lack of cordination (R27.8)  Difficulty in walking, Not elsewhere classified (R26.2)  Inpatient: Payor: MEDICARE / Plan: MEDICARE PART A AND B / Product Type: *No Product type* /     ASSESSMENT:     REHAB RECOMMENDATIONS:   Recommendation to date pending progress:  Setting:  Short-term Rehab w/ transition to LTC    Equipment:    To Be Determined     ASSESSMENT:  Ms. Chu presents with deficits in overall strength, activity tolerance, activities of daily

## 2024-03-25 NOTE — PROGRESS NOTES
Hospitalist progress note   Admit Date:  3/23/2024 10:10 AM   Name:  Ama Chu   Age:  73 y.o.  Sex:  female  :  1950   MRN:  [id number]   Room:      Presenting/Chief Complaint: Abscess (Left femur - surgical site)     Reason(s) for Admission: Septicemia (HCC) [A41.9]  Abscess of left thigh [L02.416]  Sepsis (HCC) [A41.9]  Pneumonia of left lower lobe due to infectious organism [J18.9]  Infection associated with internal fixation device, unspecified bone, initial encounter ([facility]) [T84.60XA]  Abscess [L02.91]     Hospital course:       Ama Chu is a 73 y.o. female with medical history of rheumatoid arthritis, BMI 52, HFpEF, bedbound status, HTN, depression, cutaneous lupus, CKD, anemia, pneumonia who is evaluated with   Acute onset drainage left lateral thigh. No fever, no pain to area. Has been at [facility]. Not ambulatory. Had recent pneumonia. Seen in ED 3-21-24 s/p rocephin/ shanitaro for pneumonia. She declined to be admitted. CXR showed bibasilar opacities, small left effusion.       Today CXR similar   CTA chest shows volume loss left hemithorax, left base atelectasis and right base pleural reaction     CT left femur shows distal metaphyseal fracture and large enhancing fluid collection.     Orthopedics consulted       S/p dose vancomycin and zosyn    Blood cultures pending     HR elevated  Normal lactate and WBC count   Afebrile       She has potassium 2.9, mag 1.6      Subjective:   This morning patient extubated.  Alert and oriented.  Having some left lower extremity pain.  Otherwise denies any nausea or vomiting.      Assessment & Plan:       Sepsis     Left thigh abscess  Status post I&D  Continue broad-spectrum antibiotics  Follow cultures  Pain management  Orthopedic surgery recommendations      Pneumonia    Hypoxic respiratory failure postop   Continue antibiotics  Oxygen as needed  Bronchodilators as needed  Pulmonary recommendations appreciated       32.9 PG    MCHC 29.2 (L) 31.4 - 35.0 g/dL    RDW 18.6 (H) 11.9 - 14.6 %    Platelets 230 150 - 450 K/uL    MPV 10.1 9.4 - 12.3 FL    nRBC 0.00 0.0 - 0.2 K/uL    Neutrophils % 72 43 - 78 %    Lymphocytes % 14 13 - 44 %    Monocytes % 13 (H) 4.0 - 12.0 %    Eosinophils % 0 (L) 0.5 - 7.8 %    Basophils % 0 0.0 - 2.0 %    Immature Granulocytes 1 0.0 - 5.0 %    Neutrophils Absolute 4.1 1.7 - 8.2 K/UL    Lymphocytes Absolute 0.8 0.5 - 4.6 K/UL    Monocytes Absolute 0.8 0.1 - 1.3 K/UL    Eosinophils Absolute 0.0 0.0 - 0.8 K/UL    Basophils Absolute 0.0 0.0 - 0.2 K/UL    Absolute Immature Granulocyte 0.1 0.0 - 0.5 K/UL    RBC Comment TEARDROP CELLS      RBC Comment POLYCHROMASIA      RBC Comment SCHISTOCYTES      RBC Comment SLIGHT  ANISOCYTOSIS + POIKILOCYTOSIS        RBC Comment OVALOCYTES      WBC Comment Result Confirmed By Smear      Platelet Comment LARGE FORMS PRESENT      Differential Type AUTOMATED     Magnesium    Collection Time: 03/25/24  3:16 AM   Result Value Ref Range    Magnesium 1.9 1.8 - 2.4 mg/dL   Basic Metabolic Panel    Collection Time: 03/25/24  3:16 AM   Result Value Ref Range    Sodium 142 136 - 146 mmol/L    Potassium 3.0 (L) 3.5 - 5.1 mmol/L    Chloride 109 103 - 113 mmol/L    CO2 30 21 - 32 mmol/L    Anion Gap 3 2 - 11 mmol/L    Glucose 79 65 - 100 mg/dL    BUN 7 (L) 8 - 23 MG/DL    Creatinine 0.50 (L) 0.6 - 1.0 MG/DL    Est, Glom Filt Rate >60 >60 ml/min/1.73m2    Calcium 8.6 8.3 - 10.4 MG/DL       No results for input(s): \"COVID19\" in the last 72 hours.      CT FEMUR LEFT W CONTRAST    Result Date: 3/23/2024  CLINICAL HISTORY: Left thigh abscess. TECHNIQUE: CT of the left thigh is obtained with multiplanar acquisition. 100 cc of intravenous Isovue-370 utilized. FINDINGS: Again there is evidence of intramedullary rashad with suggestion of an acute appearing fracture of the distal femoral metaphysis with mild comminution at the fracture site. Multiple distal interlocking screws are noted.  Furthermore however a large massive fluid collection noted extending 26 cm craniocaudally by 11 cm mediolaterally by 12 cm anteroposterior oblique. This is further extending into the medial abductor compartment. Furthermore this demonstrates an enhancing rim.     1. Per history patient open reduction internal fixation on 1/24/2024. There distal metaphyseal femoral fracture on the left however appears to be of acute nature with comminution. Clinical correlation is recommended. Intramedullary rashad appears to be in place. However circumferentially this is surrounded by a large enhancing fluid collection as described highly suspicious for an abscess until proven otherwise. 2. Moderate patellofemoral and medial tibiofemoral degenerative arthropathy.    CT CHEST PULMONARY EMBOLISM W CONTRAST    Result Date: 3/23/2024  EXAMINATION: CT CHEST PULMONARY EMBOLISM W CONTRAST 3/23/2024 1:44 PM ACCESSION NUMBER: [id number] COMPARISON: None available INDICATION: Shortness of breath and hypoxia-PE?  Pneumonia? TECHNIQUE: Multiple contiguous 2D axial CT images of the chest were obtained from the lung apices to the lung bases after the intravenous administration of 100mL Iso-wesley 370 per pulmonary angiography protocol.  Coronal reconstructions were performed. Radiation dose reduction techniques were used for this study. Our CT scanners use one or all of the following: Automated exposure control, adjustment of the mA and/or kV according to patient size, iterative reconstruction. FINDINGS: There is consolidative atelectasis at the posterior left lung base. Right base pleural reaction. There is volume loss of the left hemithorax with compensatory hyperaeration of the right lung field. No pleural or pericardial effusion. No mediastinal or axillary lymphadenopathy. Lung fields are otherwise clear. No evidence of pulmonary embolism. No thoracic aortic aneurysm or dissection. No pulmonary arterial hypertension. The main pulmonary arterial

## 2024-03-25 NOTE — PROGRESS NOTES
ACUTE PHYSICAL THERAPY GOALS:   (Developed with and agreed upon by patient and/or caregiver.)  LTG:  (1.)Ms. Chu will move from supine to sit and sit to supine , scoot up and down, and roll side to side in bed with MINIMAL ASSIST within 7 treatment day(s).    (2.)Ms. Cuh will transfer from bed to chair and chair to bed with MODERATE ASSIST x 2 using sliding board within 7 treatment day(s).    (3.)Ms. Chu will propel a manual wheelchair for 100 ft with SUPERVISION for increased independence within 7 treatment day(s).  (4.)Ms. Chu will perform standing static and dynamic balance activities x 15 minutes with SUPERVISION to improve safety within 7 day(s).      ________________________________________________________________________________________________        PHYSICAL THERAPY Initial Assessment and AM  (Link to Caseload Tracking: PT Visit Days : 1  Acknowledge Orders  Time In/Out  PT Charge Capture  Rehab Caseload Tracker  WBAT L LE FOR TRANSFER ONLY    Ama Chu is a 73 y.o. female   PRIMARY DIAGNOSIS: Sepsis (formerly [facility])  Septicemia (formerly [facility]) [A41.9]  Abscess of left thigh [L02.416]  Sepsis (formerly [facility]) [A41.9]  Pneumonia of left lower lobe due to infectious organism [J18.9]  Infection associated with internal fixation device, unspecified bone, initial encounter (formerly [facility]) [T84.60XA]  Abscess [L02.91]  Procedure(s) (LRB):  LEFT LEG INCISION AND DRAINAGE OF LEFT THIGH ABSCESS, WITH PLACEMENT OF WOUND VAC (Left)  2 Days Post-Op  Reason for Referral: Generalized Muscle Weakness (M62.81)  Difficulty in walking, Not elsewhere classified (R26.2)  Inpatient: Payor: MEDICARE / Plan: MEDICARE PART A AND B / Product Type: *No Product type* /     ASSESSMENT:     REHAB RECOMMENDATIONS:   Recommendation to date pending progress:  Setting:  Short-term Rehab    Equipment:    To Be Determined     ASSESSMENT:  Ms. Chu was admitted to the hospital for acute drainage of left thigh.  Pt has a recent history of internal fixation performed by

## 2024-03-25 NOTE — INTERDISCIPLINARY ROUNDS
Multi-D Rounds/Checklist (leapfrog):  Lines: can any be removed?: None    Negative Pressure Wound Therapy Leg Left;Upper (Active)       Urinary Catheter 03/24/24 Ward (Active)      DVT Prophylaxis: Ordered-SCDs  Vent: N/A  Nutrition Ordered/appropriate: Per Primary Team  Can antibiotics or other drugs be stopped? Yes/End Date set Yes/No  Inpat Anti-Infectives (From admission, onward)       Start     Ordered Stop    03/24/24 1700  vancomycin (VANCOCIN) 1750 mg in sodium chloride 0.9 % 500 mL IVPB  1,750 mg,   IntraVENous,   EVERY 12 HOURS         03/24/24 0900 --    03/24/24 0815  doxycycline (VIBRAMYCIN) 100 mg in sodium chloride 0.9 % 100 mL IVPB (mini-bag)  100 mg,   IntraVENous,   EVERY 12 HOURS         03/24/24 0756 03/29/24 0814    03/23/24 2000  piperacillin-tazobactam (ZOSYN) 4,500 mg in sodium chloride 0.9 % 100 mL IVPB (mini-bag)  4,500 mg,   IntraVENous,   EVERY 8 HOURS         03/23/24 1846 03/31/24 0059                  Consults needed: None  A: Is pain control adequate? (has PRNs? Stop drip?) Yes  B: Sedation break and SBT? N/A  C: Is sedation choice appropriate? N/A  D: Delirium/CAM-ICU? No  E: Mobility goals/appropriateness? Yes  F: Family update and plan? Daughter is surrogate decision maker and is being updated daily by primary attending and nursing staff.    RAFFY Macias

## 2024-03-25 NOTE — PROGRESS NOTES
VANCO DAILY FOLLOW UP NOTE  [facility]   Pharmacy Pharmacokinetic Monitoring Service - Vancomycin    Consulting Provider: Dr. Gutierrez   Indication: surgical site infection  Target Concentration: Goal AUC/HIRAL 400-600 mg*hr/L  Day of Therapy: 3  Additional Antimicrobials: doxycycline, pip/tazo    Pertinent Laboratory Values:   Wt Readings from Last 1 Encounters:   03/23/24 (!) 156.5 kg (345 lb)     Temp Readings from Last 1 Encounters:   03/25/24 98.3 °F (36.8 °C) (Oral)     Recent Labs     03/23/24  1042 03/24/24  0102 03/24/24  2231 03/25/24  0316   BUN 14 10 8 7*   CREATININE 0.80 0.50* 0.50* 0.50*   WBC 7.1 6.6  --  5.8   PROCAL 0.20  --   --   --    LACTSEPSIS 1.6  --   --   --      Estimated Creatinine Clearance: 162 mL/min (A) (based on SCr of 0.5 mg/dL (L)).    Lab Results   Component Value Date/Time    VANCORANDOM 14.1 01/30/2024 04:18 AM     MRSA Nasal Swab: N/A. Non-respiratory infection    Assessment:  Date/Time Dose Concentration AUC         Note: Serum concentrations collected for AUC dosing may appear elevated if collected in close proximity to the dose administered, this is not necessarily an indication of toxicity    Plan:  Dosing recommendations based on Bayesian software  Continue vancomycin 1750 mg every 12 hours   Anticipated AUC of 530 and trough concentration of 16.1 at steady state  Renal labs as indicated   Vancomycin concentrations will be ordered as clinically appropriate   Pharmacy will continue to monitor patient and adjust therapy as indicated    Thank you for the consult,  Quiana Mace [facility]

## 2024-03-25 NOTE — PROGRESS NOTES
[facility] Critical Care Note:: 3/25/2024  Ama Chu  Admission Date: 3/23/2024     Length of Stay: 2 days    Background: 73 y.o. y/o female with a history of RA, HTN, morbid obesity, neuropathy, depression, and recent L hip fx s/p ORIF in 1/2024 who was recently admitted for RUE weakness 2/29-3/3 felt to be musculoskeletal. Pt  improved and was discharged home. She presented back to the ER on 3/23 with complaints of L thigh pain. CT L femur confirmed L thigh abscess. Pt was admitted by hospitalist and ortho trauma was consulted. Pt underwent I&D of L thigh abscess on 3/23 with introduction of stimulan abx beads, and placement of wound Vac. Pt transferred to the ICU on vent from the OR. Pt was seen for vent weaning on 3/24. Pt was extubated 3/24.     Notable PMH:  has no past medical history on file.    24 Hour events: Pt sitting up in bed on 2L O2. Pt is eating breakfast.   She is not on O2 at home.   She is coughing and coughing up clear phlegm.     Review of Systems: Comprehensive ROS negative except in HPI     Lines: (insertion date)    Negative Pressure Wound Therapy Leg Left;Upper (Active)       Urinary Catheter 03/24/24 Ward (Active)      Drips: current dose (range)  Dose (mcg/kg/min) Propofol : 0 mcg/kg/min     Pertinent Exam:         Blood pressure (!) 124/58, pulse 89, temperature 98.3 °F (36.8 °C), temperature source Oral, resp. rate 24, height 1.727 m (5' 8\"), weight (!) 156.5 kg (345 lb), SpO2 100 %.   Intake/Output Summary (Last 24 hours) at 3/25/2024 0923  Last data filed at 3/25/2024 0643  Gross per 24 hour   Intake 4026.16 ml   Output 1075 ml   Net 2951.16 ml     Constitutional:  NAD, morbidly obese, on 2L O2  EENMT:  Sclera clear, pupils equal, oral mucosa moist  Respiratory: distant breath sounds   Cardiovascular:  RRR  Gastrointestinal:  soft with no tenderness; positive bowel sounds present  Musculoskeletal:  warm with no cyanosis, no lower extremity edema  Skin:  no

## 2024-03-25 NOTE — ACP (ADVANCE CARE PLANNING)
Advance Care Planning     Advance Care Planning Activator (Inpatient)  Conversation Note      Date of ACP Conversation: 3/25/2024         ACP Activator: Sena Hernandez RN    {When Decision Maker makes decisions on behalf of the incapacitated patient: Decision Maker is asked to consider and make decisions based on patient values, known preferences, or best interests.     Health Care Decision Maker: NO LW/HCPOA on file currently. Pt . All children are legal NOK unless document presented or completed stating otherwise.     Current Designated Health Care Decision Maker:     Click here to complete Healthcare Decision Makers including section of the Healthcare Decision Maker Relationship (ie \"Primary\")  Today we documented Decision Maker(s) consistent with Legal Next of Kin hierarchy.    Care Preferences  Full code per MD orders.

## 2024-03-25 NOTE — PROGRESS NOTES
[facility]        March 25, 2024         Post Op day: 2 Days Post-Op Procedure(s) (LRB):  LEFT LEG INCISION AND DRAINAGE OF LEFT THIGH ABSCESS, WITH PLACEMENT OF WOUND VAC (Left)      Admit Date: 3/23/2024  Admit Diagnosis: Septicemia (HCC) [A41.9]  Abscess of left thigh [L02.416]  Sepsis (HCC) [A41.9]  Pneumonia of left lower lobe due to infectious organism [J18.9]  Infection associated with internal fixation device, unspecified bone, initial encounter ([facility]) [T84.60XA]  Abscess [L02.91]       Principle Problem: Sepsis ([facility]).           Subjective: Doing well, No complaints, No SOB, No Chest Pain, No Nausea or Vomiting     Objective:   Vital Signs are Stable, No Acute Distress, Alert,  Dressing is Dry,    Neurovascular check:intact. Wound vac functioning well.     Assessment / Plan :  Patient Active Problem List   Diagnosis    Closed disp comminuted fracture of shaft of left femur with malunion    Rheumatoid arthritis (HCC)    Hypertension    Depression    Neuropathy    CKD (chronic kidney disease) stage 3, GFR 30-59 ml/min (HCC)    Iron deficiency anemia    Weakness of right upper extremity    Acute osteomyelitis of lumbar spine (HCC)    Acute respiratory failure with hypercapnia (HCC)    Anxiety    Chronic diastolic heart failure (HCC)    Cutaneous lupus erythematosus    Discitis of lumbar region    Morbid obesity (HCC)    MEET (obstructive sleep apnea)    Plantar fasciitis, left    Sepsis (HCC)    Hypomagnesemia    Hypokalemia    Pneumonia    Abscess of left thigh    Abscess    Patient Vitals for the past 8 hrs:   BP Temp Temp src Pulse Resp SpO2   03/25/24 0630 (!) 107/55 -- -- 87 21 99 %   03/25/24 0615 (!) 114/53 -- -- 90 (!) 37 100 %   03/25/24 0604 -- -- -- -- 20 --   03/25/24 0600 (!) 112/55 -- -- 88 20 100 %   03/25/24 0545 (!) 123/58 -- -- 87 (!) 33 100 %   03/25/24 0530 (!) 117/56 -- -- 85 (!) 37 100 %   03/25/24 0515 (!) 111/53 -- -- 86 26 100 %   03/25/24 0500 (!) 115/54 -- -- 86 22 100 %

## 2024-03-26 PROBLEM — D84.9 IMMUNOCOMPROMISED STATE (HCC): Status: ACTIVE | Noted: 2024-03-26

## 2024-03-26 LAB
ANION GAP SERPL CALC-SCNC: 3 MMOL/L (ref 2–11)
BACTERIA SPEC CULT: ABNORMAL
BACTERIA SPEC CULT: NORMAL
BACTERIA SPEC CULT: NORMAL
BASOPHILS # BLD: 0 K/UL (ref 0–0.2)
BASOPHILS NFR BLD: 0 % (ref 0–2)
BUN SERPL-MCNC: 9 MG/DL (ref 8–23)
CALCIUM SERPL-MCNC: 9 MG/DL (ref 8.3–10.4)
CHLORIDE SERPL-SCNC: 108 MMOL/L (ref 103–113)
CO2 SERPL-SCNC: 32 MMOL/L (ref 21–32)
CREAT SERPL-MCNC: 0.9 MG/DL (ref 0.6–1)
DIFFERENTIAL METHOD BLD: ABNORMAL
EOSINOPHIL # BLD: 0.1 K/UL (ref 0–0.8)
EOSINOPHIL NFR BLD: 1 % (ref 0.5–7.8)
ERYTHROCYTE [DISTWIDTH] IN BLOOD BY AUTOMATED COUNT: 18.7 % (ref 11.9–14.6)
GLUCOSE SERPL-MCNC: 112 MG/DL (ref 65–100)
GRAM STN SPEC: ABNORMAL
HCT VFR BLD AUTO: 29.4 % (ref 35.8–46.3)
HGB BLD-MCNC: 8.5 G/DL (ref 11.7–15.4)
IMM GRANULOCYTES # BLD AUTO: 0.1 K/UL (ref 0–0.5)
IMM GRANULOCYTES NFR BLD AUTO: 1 % (ref 0–5)
LYMPHOCYTES # BLD: 0.8 K/UL (ref 0.5–4.6)
LYMPHOCYTES NFR BLD: 8 % (ref 13–44)
MCH RBC QN AUTO: 27.4 PG (ref 26.1–32.9)
MCHC RBC AUTO-ENTMCNC: 28.9 G/DL (ref 31.4–35)
MCV RBC AUTO: 94.8 FL (ref 82–102)
MONOCYTES # BLD: 0.9 K/UL (ref 0.1–1.3)
MONOCYTES NFR BLD: 10 % (ref 4–12)
NEUTS SEG # BLD: 7.5 K/UL (ref 1.7–8.2)
NEUTS SEG NFR BLD: 80 % (ref 43–78)
NRBC # BLD: 0 K/UL (ref 0–0.2)
PLATELET # BLD AUTO: 274 K/UL (ref 150–450)
PMV BLD AUTO: 10.3 FL (ref 9.4–12.3)
POTASSIUM SERPL-SCNC: 3.4 MMOL/L (ref 3.5–5.1)
RBC # BLD AUTO: 3.1 M/UL (ref 4.05–5.2)
SERVICE CMNT-IMP: ABNORMAL
SERVICE CMNT-IMP: NORMAL
SERVICE CMNT-IMP: NORMAL
SODIUM SERPL-SCNC: 143 MMOL/L (ref 136–146)
VANCOMYCIN TROUGH SERPL-MCNC: 36 UG/ML
WBC # BLD AUTO: 9.4 K/UL (ref 4.3–11.1)

## 2024-03-26 PROCEDURE — 6370000000 HC RX 637 (ALT 250 FOR IP): Performed by: INTERNAL MEDICINE

## 2024-03-26 PROCEDURE — 6360000002 HC RX W HCPCS: Performed by: INTERNAL MEDICINE

## 2024-03-26 PROCEDURE — 36415 COLL VENOUS BLD VENIPUNCTURE: CPT

## 2024-03-26 PROCEDURE — 76937 US GUIDE VASCULAR ACCESS: CPT

## 2024-03-26 PROCEDURE — 2580000003 HC RX 258: Performed by: INTERNAL MEDICINE

## 2024-03-26 PROCEDURE — 2580000003 HC RX 258: Performed by: ORTHOPAEDIC SURGERY

## 2024-03-26 PROCEDURE — 2500000003 HC RX 250 WO HCPCS: Performed by: INTERNAL MEDICINE

## 2024-03-26 PROCEDURE — 6370000000 HC RX 637 (ALT 250 FOR IP): Performed by: ORTHOPAEDIC SURGERY

## 2024-03-26 PROCEDURE — 80202 ASSAY OF VANCOMYCIN: CPT

## 2024-03-26 PROCEDURE — 85025 COMPLETE CBC W/AUTO DIFF WBC: CPT

## 2024-03-26 PROCEDURE — 1100000000 HC RM PRIVATE

## 2024-03-26 PROCEDURE — 6370000000 HC RX 637 (ALT 250 FOR IP): Performed by: PHYSICIAN ASSISTANT

## 2024-03-26 PROCEDURE — 80048 BASIC METABOLIC PNL TOTAL CA: CPT

## 2024-03-26 RX ADMIN — Medication 3 MG: at 22:03

## 2024-03-26 RX ADMIN — TORSEMIDE 100 MG: 100 TABLET ORAL at 09:57

## 2024-03-26 RX ADMIN — ESCITALOPRAM OXALATE 10 MG: 10 TABLET ORAL at 09:56

## 2024-03-26 RX ADMIN — PANTOPRAZOLE SODIUM 40 MG: 40 TABLET, DELAYED RELEASE ORAL at 06:11

## 2024-03-26 RX ADMIN — FERROUS SULFATE TAB 325 MG (65 MG ELEMENTAL FE) 325 MG: 325 (65 FE) TAB at 09:57

## 2024-03-26 RX ADMIN — CEFEPIME 2000 MG: 2 INJECTION, POWDER, FOR SOLUTION INTRAVENOUS at 02:19

## 2024-03-26 RX ADMIN — TAMSULOSIN HYDROCHLORIDE 0.4 MG: 0.4 CAPSULE ORAL at 09:57

## 2024-03-26 RX ADMIN — POTASSIUM CHLORIDE 40 MEQ: 1500 TABLET, EXTENDED RELEASE ORAL at 06:11

## 2024-03-26 RX ADMIN — ALLOPURINOL 100 MG: 100 TABLET ORAL at 09:57

## 2024-03-26 RX ADMIN — OXYCODONE 5 MG: 5 TABLET ORAL at 14:12

## 2024-03-26 RX ADMIN — DOXYCYCLINE 100 MG: 100 INJECTION, POWDER, LYOPHILIZED, FOR SOLUTION INTRAVENOUS at 09:58

## 2024-03-26 RX ADMIN — FOLIC ACID 1 MG: 1 TABLET ORAL at 09:57

## 2024-03-26 RX ADMIN — SODIUM CHLORIDE, PRESERVATIVE FREE 5 ML: 5 INJECTION INTRAVENOUS at 22:04

## 2024-03-26 RX ADMIN — METOPROLOL SUCCINATE 12.5 MG: 25 TABLET, FILM COATED, EXTENDED RELEASE ORAL at 10:06

## 2024-03-26 RX ADMIN — CEFEPIME 2000 MG: 2 INJECTION, POWDER, FOR SOLUTION INTRAVENOUS at 17:14

## 2024-03-26 RX ADMIN — ASPIRIN 81 MG: 81 TABLET, COATED ORAL at 09:56

## 2024-03-26 RX ADMIN — PREDNISONE 5 MG: 5 TABLET ORAL at 09:56

## 2024-03-26 RX ADMIN — CEFEPIME 2000 MG: 2 INJECTION, POWDER, FOR SOLUTION INTRAVENOUS at 10:30

## 2024-03-26 ASSESSMENT — PAIN DESCRIPTION - LOCATION: LOCATION: LEG

## 2024-03-26 ASSESSMENT — PAIN DESCRIPTION - DESCRIPTORS: DESCRIPTORS: TENDER;SQUEEZING;SHOOTING

## 2024-03-26 ASSESSMENT — PAIN SCALES - GENERAL: PAINLEVEL_OUTOF10: 10

## 2024-03-26 NOTE — PROGRESS NOTES
TRANSFER - OUT REPORT:    Verbal report given to CANDICE Manzanares on Ama Chu  being transferred to room 729 for routine progression of patient care       Report consisted of patient's Situation, Background, Assessment and   Recommendations(SBAR).     Information from the following report(s) Nurse Handoff Report was reviewed with the receiving nurse.           Lines:   Peripheral IV 03/23/24 Right Antecubital (Active)   Site Assessment Clean, dry & intact 03/25/24 2300   Line Status Infusing 03/25/24 2300   Line Care Connections checked and tightened 03/25/24 2300   Phlebitis Assessment No symptoms 03/25/24 2300   Infiltration Assessment 0 03/25/24 2300   Alcohol Cap Used Yes 03/25/24 2300   Dressing Status Clean, dry & intact 03/25/24 2300   Dressing Type Transparent 03/25/24 2300        Opportunity for questions and clarification was provided.      Patient transported with:  Registered Nurse  Cell phone and pink blanket with patient when leaving ICU.  Pt is alert and in no apparent distress @ time of transfer.     Attempted to call pt's daughter, Migdalia to inform of transfer to 7th floor, no answer @ 0200.

## 2024-03-26 NOTE — PLAN OF CARE
Problem: Pain  Goal: Verbalizes/displays adequate comfort level or baseline comfort level  Outcome: Progressing  Flowsheets  Taken 3/25/2024 2300 by Nancy Yip RN  Verbalizes/displays adequate comfort level or baseline comfort level: Encourage patient to monitor pain and request assistance  Taken 3/25/2024 2005 by Nancy Yip RN  Verbalizes/displays adequate comfort level or baseline comfort level: Encourage patient to monitor pain and request assistance     Problem: Skin/Tissue Integrity  Goal: Absence of new skin breakdown  Description: 1.  Monitor for areas of redness and/or skin breakdown  2.  Assess vascular access sites hourly  3.  Every 4-6 hours minimum:  Change oxygen saturation probe site  4.  Every 4-6 hours:  If on nasal continuous positive airway pressure, respiratory therapy assess nares and determine need for appliance change or resting period.  Outcome: Progressing     Problem: Discharge Planning  Goal: Discharge to home or other facility with appropriate resources  Outcome: Progressing  Flowsheets (Taken 3/25/2024 1905 by Nancy Yip, RN)  Discharge to home or other facility with appropriate resources: Identify barriers to discharge with patient and caregiver     Problem: Safety - Medical Restraint  Goal: Remains free of injury from restraints (Restraint for Interference with Medical Device)  Description: INTERVENTIONS:  1. Determine that other, less restrictive measures have been tried or would not be effective before applying the restraint  2. Evaluate the patient's condition at the time of restraint application  3. Inform patient/family regarding the reason for restraint  4. Q2H: Monitor safety, psychosocial status, comfort, nutrition and hydration  Outcome: Progressing     Problem: Safety - Adult  Goal: Free from fall injury  Outcome: Progressing     Problem: ABCDS Injury Assessment  Goal: Absence of physical injury  Outcome: Progressing

## 2024-03-26 NOTE — ICUWATCH
RRT Clinical Rounding Nurse Progress Report      SUBJECTIVE: Patient assessed secondary to transfer from critical care.      Vitals:    03/26/24 0045 03/26/24 0100 03/26/24 0115 03/26/24 0130   BP:  (!) 110/54     Pulse: 91 89 90 90   Resp: 24 22 26 23   Temp:       TempSrc:       SpO2: 97% 97% 96% 96%   Weight:       Height:            DETERIORATION INDEX SCORE: 35    ASSESSMENT:  Pt is A&O x4 and appears to be in NAD at this time. O2 sat 96% on RA. Pt denies any pain and voices no complaints. Tx to 7th floor in bed by 2 RN's. Wound vac to LLE. Cell phone sent with pt.      PLAN:  Will follow per RRT Clinical Rounding Program protocol.    Justina Evangelista RN  [facility]: [phone]  [facility]: [phone]

## 2024-03-26 NOTE — PROGRESS NOTES
TRANSFER - IN REPORT:    Verbal report received from CANDICE Caceres on Ama Chu  being received from ICU for routine progression of patient care      Report consisted of patient's Situation, Background, Assessment and   Recommendations(SBAR).     Information from the following report(s) Nurse Handoff Report, Index, Adult Overview, Intake/Output, MAR, Cardiac Rhythm Sinus Rhythm, and Quality Measures was reviewed with the receiving nurse.    Opportunity for questions and clarification was provided.      Assessment to be completed upon patient's arrival to unit and care assumed.

## 2024-03-26 NOTE — PROGRESS NOTES
Infectious Disease Progress Note    Today's Date: 3/26/2024   Admit Date: 3/23/2024  1950  Chief Complaint:    Impression:   Left thigh abscess- s/p I&D 03/23; cultures with E coli, pending suscept; S/p ORIF with rashad placement 01/24/24  Cutaneous lupus - on Embrel, methotrexate, prednisone and Plaquinil. Immunosuppression may complicate healing.   Chronic congestive heart failure.   CKD    Plan:   Continue Cefepime.   Stop Vancomycin since no GPCs isolated from cx.   Will need at least 6 weeks course of antibiotics given femur ORIF with hardware  Follow up susceptibilities to make final plan.   ID will follow along.       Anti-infectives:   Cefepime (03/25-)  Vancomycin (03/23-)  Doxycycline IV (03/23-)  Piperaclllin/tazobactam (03/23-03/25)    Subjective:   Interval Events: WBC of 9.4. Tmax of 99.9. wound cx are still pending with GNRs. Wound vac to left leg noted with bloody drainage. Pt feeling fine otherwise. Daughter at bedside updated on plan.     Patient Active Problem List   Diagnosis    Closed disp comminuted fracture of shaft of left femur with malunion    Rheumatoid arthritis (HCC)    Hypertension    Depression    Neuropathy    CKD (chronic kidney disease) stage 3, GFR 30-59 ml/min ([facility])    Iron deficiency anemia    Weakness of right upper extremity    Acute osteomyelitis of lumbar spine (HCC)    Acute respiratory failure with hypercapnia ([facility])    Anxiety    Chronic diastolic heart failure (HCC)    Cutaneous lupus erythematosus    Discitis of lumbar region    Morbid obesity (HCC)    MEET (obstructive sleep apnea)    Plantar fasciitis, left    Sepsis (HCC)    Hypomagnesemia    Hypokalemia    Pneumonia    Abscess of left thigh    Abscess    Hypoxemia    Septicemia ([facility])     History reviewed. No pertinent past medical history.   No family history on file.   Social History     Tobacco Use    Smoking status: Former     Types: Cigarettes    Smokeless tobacco: Never   Substance Use Topics    Alcohol use: Not

## 2024-03-26 NOTE — ICUWATCH
RRT Clinical Rounding Nurse Progress Report      SUBJECTIVE: Patient assessed secondary to transfer from critical care.  [id number]    Vitals:    03/26/24 0115 03/26/24 0130 03/26/24 0219 03/26/24 0741   BP:   (!) 119/55 125/75   Pulse: 90 90 89 93   Resp: 26 23 20 20   Temp:   98.3 °F (36.8 °C) 99.9 °F (37.7 °C)   TempSrc:   Oral Oral   SpO2: 96% 96%  96%   Weight:       Height:            DETERIORATION INDEX SCORE: 33    ASSESSMENT:  Pt lying awake in bed, visitor at bedside.  Pt A&Ox3, following commands.  Lung sounds with bibasilar crackles heard.  Pt encouraged cough/deep breathing exercises-- has a strong cough.  On room air, O2 Sat 95%.  Left thigh wound vac present, drsg cdi.  Pt denies any pain or needs at this time.      PLAN:  Will follow per RRT Clinical Rounding Program protocol.   Discussed pt limited access with Evelin Henson NP on floor.  Order for midline or PICC placed, whichever VAT will be able to get (try for midline first).  Continue PT/OT.  CM helping with rehab/poss LTACH.    Geovanna Hargrove RN  [place]: [phone]  [place]: [phone]

## 2024-03-26 NOTE — PROGRESS NOTES
Hospitalist Progress Note   Admit Date:  3/23/2024 10:10 AM   Name:  Ama Chu   Age:  73 y.o.  Sex:  female  :  1950   MRN:  [id number]   Room:  [facility]/    Presenting/Chief Complaint: Abscess (Left femur - surgical site)     Reason(s) for Admission: Septicemia (HCC) [A41.9]  Abscess of left thigh [L02.416]  Sepsis (HCC) [A41.9]  Pneumonia of left lower lobe due to infectious organism [J18.9]  Infection associated with internal fixation device, unspecified bone, initial encounter ([facility]) [T84.60XA]  Abscess [L02.91]     Hospital Course:   Patient is a 72 y/o female with medical history of cutaneous lupus/RA, morbid obesity, bedbound status, hypertension, depression, iron deficiency anemia, diastolic heart failure, CKD III, left femoral fracture s/p ORIF with rashad placement (2023) who presented to ED 3/23 from short term rehab facility with cc left thigh pain, swelling, and drainage. Recent admission for RUE weakness (-3/3) with negative stroke workup. ED evaluation 3/21/24 with acute respiratory failure due to pneumonia with admission requested but patient declined admission - discharged with antibiotics and steroids.     Tachycardia and tachypneic on arrival. Afebrile.  Labs on admission notable for hypokalemia and normocytic anemia otherwise unremarkable. LA, procal, and WBC wnl.  CXR revealed consolidation in LLL.  CT of left thigh revealed abscess and likely infected hardware, concern of acute distal comminuted fracture as well.  CT chest obtained negative for PE, LLL consolidative atelectasis noted.  Patient met sepsis criteria with tachycardia, tachypnea; not severe. Blood cultures obtained. Initiated on Zosyn and Vancomycin. Did receive dose of Cefazolin and Gentamicin as well.  Hospitalist admitted for sepsis due to left thigh abscess. Orthopedics consulted.    Patient underwent I&D of left distal thigh abscess surrounding fixation of distal femur fracture, wound vac application, and  placement of antibiotic beads 3/23 with Orthopedics. Post-operatively unable to be extubated so she was transferred to ICU with Pulmonologist assisting with care. Suspected underlying sleep apnea vs OHS. She was successfully extubated and transferred to medical floor 3/25.    ID following for antibiotic recommendations: remains on Cefepime and Vancomycin. Duration and route TBD. Anticipate prolonged course due to instrumentation.    Orthopedics following: Dr. Braga will remove wound vac 3/27 and either replace or arrange for surgical closure.    PT/OT with rehab recommendations. [facility] pending medical clearance by Orthopedics and ID.    Subjective & 24hr Events:   Nursing staff requested PICC placement due to lack of appropriate access. Unable to find appropriate access with ultrasound either. I have requested midline access if possible and if unable to find, okay to place PICC. Renal function is currently normal.    Patient alert, oriented, conversational. She is in bed. Daughter is present in room. Lengthy discussion on plan of care. She is on RA and stable. She is eating well. Wound vac remains in place. Daughter feels like some words are hard for her to find, which is not baseline, but feels this has improved some each day since surgery. Motor exam intact. Speech intact.     Orthopedics noted aspirin 325 mg po daily. Surgeon placed on 81 mg po daily. I spoke with Orthopedic specialist who stated no changes needed.    Discontinue Doxycycline. Remains on Vancomycin and Cefepime per ID.  Sylvia from ICU stay - remove for voiding trial.  No events on telemetry, discontinue.    Assessment & Plan:     Sepsis secondary to left thigh abscess (HCC)  -Surgical I&D 3/23  -Wound vac in place, to be exchanged tomorrow with Dr. Braga  -ID on board for antibiotic plan: remains on Cefepime and Vancomycin for now, duration and final route TBD  -Blood cultures negative to date  -Surgical

## 2024-03-26 NOTE — PROGRESS NOTES
[facility]        2024         Post Op day: 3 Days Post-Op Procedure(s) (LRB):  LEFT LEG INCISION AND DRAINAGE OF LEFT THIGH ABSCESS, WITH PLACEMENT OF WOUND VAC (Left)      Admit Date: 3/23/2024  Admit Diagnosis: Septicemia (HCC) [A41.9]  Abscess of left thigh [L02.416]  Sepsis ([facility]) [A41.9]  Pneumonia of left lower lobe due to infectious organism [J18.9]  Infection associated with internal fixation device, unspecified bone, initial encounter ([facility]) [T84.60XA]  Abscess [L02.91]       Principle Problem: Sepsis ([facility]).           Subjective: Doing well, No complaints, No SOB, No Chest Pain, No Nausea or Vomiting     Objective:   Vital Signs are Stable, No Acute Distress, Alert,  Dressing is Dry,  Neurovascular exam is normal.     Assessment / Plan :  Patient Active Problem List   Diagnosis    Closed disp comminuted fracture of shaft of left femur with malunion    Rheumatoid arthritis (HCC)    Hypertension    Depression    Neuropathy    CKD (chronic kidney disease) stage 3, GFR 30-59 ml/min (HCC)    Iron deficiency anemia    Weakness of right upper extremity    Acute osteomyelitis of lumbar spine (HCC)    Acute respiratory failure with hypercapnia (HCC)    Anxiety    Chronic diastolic heart failure (HCC)    Cutaneous lupus erythematosus    Discitis of lumbar region    Morbid obesity (HCC)    MEET (obstructive sleep apnea)    Plantar fasciitis, left    Sepsis (HCC)    Hypomagnesemia    Hypokalemia    Pneumonia    Abscess of left thigh    Abscess    Hypoxemia    Septicemia ([facility])    Patient Vitals for the past 8 hrs:   BP Temp Temp src Pulse Resp SpO2   24 0741 125/75 99.9 °F (37.7 °C) Oral 93 20 96 %   24 0219 (!) 119/55 98.3 °F (36.8 °C) Oral 89 20 --    Temp (24hrs), Av.8 °F (37.1 °C), Min:98.3 °F (36.8 °C), Max:99.9 °F (37.7 °C)    Body mass index is 52.46 kg/m².    Lab Results   Component Value Date/Time    HGB 8.5 2024 04:16 AM          S/P Procedure(s) (LRB):  LEFT LEG

## 2024-03-26 NOTE — ICUWATCH
RRT Clinical Rounding Nurse Update    Vitals:    03/26/24 0130 03/26/24 0219 03/26/24 0741 03/26/24 1412   BP:  (!) 119/55 125/75    Pulse: 90 89 93    Resp: 23 20 20 18   Temp:  98.3 °F (36.8 °C) 99.9 °F (37.7 °C)    TempSrc:  Oral Oral    SpO2: 96%  96%    Weight:       Height:            DETERIORATION INDEX SCORE: 30    ASSESSMENT:  Previous outreach assessment was reviewed. There have been no significant changes since previous assessment.  Pt resting quietly in bed, in NAD, on room air.  Denies needs/complaints.    PLAN:  Will discharge from RRT Clinical Rounding Program per protocol. Please call if needed. Pt accepted at [facility] once medically cleared.    Geovanna Hargrove RN  [facility]: [phone]  [facility]: [phone]

## 2024-03-26 NOTE — CARE COORDINATION
UPDATE:  CM supervisor updated this CM that billing codes have been corrected.      CM notified by [facility] liaison that they can accept patient today if ICU billing codes are corrected in the code integration section of EPIC.  CM supervisor and director messaged for assistance and attending notified of hopeful discharge today. Per attending, orthopedics and I.D. still have plans while patient is in short-term acute care and therefore patient will need to remain here until resolved.  [facility]indy liaison updated and family is agreeable to this discharge plan per attending.

## 2024-03-27 ENCOUNTER — APPOINTMENT (OUTPATIENT)
Dept: CT IMAGING | Age: 74
DRG: 871 | End: 2024-03-27
Payer: MEDICARE

## 2024-03-27 LAB
ACID FAST STN SPEC: NEGATIVE
ANION GAP SERPL CALC-SCNC: 5 MMOL/L (ref 2–11)
APPEARANCE UR: CLEAR
BACTERIA URNS QL MICRO: NEGATIVE /HPF
BASOPHILS # BLD: 0 K/UL (ref 0–0.2)
BASOPHILS NFR BLD: 0 % (ref 0–2)
BILIRUB UR QL: NEGATIVE
BUN SERPL-MCNC: 13 MG/DL (ref 8–23)
CALCIUM SERPL-MCNC: 9.9 MG/DL (ref 8.3–10.4)
CASTS URNS QL MICRO: ABNORMAL /LPF
CHLORIDE SERPL-SCNC: 101 MMOL/L (ref 103–113)
CO2 SERPL-SCNC: 32 MMOL/L (ref 21–32)
COLOR UR: ABNORMAL
CREAT SERPL-MCNC: 1.3 MG/DL (ref 0.6–1)
DIFFERENTIAL METHOD BLD: ABNORMAL
EOSINOPHIL # BLD: 0.3 K/UL (ref 0–0.8)
EOSINOPHIL NFR BLD: 3 % (ref 0.5–7.8)
EPI CELLS #/AREA URNS HPF: ABNORMAL /HPF
ERYTHROCYTE [DISTWIDTH] IN BLOOD BY AUTOMATED COUNT: 18.7 % (ref 11.9–14.6)
GLUCOSE SERPL-MCNC: 98 MG/DL (ref 65–100)
GLUCOSE UR STRIP.AUTO-MCNC: NEGATIVE MG/DL
HCT VFR BLD AUTO: 27.9 % (ref 35.8–46.3)
HGB BLD-MCNC: 8.3 G/DL (ref 11.7–15.4)
HGB UR QL STRIP: NEGATIVE
IMM GRANULOCYTES # BLD AUTO: 0.3 K/UL (ref 0–0.5)
IMM GRANULOCYTES NFR BLD AUTO: 3 % (ref 0–5)
KETONES UR QL STRIP.AUTO: NEGATIVE MG/DL
LEUKOCYTE ESTERASE UR QL STRIP.AUTO: ABNORMAL
LYMPHOCYTES # BLD: 1.1 K/UL (ref 0.5–4.6)
LYMPHOCYTES NFR BLD: 11 % (ref 13–44)
MAGNESIUM SERPL-MCNC: 1.6 MG/DL (ref 1.8–2.4)
MCH RBC QN AUTO: 27.6 PG (ref 26.1–32.9)
MCHC RBC AUTO-ENTMCNC: 29.7 G/DL (ref 31.4–35)
MCV RBC AUTO: 92.7 FL (ref 82–102)
MONOCYTES # BLD: 1.3 K/UL (ref 0.1–1.3)
MONOCYTES NFR BLD: 13 % (ref 4–12)
MUCOUS THREADS URNS QL MICRO: 0 /LPF
MYCOBACTERIUM SPEC QL CULT: NORMAL
NEUTS SEG # BLD: 7.3 K/UL (ref 1.7–8.2)
NEUTS SEG NFR BLD: 70 % (ref 43–78)
NITRITE UR QL STRIP.AUTO: NEGATIVE
NRBC # BLD: 0 K/UL (ref 0–0.2)
PH UR STRIP: 5.5 (ref 5–9)
PLATELET # BLD AUTO: 325 K/UL (ref 150–450)
PMV BLD AUTO: 10 FL (ref 9.4–12.3)
POTASSIUM SERPL-SCNC: 3.2 MMOL/L (ref 3.5–5.1)
PROT UR STRIP-MCNC: NEGATIVE MG/DL
RBC # BLD AUTO: 3.01 M/UL (ref 4.05–5.2)
RBC #/AREA URNS HPF: ABNORMAL /HPF
SODIUM SERPL-SCNC: 138 MMOL/L (ref 136–146)
SP GR UR REFRACTOMETRY: 1.01 (ref 1–1.02)
SPECIMEN PREPARATION: NORMAL
SPECIMEN SOURCE: NORMAL
URINE CULTURE IF INDICATED: ABNORMAL
UROBILINOGEN UR QL STRIP.AUTO: 0.2 EU/DL (ref 0.2–1)
WBC # BLD AUTO: 10.4 K/UL (ref 4.3–11.1)
WBC URNS QL MICRO: ABNORMAL /HPF

## 2024-03-27 PROCEDURE — 83735 ASSAY OF MAGNESIUM: CPT

## 2024-03-27 PROCEDURE — 81001 URINALYSIS AUTO W/SCOPE: CPT

## 2024-03-27 PROCEDURE — 6370000000 HC RX 637 (ALT 250 FOR IP): Performed by: ORTHOPAEDIC SURGERY

## 2024-03-27 PROCEDURE — 2500000003 HC RX 250 WO HCPCS: Performed by: INTERNAL MEDICINE

## 2024-03-27 PROCEDURE — 6370000000 HC RX 637 (ALT 250 FOR IP): Performed by: INTERNAL MEDICINE

## 2024-03-27 PROCEDURE — 6370000000 HC RX 637 (ALT 250 FOR IP): Performed by: PHYSICIAN ASSISTANT

## 2024-03-27 PROCEDURE — 85025 COMPLETE CBC W/AUTO DIFF WBC: CPT

## 2024-03-27 PROCEDURE — 2580000003 HC RX 258: Performed by: ORTHOPAEDIC SURGERY

## 2024-03-27 PROCEDURE — 1100000000 HC RM PRIVATE

## 2024-03-27 PROCEDURE — 36415 COLL VENOUS BLD VENIPUNCTURE: CPT

## 2024-03-27 PROCEDURE — 80048 BASIC METABOLIC PNL TOTAL CA: CPT

## 2024-03-27 PROCEDURE — 6360000002 HC RX W HCPCS: Performed by: STUDENT IN AN ORGANIZED HEALTH CARE EDUCATION/TRAINING PROGRAM

## 2024-03-27 PROCEDURE — 97110 THERAPEUTIC EXERCISES: CPT

## 2024-03-27 PROCEDURE — 2580000003 HC RX 258: Performed by: INTERNAL MEDICINE

## 2024-03-27 PROCEDURE — 6370000000 HC RX 637 (ALT 250 FOR IP): Performed by: STUDENT IN AN ORGANIZED HEALTH CARE EDUCATION/TRAINING PROGRAM

## 2024-03-27 PROCEDURE — 6360000002 HC RX W HCPCS: Performed by: INTERNAL MEDICINE

## 2024-03-27 PROCEDURE — 70450 CT HEAD/BRAIN W/O DYE: CPT

## 2024-03-27 PROCEDURE — 97605 NEG PRS WND THER DME<=50SQCM: CPT

## 2024-03-27 RX ORDER — TRAZODONE HYDROCHLORIDE 50 MG/1
150 TABLET ORAL NIGHTLY
Status: DISCONTINUED | OUTPATIENT
Start: 2024-03-27 | End: 2024-03-28 | Stop reason: HOSPADM

## 2024-03-27 RX ORDER — LORAZEPAM 1 MG/1
1 TABLET ORAL NIGHTLY
Status: DISCONTINUED | OUTPATIENT
Start: 2024-03-27 | End: 2024-03-28 | Stop reason: HOSPADM

## 2024-03-27 RX ORDER — MAGNESIUM SULFATE IN WATER 40 MG/ML
2000 INJECTION, SOLUTION INTRAVENOUS ONCE
Status: COMPLETED | OUTPATIENT
Start: 2024-03-27 | End: 2024-03-27

## 2024-03-27 RX ORDER — LEVOFLOXACIN 500 MG/1
750 TABLET, FILM COATED ORAL DAILY
Status: DISCONTINUED | OUTPATIENT
Start: 2024-03-28 | End: 2024-03-28 | Stop reason: HOSPADM

## 2024-03-27 RX ADMIN — FERROUS SULFATE TAB 325 MG (65 MG ELEMENTAL FE) 325 MG: 325 (65 FE) TAB at 08:48

## 2024-03-27 RX ADMIN — TRAZODONE HYDROCHLORIDE 150 MG: 50 TABLET ORAL at 22:02

## 2024-03-27 RX ADMIN — TAMSULOSIN HYDROCHLORIDE 0.4 MG: 0.4 CAPSULE ORAL at 08:48

## 2024-03-27 RX ADMIN — SODIUM CHLORIDE, PRESERVATIVE FREE 10 ML: 5 INJECTION INTRAVENOUS at 22:00

## 2024-03-27 RX ADMIN — TORSEMIDE 100 MG: 100 TABLET ORAL at 08:48

## 2024-03-27 RX ADMIN — ANTI-FUNGAL POWDER MICONAZOLE NITRATE TALC FREE: 1.42 POWDER TOPICAL at 22:05

## 2024-03-27 RX ADMIN — CEFEPIME 2000 MG: 2 INJECTION, POWDER, FOR SOLUTION INTRAVENOUS at 08:57

## 2024-03-27 RX ADMIN — PANTOPRAZOLE SODIUM 40 MG: 40 TABLET, DELAYED RELEASE ORAL at 06:07

## 2024-03-27 RX ADMIN — ANTI-FUNGAL POWDER MICONAZOLE NITRATE TALC FREE: 1.42 POWDER TOPICAL at 08:58

## 2024-03-27 RX ADMIN — HYDROMORPHONE HYDROCHLORIDE 0.5 MG: 1 INJECTION, SOLUTION INTRAMUSCULAR; INTRAVENOUS; SUBCUTANEOUS at 13:09

## 2024-03-27 RX ADMIN — CEFEPIME 2000 MG: 2 INJECTION, POWDER, FOR SOLUTION INTRAVENOUS at 03:06

## 2024-03-27 RX ADMIN — PREDNISONE 5 MG: 5 TABLET ORAL at 08:48

## 2024-03-27 RX ADMIN — SODIUM CHLORIDE, PRESERVATIVE FREE 10 ML: 5 INJECTION INTRAVENOUS at 08:59

## 2024-03-27 RX ADMIN — ESCITALOPRAM OXALATE 10 MG: 10 TABLET ORAL at 08:50

## 2024-03-27 RX ADMIN — ASPIRIN 81 MG: 81 TABLET, COATED ORAL at 08:50

## 2024-03-27 RX ADMIN — OXYCODONE 5 MG: 5 TABLET ORAL at 06:07

## 2024-03-27 RX ADMIN — SODIUM CHLORIDE, PRESERVATIVE FREE 10 ML: 5 INJECTION INTRAVENOUS at 22:03

## 2024-03-27 RX ADMIN — SODIUM CHLORIDE: 900 INJECTION, SOLUTION INTRAVENOUS at 02:10

## 2024-03-27 RX ADMIN — POTASSIUM CHLORIDE 40 MEQ: 1500 TABLET, EXTENDED RELEASE ORAL at 14:13

## 2024-03-27 RX ADMIN — CEFEPIME 2000 MG: 2 INJECTION, POWDER, FOR SOLUTION INTRAVENOUS at 17:16

## 2024-03-27 RX ADMIN — FOLIC ACID 1 MG: 1 TABLET ORAL at 08:48

## 2024-03-27 RX ADMIN — LORAZEPAM 1 MG: 1 TABLET ORAL at 22:02

## 2024-03-27 RX ADMIN — MAGNESIUM SULFATE HEPTAHYDRATE 2000 MG: 40 INJECTION, SOLUTION INTRAVENOUS at 17:14

## 2024-03-27 RX ADMIN — ALLOPURINOL 100 MG: 100 TABLET ORAL at 08:48

## 2024-03-27 RX ADMIN — Medication 3 MG: at 22:02

## 2024-03-27 ASSESSMENT — PAIN SCALES - GENERAL
PAINLEVEL_OUTOF10: 5
PAINLEVEL_OUTOF10: 0

## 2024-03-27 ASSESSMENT — PAIN DESCRIPTION - PAIN TYPE: TYPE: ACUTE PAIN

## 2024-03-27 ASSESSMENT — PAIN DESCRIPTION - ONSET: ONSET: GRADUAL

## 2024-03-27 ASSESSMENT — PAIN DESCRIPTION - LOCATION: LOCATION: GENERALIZED

## 2024-03-27 ASSESSMENT — PAIN - FUNCTIONAL ASSESSMENT: PAIN_FUNCTIONAL_ASSESSMENT: PREVENTS OR INTERFERES SOME ACTIVE ACTIVITIES AND ADLS

## 2024-03-27 ASSESSMENT — PAIN DESCRIPTION - FREQUENCY: FREQUENCY: INTERMITTENT

## 2024-03-27 ASSESSMENT — PAIN DESCRIPTION - DESCRIPTORS: DESCRIPTORS: ACHING;SORE

## 2024-03-27 NOTE — PROGRESS NOTES
Hospitalist Progress Note   Admit Date:  3/23/2024 10:10 AM   Name:  Ama Chu   Age:  73 y.o.  Sex:  female  :  1950   MRN:  [id number]   Room:  [facility]/    Presenting/Chief Complaint: Abscess (Left femur - surgical site)     Reason(s) for Admission: Septicemia (HCC) [A41.9]  Abscess of left thigh [L02.416]  Sepsis (HCC) [A41.9]  Pneumonia of left lower lobe due to infectious organism [J18.9]  Infection associated with internal fixation device, unspecified bone, initial encounter ([facility]) [T84.60XA]  Abscess [L02.91]     Hospital Course:   Patient is a 72 y/o female with medical history of cutaneous lupus/RA, morbid obesity, bedbound status, hypertension, depression, iron deficiency anemia, diastolic heart failure, CKD III, left femoral fracture s/p ORIF with rashad placement (2023) who presented to ED 3/23 from short term rehab facility with cc left thigh pain, swelling, and drainage. Recent admission for RUE weakness (-3/3) with negative stroke workup. ED evaluation 3/21/24 with acute respiratory failure due to pneumonia with admission requested but patient declined admission - discharged with antibiotics and steroids.     Tachycardia and tachypneic on arrival. Afebrile.  Labs on admission notable for hypokalemia and normocytic anemia otherwise unremarkable. LA, procal, and WBC wnl.  CXR revealed consolidation in LLL.  CT of left thigh revealed abscess and likely infected hardware, concern of acute distal comminuted fracture as well.  CT chest obtained negative for PE, LLL consolidative atelectasis noted.  Patient met sepsis criteria with tachycardia, tachypnea; not severe. Blood cultures obtained. Initiated on Zosyn and Vancomycin. Did receive dose of Cefazolin and Gentamicin as well.  Hospitalist admitted for sepsis due to left thigh abscess. Orthopedics consulted.    Patient underwent I&D of left distal thigh abscess surrounding fixation of distal femur fracture, wound vac application, and     Eosinophils % 3 0.5 - 7.8 %    Basophils % 0 0.0 - 2.0 %    Immature Granulocytes 3 0.0 - 5.0 %    Neutrophils Absolute 7.3 1.7 - 8.2 K/UL    Lymphocytes Absolute 1.1 0.5 - 4.6 K/UL    Monocytes Absolute 1.3 0.1 - 1.3 K/UL    Eosinophils Absolute 0.3 0.0 - 0.8 K/UL    Basophils Absolute 0.0 0.0 - 0.2 K/UL    Absolute Immature Granulocyte 0.3 0.0 - 0.5 K/UL   Basic Metabolic Panel    Collection Time: 03/27/24  6:51 AM   Result Value Ref Range    Sodium 138 136 - 146 mmol/L    Potassium 3.2 (L) 3.5 - 5.1 mmol/L    Chloride 101 (L) 103 - 113 mmol/L    CO2 32 21 - 32 mmol/L    Anion Gap 5 2 - 11 mmol/L    Glucose 98 65 - 100 mg/dL    BUN 13 8 - 23 MG/DL    Creatinine 1.30 (H) 0.6 - 1.0 MG/DL    Est, Glom Filt Rate 44 (L) >60 ml/min/1.73m2    Calcium 9.9 8.3 - 10.4 MG/DL       No results for input(s): \"COVID19\" in the last 72 hours.    Current Meds:  Current Facility-Administered Medications   Medication Dose Route Frequency    LORazepam (ATIVAN) tablet 1 mg  1 mg Oral Nightly    miconazole (MICOTIN) 2 % powder   Topical BID    pantoprazole (PROTONIX) tablet 40 mg  40 mg Oral QAM AC    ceFEPIme (MAXIPIME) 2,000 mg in sodium chloride 0.9 % 100 mL IVPB (mini-bag)  2,000 mg IntraVENous Q8H    HYDROmorphone HCl PF (DILAUDID) injection 0.5 mg  0.5 mg IntraVENous Q3H PRN    sodium chloride flush 0.9 % injection 5-40 mL  5-40 mL IntraVENous 2 times per day    sodium chloride flush 0.9 % injection 5-40 mL  5-40 mL IntraVENous PRN    0.9 % sodium chloride infusion   IntraVENous PRN    potassium chloride (KLOR-CON M) extended release tablet 40 mEq  40 mEq Oral PRN    Or    potassium bicarb-citric acid (EFFER-K) effervescent tablet 40 mEq  40 mEq Oral PRN    Or    potassium chloride 10 mEq/100 mL IVPB (Peripheral Line)  10 mEq IntraVENous PRN    magnesium sulfate 2000 mg in 50 mL IVPB premix  2,000 mg IntraVENous PRN    polyethylene glycol (GLYCOLAX) packet 17 g  17 g Oral Daily PRN    acetaminophen (TYLENOL) tablet 650 mg  650

## 2024-03-27 NOTE — PROGRESS NOTES
ACUTE OCCUPATIONAL THERAPY GOALS:   (Developed with and agreed upon by patient and/or caregiver.)  1. Patient will complete upper body bathing and dressing with MIN A and adaptive equipment as needed.   2. Patient will complete self-grooming tasks with SET-UP and adaptive equipment as needed.  3. Patient will tolerate 30 minutes of OT treatment with 1-2 rest breaks to increase activity tolerance for ADLs.   4. Patient will complete functional transfers with MOD A and adaptive equipment as needed.   5. Patient will complete functional activity while seated edge of bed with MIN A and adaptive equipment as needed.  6. Patient will tolerate 15 minutes BUE therapeutic activities to increase use of BUE during ADL performance.      Timeframe: 7 visits    OCCUPATIONAL THERAPY: Daily Note PM   OT Visit Days: 2   Time In/Out  OT Charge Capture  Rehab Caseload Tracker  OT Orders    Ama Chu is a 73 y.o. female   PRIMARY DIAGNOSIS: Sepsis ([facility])  Septicemia ([facility]) [A41.9]  Abscess of left thigh [L02.416]  Sepsis ([facility]) [A41.9]  Pneumonia of left lower lobe due to infectious organism [J18.9]  Infection associated with internal fixation device, unspecified bone, initial encounter ([facility]) [T84.60XA]  Abscess [L02.91]  Procedure(s) (LRB):  LEFT LEG INCISION AND DRAINAGE OF LEFT THIGH ABSCESS, WITH PLACEMENT OF WOUND VAC (Left)  4 Days Post-Op  Inpatient: Payor: MEDICARE / Plan: MEDICARE PART A AND B / Product Type: *No Product type* /     ASSESSMENT:     REHAB RECOMMENDATIONS:   Recommendation to date pending progress:  Setting:  Long Term Acute Care Facility     Equipment:    To Be Determined     ASSESSMENT:  Ms. hCu is doing fair today. Pt presents supine upon arrival. Pt performed UE exercises while sitting up in bed. Pt tolerated exercises well with brief rest breaks in between sets. Pt encouraged to perform more exercises later. Making minimal progress with goals at this time. Will continue to benefit from skilled OT  [] [] [] [] [x]     Toileting [] [] [] [] [] [] [] [] [] [x]    Upper Body Dressing [] [] [] [] [] [] [] [] [] [x]    Lower Body Dressing [] [] [] [] [] [] [] [] [] [x]    Feeding [] [] [] [] [] [] [] [] [] [x]    Grooming [] [] [] [] [] [] [] [] [] [x]    Personal Device Care [] [] [] [] [] [] [] [] [] [x]    Functional Mobility [] [] [] [] [] [] [] [] [] [x]    I=Independent, Mod I=Modified Independent, S=Supervision/Setup, SBA=Standby Assistance, CGA=Contact Guard Assistance, Min=Minimal Assistance, Mod=Moderate Assistance, Max=Maximal Assistance, Total=Total Assistance, NT=Not Tested    BALANCE: Good Fair+ Fair Fair- Poor NT Comments   Sitting Static [] [x] [] [] [] []    Sitting Dynamic [] [x] [] [] [] []              Standing Static [] [] [] [] [] [x]    Standing Dynamic [] [] [] [] [] [x]        PLAN:     FREQUENCY/DURATION   OT Plan of Care: 3 times/week for duration of hospital stay or until stated goals are met, whichever comes first.    TREATMENT:     TREATMENT:   Therapeutic Exercise (13 Minutes): Therapeutic exercises noted below to improve functional activity tolerance, strength, and mobility.     TREATMENT GRID:   Date:  3/27/24 Date:   Date:     Activity/Exercise Parameters Parameters Parameters   Shoulder Abd/Adduction 10 reps     Shoulder flexion 10 reps     Elbow Flexion 10 reps     Punches  10 reps     Lat Pull downs 10 reps                     AFTER TREATMENT PRECAUTIONS: Bed, Bed/Chair Locked, Call light within reach, Needs within reach, and Visitors at bedside    INTERDISCIPLINARY COLLABORATION:  RN/ PCT and OT/ REYNA    EDUCATION:       TOTAL TREATMENT DURATION AND TIME:  Time In: 1415  Time Out: 1428  Minutes: 13    JABARI Burgos

## 2024-03-27 NOTE — DISCHARGE INSTRUCTIONS
Please space Levaquin abx 2 hours apart from Iron, magnesium, calcium such as Tums/dairy products such as yogurt, cheese. Etc.

## 2024-03-27 NOTE — PROGRESS NOTES
Infectious Disease Progress Note    Today's Date: 3/27/2024   Admit Date: 3/23/2024  1950  Chief Complaint:    Impression:   Left thigh abscess s/p I&D 03/23; cultures with E coli. FQ-S;   S/p ORIF of left femur with rashad placement 01/24/24  Cutaneous lupus - on Embrel, methotrexate, prednisone and Plaquinil. Immunosuppression may complicate healing.   Chronic congestive heart failure.   Altered mental status  CKD    Plan:   Mental status change per daughter, CT without acute intracranial abnormality.   Recommend stop Cefepime and change to Levaquin 750mg po daily for at least 6 weeks EOT 5/6/24 given femur ORIF with hardware. Discussed with pharmacist, based on her weight and CrCl, higher dose is appropriate for her.   ID will arrange for outpatient ID follow up in the office in 6 weeks to reassess continuation of abx or not.     Anti-infectives:   Cefepime (03/25-)  Vancomycin (03/23-)  Doxycycline IV (03/23-)  Piperaclllin/tazobactam (03/23-03/25)    Subjective:   Interval Events:  WBC of 10.4. Cr of 1.30 today. Afebrile. Daughter at bedside concerned mentation changes and infection in the brain/CNS. Pt had CT head done without acute abnormality. No stiff neck or fevers or chills. No diarrhea.     Patient Active Problem List   Diagnosis    Closed disp comminuted fracture of shaft of left femur with malunion    Rheumatoid arthritis (HCC)    Hypertension    Depression    Neuropathy    CKD (chronic kidney disease) stage 3, GFR 30-59 ml/min ([facility])    Iron deficiency anemia    Weakness of right upper extremity    Acute osteomyelitis of lumbar spine ([facility])    Acute respiratory failure with hypercapnia ([facility])    Anxiety    Chronic diastolic heart failure ([facility])    Cutaneous lupus erythematosus    Discitis of lumbar region    Morbid obesity ([facility])    MEET (obstructive sleep apnea)    Plantar fasciitis, left    Sepsis ([facility])    Hypomagnesemia    Hypokalemia    Pneumonia    Abscess of left thigh    Abscess    Hypoxemia     Septicemia (HCC)    Immunocompromised state (HCC)     History reviewed. No pertinent past medical history.   No family history on file.   Social History     Tobacco Use    Smoking status: Former     Types: Cigarettes    Smokeless tobacco: Never   Substance Use Topics    Alcohol use: Not on file     Past Surgical History:   Procedure Laterality Date    LEG SURGERY Left 1/24/2024    LEFT FEMUR IM NAIL RUFUS INSERTION RETROGRADE performed by Carter Braga MD at [facility] MAIN OR    LEG SURGERY Left 3/23/2024    LEFT LEG INCISION AND DRAINAGE OF LEFT THIGH ABSCESS, WITH PLACEMENT OF WOUND VAC performed by Balaji Gutierrez MD at [facility] MAIN OR    UPPER GASTROINTESTINAL ENDOSCOPY N/A 1/27/2024    EGD ESOPHAGOGASTRODUODENOSCOPY performed by Sri Shaw MD at [facility] ENDOSCOPY      Prior to Admission medications    Medication Sig Start Date End Date Taking? Authorizing Provider   doxycycline hyclate (VIBRA-TABS) 100 MG tablet Take 1 tablet by mouth 2 times daily for 7 days 3/21/24 3/28/24  Rei Fields MD   methylPREDNISolone (MEDROL DOSEPACK) 4 MG tablet Take by mouth as directed on pack 3/21/24   Rei Fields MD   aspirin 81 MG chewable tablet Take 1 tablet by mouth daily for 17 days 3/4/24 3/21/24  Yuliet Rocha MD   ondansetron (ZOFRAN-ODT) 4 MG disintegrating tablet Take 1 tablet by mouth every 8 hours as needed for Nausea or Vomiting 3/3/24   Yuliet Rocha MD   clopidogrel (PLAVIX) 75 MG tablet Take 1 tablet by mouth daily for 17 days 3/4/24 3/21/24  Yuliet Rocha MD   nicotine (NICODERM CQ) 14 MG/24HR Place 1 patch onto the skin daily as needed (smoking cravings) 3/3/24   Yuliet Rocha MD   celecoxib (CELEBREX) 100 MG capsule Take 1 capsule by mouth daily    Anita Vee MD   polyethylene glycol (MIRALAX) 17 g PACK packet Take 17 g by mouth daily    Antia Vee MD   sennosides-docusate sodium (SENOKOT-S) 8.6-50 MG tablet Take 2 tablets by mouth daily    Provider

## 2024-03-27 NOTE — WOUND CARE
Patient seen for wound VAC dressing change to left lateral thigh. Wound clean and granulating. Noted few antibiotic beads in base. Wound improving. Surgeon aware of updated pic placed in chart for his review. Will plan routine change Friday if she remains in acute care.     Left thigh

## 2024-03-28 VITALS
SYSTOLIC BLOOD PRESSURE: 116 MMHG | DIASTOLIC BLOOD PRESSURE: 54 MMHG | RESPIRATION RATE: 16 BRPM | TEMPERATURE: 98.2 F | BODY MASS INDEX: 44.41 KG/M2 | WEIGHT: 293 LBS | HEIGHT: 68 IN | OXYGEN SATURATION: 96 % | HEART RATE: 93 BPM

## 2024-03-28 LAB
ANION GAP SERPL CALC-SCNC: 6 MMOL/L (ref 2–11)
BACTERIA SPEC CULT: NORMAL
BACTERIA SPEC CULT: NORMAL
BUN SERPL-MCNC: 14 MG/DL (ref 8–23)
CALCIUM SERPL-MCNC: 9.3 MG/DL (ref 8.3–10.4)
CHLORIDE SERPL-SCNC: 100 MMOL/L (ref 103–113)
CO2 SERPL-SCNC: 31 MMOL/L (ref 21–32)
CREAT SERPL-MCNC: 1.4 MG/DL (ref 0.6–1)
FUNGAL CULT/SMEAR: NORMAL
FUNGAL CULT/SMEAR: NORMAL
FUNGUS SMEAR: NORMAL
FUNGUS SMEAR: NORMAL
GLUCOSE SERPL-MCNC: 97 MG/DL (ref 65–100)
HCT VFR BLD AUTO: 25.5 % (ref 35.8–46.3)
HGB BLD-MCNC: 7.8 G/DL (ref 11.7–15.4)
MAGNESIUM SERPL-MCNC: 1.6 MG/DL (ref 1.8–2.4)
POTASSIUM SERPL-SCNC: 3.3 MMOL/L (ref 3.5–5.1)
SERVICE CMNT-IMP: NORMAL
SERVICE CMNT-IMP: NORMAL
SODIUM SERPL-SCNC: 137 MMOL/L (ref 136–146)
SPECIMEN PROCESSING: NORMAL
SPECIMEN PROCESSING: NORMAL
SPECIMEN SOURCE: NORMAL

## 2024-03-28 PROCEDURE — 85018 HEMOGLOBIN: CPT

## 2024-03-28 PROCEDURE — 85014 HEMATOCRIT: CPT

## 2024-03-28 PROCEDURE — 83735 ASSAY OF MAGNESIUM: CPT

## 2024-03-28 PROCEDURE — 36415 COLL VENOUS BLD VENIPUNCTURE: CPT

## 2024-03-28 PROCEDURE — 6370000000 HC RX 637 (ALT 250 FOR IP): Performed by: STUDENT IN AN ORGANIZED HEALTH CARE EDUCATION/TRAINING PROGRAM

## 2024-03-28 PROCEDURE — 6370000000 HC RX 637 (ALT 250 FOR IP): Performed by: ORTHOPAEDIC SURGERY

## 2024-03-28 PROCEDURE — 2580000003 HC RX 258: Performed by: INTERNAL MEDICINE

## 2024-03-28 PROCEDURE — 2580000003 HC RX 258: Performed by: ORTHOPAEDIC SURGERY

## 2024-03-28 PROCEDURE — 6370000000 HC RX 637 (ALT 250 FOR IP): Performed by: INTERNAL MEDICINE

## 2024-03-28 PROCEDURE — 6370000000 HC RX 637 (ALT 250 FOR IP): Performed by: PHYSICIAN ASSISTANT

## 2024-03-28 PROCEDURE — 80048 BASIC METABOLIC PNL TOTAL CA: CPT

## 2024-03-28 PROCEDURE — 6360000002 HC RX W HCPCS: Performed by: STUDENT IN AN ORGANIZED HEALTH CARE EDUCATION/TRAINING PROGRAM

## 2024-03-28 PROCEDURE — 6370000000 HC RX 637 (ALT 250 FOR IP): Performed by: NURSE PRACTITIONER

## 2024-03-28 RX ORDER — LEVOFLOXACIN 750 MG/1
750 TABLET, FILM COATED ORAL DAILY
Qty: 38 TABLET | Refills: 0
Start: 2024-03-29 | End: 2024-05-06

## 2024-03-28 RX ORDER — MAGNESIUM SULFATE IN WATER 40 MG/ML
2000 INJECTION, SOLUTION INTRAVENOUS ONCE
Status: COMPLETED | OUTPATIENT
Start: 2024-03-28 | End: 2024-03-28

## 2024-03-28 RX ORDER — PREDNISONE 5 MG/1
5 TABLET ORAL DAILY
COMMUNITY

## 2024-03-28 RX ORDER — LANOLIN ALCOHOL/MO/W.PET/CERES
400 CREAM (GRAM) TOPICAL DAILY
Qty: 30 TABLET | Refills: 0
Start: 2024-03-29 | End: 2024-04-28

## 2024-03-28 RX ORDER — SODIUM CHLORIDE, SODIUM LACTATE, POTASSIUM CHLORIDE, CALCIUM CHLORIDE 600; 310; 30; 20 MG/100ML; MG/100ML; MG/100ML; MG/100ML
INJECTION, SOLUTION INTRAVENOUS CONTINUOUS
Status: DISCONTINUED | OUTPATIENT
Start: 2024-03-28 | End: 2024-03-28 | Stop reason: HOSPADM

## 2024-03-28 RX ORDER — LANOLIN ALCOHOL/MO/W.PET/CERES
400 CREAM (GRAM) TOPICAL DAILY
Status: DISCONTINUED | OUTPATIENT
Start: 2024-03-28 | End: 2024-03-28 | Stop reason: HOSPADM

## 2024-03-28 RX ADMIN — ALLOPURINOL 100 MG: 100 TABLET ORAL at 08:32

## 2024-03-28 RX ADMIN — LEVOFLOXACIN 750 MG: 500 TABLET, FILM COATED ORAL at 08:31

## 2024-03-28 RX ADMIN — METOPROLOL SUCCINATE 12.5 MG: 25 TABLET, FILM COATED, EXTENDED RELEASE ORAL at 08:31

## 2024-03-28 RX ADMIN — ASPIRIN 81 MG: 81 TABLET, COATED ORAL at 08:33

## 2024-03-28 RX ADMIN — TAMSULOSIN HYDROCHLORIDE 0.4 MG: 0.4 CAPSULE ORAL at 08:34

## 2024-03-28 RX ADMIN — FERROUS SULFATE TAB 325 MG (65 MG ELEMENTAL FE) 325 MG: 325 (65 FE) TAB at 08:34

## 2024-03-28 RX ADMIN — SODIUM CHLORIDE, PRESERVATIVE FREE 10 ML: 5 INJECTION INTRAVENOUS at 08:41

## 2024-03-28 RX ADMIN — SODIUM CHLORIDE, PRESERVATIVE FREE 10 ML: 5 INJECTION INTRAVENOUS at 08:30

## 2024-03-28 RX ADMIN — MAGNESIUM SULFATE HEPTAHYDRATE 2000 MG: 40 INJECTION, SOLUTION INTRAVENOUS at 08:39

## 2024-03-28 RX ADMIN — ANTI-FUNGAL POWDER MICONAZOLE NITRATE TALC FREE: 1.42 POWDER TOPICAL at 08:39

## 2024-03-28 RX ADMIN — FOLIC ACID 1 MG: 1 TABLET ORAL at 08:33

## 2024-03-28 RX ADMIN — SODIUM CHLORIDE: 900 INJECTION, SOLUTION INTRAVENOUS at 01:34

## 2024-03-28 RX ADMIN — PREDNISONE 5 MG: 5 TABLET ORAL at 08:33

## 2024-03-28 RX ADMIN — SODIUM CHLORIDE, POTASSIUM CHLORIDE, SODIUM LACTATE AND CALCIUM CHLORIDE: 600; 310; 30; 20 INJECTION, SOLUTION INTRAVENOUS at 14:06

## 2024-03-28 RX ADMIN — POTASSIUM BICARBONATE 40 MEQ: 782 TABLET, EFFERVESCENT ORAL at 08:34

## 2024-03-28 RX ADMIN — ESCITALOPRAM OXALATE 10 MG: 10 TABLET ORAL at 08:32

## 2024-03-28 RX ADMIN — MAGNESIUM GLUCONATE 500 MG ORAL TABLET 400 MG: 500 TABLET ORAL at 08:33

## 2024-03-28 RX ADMIN — PANTOPRAZOLE SODIUM 40 MG: 40 TABLET, DELAYED RELEASE ORAL at 05:38

## 2024-03-28 RX ADMIN — TORSEMIDE 100 MG: 100 TABLET ORAL at 08:34

## 2024-03-28 NOTE — CARE COORDINATION
CM reviewed ortho and nephrology plan with [facility] admissions liaison who confirms they can admit patient today and same nephrology group will follow patient at [facility].  Attending notified via [employer].

## 2024-03-28 NOTE — PROGRESS NOTES
RN left message and call back number with  to have either primary RN or charge RN return my call for report to [facility].

## 2024-03-28 NOTE — DISCHARGE SUMMARY
Hospitalist Discharge Summary   Admit Date:  3/23/2024 10:10 AM   DC Note date: 3/28/2024  Name:  Ama Chu   Age:  73 y.o.  Sex:  female  :  1950   MRN:  [id number]   Room:  [facility]  PCP:  Jose Miguel Jimenez Jr., MD    Presenting Complaint: Abscess (Left femur - surgical site)     Initial Admission Diagnosis: Septicemia (HCC) [A41.9]  Abscess of left thigh [L02.416]  Sepsis (HCC) [A41.9]  Pneumonia of left lower lobe due to infectious organism [J18.9]  Infection associated with internal fixation device, unspecified bone, initial encounter ([facility]) [T84.60XA]  Abscess [L02.91]     Problem List for this Hospitalization (present on admission):    Principal Problem:    Sepsis ([facility])  Active Problems:    Closed disp comminuted fracture of shaft of left femur with malunion    Rheumatoid arthritis (HCC)    Hypertension    Depression    CKD (chronic kidney disease) stage 3, GFR 30-59 ml/min ([facility])    Iron deficiency anemia    Chronic diastolic heart failure (HCC)    Cutaneous lupus erythematosus    Morbid obesity ([facility])    Hypomagnesemia    Hypokalemia    Pneumonia    Abscess of left thigh    Abscess    Hypoxemia    Septicemia (HCC)    Immunocompromised state (HCC)  Resolved Problems:    * No resolved hospital problems. *      Hospital Course:  Patient is a 72 y/o female with medical history of cutaneous lupus/RA on immunosuppressants, morbid obesity, bedbound status, hypertension, depression, iron deficiency anemia, diastolic heart failure, CKD III, chronic benzodiazepine use, left femoral fracture s/p ORIF with rashad placement (2023) who presented to ED 3/23 from short term rehab facility with cc left thigh pain, swelling, and drainage. Recent admission for RUE weakness (-3/3) with negative stroke workup. ED evaluation 3/21/24 with acute respiratory failure due to pneumonia with admission requested but patient declined admission - discharged with antibiotics and steroids.      Tachycardia and tachypneic on  document resolution.     XR CERVICAL SPINE (2-3 VIEWS)    Result Date: 3/2/2024  Very limited evaluation. In the visualized portions of the cervical spine there is grade 1 anterolisthesis of C4 over C5 with degenerative changes facet arthropathy at C4-C5 and C5-C6 the lower cervical segments could not be seen on the lateral views     MRI brain without contrast    Result Date: 3/2/2024  No acute infarction, hemorrhage, or mass. Essentially normal brain MRI.     XR SHOULDER RIGHT (MIN 2 VIEWS)    Result Date: 3/1/2024  No fractures. Mild to moderate degenerative changes of the right shoulder with narrowing of the rotator interval.    CTA head neck with contrast    Result Date: 2/29/2024  1. No flow-limiting stenosis, aneurysm or dissection of the cervical carotid or cervical vertebral circulation. 2. No intracranial major proximal vessel occlusion, flow-limiting stenosis, aneurysm or AVM. INDICATION: Muscle weakness (generalized) / Has a \"code stroke\" or \"stroke alert\" been called?->Yes COMPARISON: CT head from 2/29/2024 TECHNIQUE: CT angiogram performed of the head and neck. 3D MIP coronal and sagittal images were obtained from the axial data. 50 mL of Isovue-370 was administered intravenously for purposes of this study. All CT scans at this location are performed using dose optimization techniques including the following: Automated exposure control; adjustment of the mA and/or kV; and/or use of iterative reconstruction technique. Total exam DLP is 662.06 mGycm. FINDINGS: CTA NECK: There is a classic three-vessel aortic arch configuration. The common carotid arteries have a retropharyngeal kissing type configuration. The common carotid arteries, carotid bulbs and cervical internal carotid arteries are widely patent without flow-limiting stenosis (0% stenosis per NASCET criteria). The cervical vertebral arteries are codominant and intact without flow-limiting stenosis. There is no aneurysm or dissection of the  03/23/24 1728    Order Status: Completed Specimen: Miscellaneous sample Updated: 03/26/24 1341     Sample Site ABSCESS        Comment: LEFT THIGH ABSCESS SWAB        Specimen Processing Direct Inoculation     Fungal Cult/Smear Other source received     Comment: (NOTE)  Performed At: [address] [id number]  Krishna Cabrera MD Ph:[phone]         Culture, Anaerobic [[id number]] Collected: 03/23/24 1728    Order Status: Completed Specimen: Abscess Updated: 03/27/24 0758     Special Requests NO SPECIAL REQUESTS        Culture       NO ANAEROBIC GROWTH IN 3 DAYS          Culture, Tissue [[id number]]  (Abnormal)  (Susceptibility) Collected: 03/23/24 1728    Order Status: Completed Specimen: Abscess Updated: 03/26/24 0732     Special Requests NO SPECIAL REQUESTS        Gram Stain MANY WBCS SEEN PER LPF         NO DEFINITE ORGANISM SEEN        Culture LIGHT Escherichia coli       Susceptibility        Escherichia coli      BACTERIAL SUSCEPTIBILITY PANEL HIRAL      ampicillin >16 ug/mL Resistant      aztreonam <=2 ug/mL Sensitive      ceFAZolin 2 ug/mL Sensitive      cefepime <=1 ug/mL Sensitive      cefTRIAXone <=1 ug/mL Sensitive      ciprofloxacin <=0.25 ug/mL Sensitive      gentamicin <=2 ug/mL Sensitive      levofloxacin <=0.5 ug/mL Sensitive      meropenem <=0.5 ug/mL Sensitive      piperacillin-tazobactam <=2/4 ug/mL Sensitive      tobramycin <=2 ug/mL Sensitive      trimethoprim-sulfamethoxazole >2/38 ug/mL Resistant                           Culture, Fungus [[id number]] Collected: 03/23/24 1728    Order Status: Completed Specimen: Miscellaneous sample Updated: 03/26/24 1237     Sample Site ABSCESS        Comment: LEFT THIGH ABSCESS TISSUE        Specimen Processing Tissue Grinding     Fungal Cult/Smear Other source received     Comment: (NOTE)  Performed At: [address] [id number]  Krishna Cabrera MD Ph:[phone]         AFB Culture +

## 2024-03-28 NOTE — PROGRESS NOTES
Infectious Disease Progress Note    Today's Date: 3/28/2024   Admit Date: 3/23/2024  1950  Chief Complaint:    Impression:   Left thigh abscess s/p I&D 03/23; cultures with E coli. FQ-S;   S/p ORIF of left femur with rashad placement 01/24/24  Cutaneous lupus - on Embrel, methotrexate, prednisone and Plaquinil. Immunosuppression may complicate healing.   Chronic congestive heart failure.   Altered mental status/slurred speech. CT head unremarkable. Speech and mentation seems appropriate at the time of my evaluation.   CKD    Plan:   Continue Levaquin 750mg po daily for at least 6 weeks with  EOT 5/6/24 given femur ORIF with hardware. Discussed with pharmacist again today, based on her weight and CrCl, higher dose is appropriate for her. Will need adjust as needed based on her renal function.   Outpatient ID appointment in 6 weeks on 5/6/24 at 2:40pm with Dr. Woods.   ID will sign off at this time    Anti-infectives:   Cefepime (03/25-)  Vancomycin (03/23-)  Doxycycline IV (03/23-)  Piperaclllin/tazobactam (03/23-03/25)    Subjective:   Interval Events:    WBC has been wnl. Afebrile. Pt's alert and oriented. Able to tell me the nephrologist saw her and told her the kidneys need more water. Pt denies pain or fever or chills at this time.     Patient Active Problem List   Diagnosis    Closed disp comminuted fracture of shaft of left femur with malunion    Rheumatoid arthritis ([facility])    Hypertension    Depression    Neuropathy    CKD (chronic kidney disease) stage 3, GFR 30-59 ml/min ([facility])    Iron deficiency anemia    Weakness of right upper extremity    Acute osteomyelitis of lumbar spine ([facility])    Acute respiratory failure with hypercapnia ([facility])    Anxiety    Chronic diastolic heart failure ([facility])    Cutaneous lupus erythematosus    Discitis of lumbar region    Morbid obesity ([facility])    MEET (obstructive sleep apnea)    Plantar fasciitis, left    Sepsis ([facility])    Hypomagnesemia    Hypokalemia    Pneumonia    Abscess of left  thigh    Abscess    Hypoxemia    Septicemia (HCC)    Immunocompromised state (HCC)     History reviewed. No pertinent past medical history.   No family history on file.   Social History     Tobacco Use    Smoking status: Former     Types: Cigarettes    Smokeless tobacco: Never   Substance Use Topics    Alcohol use: Not on file     Past Surgical History:   Procedure Laterality Date    LEG SURGERY Left 1/24/2024    LEFT FEMUR IM NAIL RUFUS INSERTION RETROGRADE performed by Carter Braga MD at [facility] MAIN OR    LEG SURGERY Left 3/23/2024    LEFT LEG INCISION AND DRAINAGE OF LEFT THIGH ABSCESS, WITH PLACEMENT OF WOUND VAC performed by Balaji Gutierrez MD at [facility] MAIN OR    UPPER GASTROINTESTINAL ENDOSCOPY N/A 1/27/2024    EGD ESOPHAGOGASTRODUODENOSCOPY performed by Sri Shaw MD at [facility] ENDOSCOPY      Prior to Admission medications    Medication Sig Start Date End Date Taking? Authorizing Provider   doxycycline hyclate (VIBRA-TABS) 100 MG tablet Take 1 tablet by mouth 2 times daily for 7 days 3/21/24 3/28/24  Rei Fields MD   methylPREDNISolone (MEDROL DOSEPACK) 4 MG tablet Take by mouth as directed on pack 3/21/24   Rei Fields MD   aspirin 81 MG chewable tablet Take 1 tablet by mouth daily for 17 days 3/4/24 3/21/24  Yuliet Rocha MD   ondansetron (ZOFRAN-ODT) 4 MG disintegrating tablet Take 1 tablet by mouth every 8 hours as needed for Nausea or Vomiting 3/3/24   Yuliet Rocha MD   clopidogrel (PLAVIX) 75 MG tablet Take 1 tablet by mouth daily for 17 days 3/4/24 3/21/24  Yuliet Rocha MD   nicotine (NICODERM CQ) 14 MG/24HR Place 1 patch onto the skin daily as needed (smoking cravings) 3/3/24   Yuliet Rocha MD   celecoxib (CELEBREX) 100 MG capsule Take 1 capsule by mouth daily    Anita Vee MD   polyethylene glycol (MIRALAX) 17 g PACK packet Take 17 g by mouth daily    Anita Vee MD   sennosides-docusate sodium (SENOKOT-S) 8.6-50 MG tablet Take 2  tablets by mouth daily    Anita Vee MD   ondansetron (ZOFRAN-ODT) 4 MG disintegrating tablet Take 1 tablet by mouth every 6 hours as needed for Nausea or Vomiting    Anita Vee MD   pantoprazole (PROTONIX) 40 MG tablet Take 1 tablet by mouth daily 1/30/24   Felicita Munoz MD   torsemide (DEMADEX) 100 MG tablet Take 0.5 tablets by mouth at bedtime 1/30/24   Felicita Munoz MD   doxepin (SINEQUAN) 100 MG capsule Take 150 mg by mouth nightly    Anita Vee MD   ergocalciferol (ERGOCALCIFEROL) 1.25 MG (22100 UT) capsule Take 1 capsule by mouth once a week    Anita Vee MD   escitalopram (LEXAPRO) 10 MG tablet Take 1 tablet by mouth daily    Anita Vee MD   ferrous sulfate (IRON 325) 325 (65 Fe) MG tablet Take 1 tablet by mouth daily (with breakfast)    Anita Vee MD   hydroxychloroquine (PLAQUENIL) 200 MG tablet Take 1 tablet by mouth 2 times daily    Anita Vee MD   metOLazone (ZAROXOLYN) 5 MG tablet Take 1 tablet by mouth daily as needed (for weight gain more than 2 lbs in 3 days)    Anita Vee MD   metoprolol succinate (TOPROL XL) 50 MG extended release tablet Take 1 tablet by mouth daily    Anita Vee MD   tamsulosin (FLOMAX) 0.4 MG capsule Take 1 capsule by mouth daily    Anita Vee MD   traZODone (DESYREL) 150 MG tablet Take 2 tablets by mouth nightly    Anita Vee MD   pregabalin (LYRICA) 75 MG capsule Take 1 capsule by mouth 2 times daily.    Anita Vee MD   etanercept (ENBREL) 50 MG/ML injection Inject 0.5 mLs into the skin once a week    Anita Vee MD   Methotrexate, PF, (RASUVO) 25 MG/0.5ML chemo injection pen Inject 25 mg into the skin once a week    Anita Vee MD   LORazepam (ATIVAN) 2 MG tablet Take 1 tablet by mouth nightly.    Anita Vee MD   folic acid (FOLVITE) 1 MG tablet Take 1 tablet by mouth daily    Anita Vee MD   melatonin

## 2024-03-28 NOTE — PLAN OF CARE
Problem: Pain  Goal: Verbalizes/displays adequate comfort level or baseline comfort level  3/28/2024 0828 by Evelyn Joseph RN  Outcome: Progressing  3/28/2024 0449 by Abdifatah Manrique RN  Outcome: Progressing     Problem: Skin/Tissue Integrity  Goal: Absence of new skin breakdown  Description: 1.  Monitor for areas of redness and/or skin breakdown  2.  Assess vascular access sites hourly  3.  Every 4-6 hours minimum:  Change oxygen saturation probe site  4.  Every 4-6 hours:  If on nasal continuous positive airway pressure, respiratory therapy assess nares and determine need for appliance change or resting period.  3/28/2024 0828 by Evelyn Joseph RN  Outcome: Progressing  3/28/2024 0449 by Abdifatah Manrique RN  Outcome: Progressing     Problem: Discharge Planning  Goal: Discharge to home or other facility with appropriate resources  3/28/2024 0828 by Evelyn Joseph RN  Outcome: Progressing  3/28/2024 0449 by Abdifatah Manrique RN  Outcome: Progressing     Problem: Safety - Medical Restraint  Goal: Remains free of injury from restraints (Restraint for Interference with Medical Device)  Description: INTERVENTIONS:  1. Determine that other, less restrictive measures have been tried or would not be effective before applying the restraint  2. Evaluate the patient's condition at the time of restraint application  3. Inform patient/family regarding the reason for restraint  4. Q2H: Monitor safety, psychosocial status, comfort, nutrition and hydration  3/28/2024 0828 by Evelyn Joseph RN  Outcome: Progressing  3/28/2024 0449 by Abdifatah Manrique RN  Outcome: Progressing     Problem: Safety - Adult  Goal: Free from fall injury  3/28/2024 0828 by Evelyn Joseph RN  Outcome: Progressing  3/28/2024 0449 by Abdifatah Manrique RN  Outcome: Progressing     Problem: ABCDS Injury Assessment  Goal: Absence of physical injury  3/28/2024 0828 by Evelyn Joseph RN  Outcome: Progressing  3/28/2024 0449 by

## 2024-03-28 NOTE — PROGRESS NOTES
Orthopedic update: Patient discussed with Dr. Braga who has reviewed the wound care images of the thigh wound status post I&D by Dr. Gutierrez.  The plan will be to continue wound care changes and follow-up with Dr. Gutierrez in the office.  No indication at this time for repeat I&D

## 2024-03-28 NOTE — CARE COORDINATION
Attending confirms patient is stable for transfer to [facility] today who can admit today.  Patient will discharge at 1530 via [facility] transport; reference number [id number].  Patient will admit to room 4201 and primary RN can call report to [phone].  Attending notified to call report to Sindy BROWNLEE at [phone].  Patient and daughter agreeable to discharge plan.  Primary RN will need to remove wound vac and place a wet to dry dressing on thigh.         03/28/24 1306   Service Assessment   Patient Orientation Alert and Oriented   Cognition Alert   History Provided By Patient   Primary Caregiver Family   Accompanied By/Relationship Daughter   Support Systems Children;Family Members   Patient's Healthcare Decision Maker is: Legal Next of Kin   PCP Verified by CM Yes   Last Visit to PCP Within last 3 months   Prior Functional Level Mobility;Shopping;Housework;Cooking   Current Functional Level Assistance with the following:;Bathing;Dressing;Toileting;Cooking;Housework;Shopping;Mobility   Can patient return to prior living arrangement No   Ability to make needs known: Good   Family able to assist with home care needs: Yes   Would you like for me to discuss the discharge plan with any other family members/significant others, and if so, who? Yes  (Daughter)   Financial Resources Medicare   Discharge Planning   Patient expects to be discharged to: LTAC   Services At/After Discharge   Services At/After Discharge LTAC;Transport;In ambulance   Mode of Transport at Discharge [facility]   Hospital Transport Time of Discharge 1530   Confirm Follow Up Transport Family   Condition of Participation: Discharge Planning   The Plan for Transition of Care is related to the following treatment goals: Patient will discharge to [facility] for ongoing acute care   The Patient and/or Patient Representative was provided with a Choice of Provider? Patient;Patient Representative   Name of the Patient Representative who was provided with

## 2024-03-28 NOTE — CONSULTS
[facility] Consult[facility]    Admission Date:  3/23/2024    Admission Diagnosis:  Septicemia ([facility]) [A41.9]  Abscess of left thigh [L02.416]  Sepsis ([facility]) [A41.9]  Pneumonia of left lower lobe due to infectious organism [J18.9]  Infection associated with internal fixation device, unspecified bone, initial encounter ([facility]) [T84.60XA]  Abscess [L02.91]    History of Present Illness:  Patient is a 72 y/o female with medical history of cutaneous lupus/RA, morbid obesity, bedbound status, hypertension, depression, iron deficiency anemia, diastolic heart failure, CKD III, left femoral fracture s/p ORIF with rufus placement (Jan 2023) who presented to ED 3/23 from short term rehab facility with cc left thigh pain, swelling, and drainage. Recent admission for RUE weakness (2/29-3/3) with negative stroke workup.     Patient underwent I&D of left distal thigh abscess surrounding fixation of distal femur fracture, wound vac application, and placement of antibiotic beads 3/23 with Orthopedics. Post-operatively unable to be extubated so she was transferred to ICU with Pulmonologist assisting with care. Suspected underlying sleep apnea vs OHS. She was successfully extubated and transferred to medical floor 3/25.     Infectious disease following. Final antibiotic plan: Levaquin 750 mg po daily x 6 weeks EOT 5/6/24. Prolonged duration given ORIF with hardware. ID will arrange f/u in 6 weeks and determine if duration should be prolonged.    TODD starting on 3/26.  Baseline Cr 0.5-0.6.    History reviewed. No pertinent past medical history.   Past Surgical History:   Procedure Laterality Date    LEG SURGERY Left 1/24/2024    LEFT FEMUR IM NAIL RUFUS INSERTION RETROGRADE performed by Carter Braga MD at [facility] MAIN OR    LEG SURGERY Left 3/23/2024    LEFT LEG INCISION AND DRAINAGE OF LEFT THIGH ABSCESS, WITH PLACEMENT OF WOUND VAC performed by Balaji Gutierrez MD at [facility] MAIN OR    UPPER GASTROINTESTINAL ENDOSCOPY N/A 1/27/2024     EGD ESOPHAGOGASTRODUODENOSCOPY performed by Sri Shaw MD at [facility] ENDOSCOPY      Current Facility-Administered Medications   Medication Dose Route Frequency    magnesium sulfate 2000 mg in 50 mL IVPB premix  2,000 mg IntraVENous Once    magnesium oxide (MAG-OX) tablet 400 mg  400 mg Oral Daily    LORazepam (ATIVAN) tablet 1 mg  1 mg Oral Nightly    traZODone (DESYREL) tablet 150 mg  150 mg Oral Nightly    levoFLOXacin (LEVAQUIN) tablet 750 mg  750 mg Oral Daily    miconazole (MICOTIN) 2 % powder   Topical BID    pantoprazole (PROTONIX) tablet 40 mg  40 mg Oral QAM AC    HYDROmorphone HCl PF (DILAUDID) injection 0.5 mg  0.5 mg IntraVENous Q3H PRN    sodium chloride flush 0.9 % injection 5-40 mL  5-40 mL IntraVENous 2 times per day    sodium chloride flush 0.9 % injection 5-40 mL  5-40 mL IntraVENous PRN    0.9 % sodium chloride infusion   IntraVENous PRN    polyethylene glycol (GLYCOLAX) packet 17 g  17 g Oral Daily PRN    acetaminophen (TYLENOL) tablet 650 mg  650 mg Oral Q6H PRN    Or    acetaminophen (TYLENOL) suppository 650 mg  650 mg Rectal Q6H PRN    escitalopram (LEXAPRO) tablet 10 mg  10 mg Oral Daily    ferrous sulfate (IRON 325) tablet 325 mg  325 mg Oral Daily with breakfast    folic acid (FOLVITE) tablet 1 mg  1 mg Oral Daily    melatonin tablet 3 mg  3 mg Oral QHS    metoprolol succinate (TOPROL XL) extended release tablet 12.5 mg  12.5 mg Oral Daily    [Held by provider] pregabalin (LYRICA) capsule 75 mg  75 mg Oral BID    tamsulosin (FLOMAX) capsule 0.4 mg  0.4 mg Oral Daily    [Held by provider] doxepin (SINEQUAN) capsule 150 mg  150 mg Oral Nightly    predniSONE (DELTASONE) tablet 5 mg  5 mg Oral Daily    torsemide (DEMADEX) tablet 100 mg  100 mg Oral Daily    allopurinol (ZYLOPRIM) tablet 100 mg  100 mg Oral Daily    0.9 % sodium chloride infusion   IntraVENous Continuous    sodium chloride flush 0.9 % injection 5-40 mL  5-40 mL IntraVENous 2 times per day    sodium chloride flush 0.9 %  7.8*   HCT 29.4* 27.9* 25.5*    325  --         Recent Labs     03/26/24  0416 03/27/24  0651 03/28/24  0547    138 137   K 3.4* 3.2* 3.3*    101* 100*   CO2 32 32 31   BUN 9 13 14   CREATININE 0.90 1.30* 1.40*   CALCIUM 9.0 9.9 9.3   MG  --  1.6* 1.6*         Assessment:     Principal Problem:    Sepsis ([facility])  Active Problems:    Closed disp comminuted fracture of shaft of left femur with malunion    Rheumatoid arthritis (HCC)    Hypertension    Depression    CKD (chronic kidney disease) stage 3, GFR 30-59 ml/min ([facility])    Iron deficiency anemia    Chronic diastolic heart failure ([facility])    Cutaneous lupus erythematosus    Morbid obesity ([facility])    Hypomagnesemia    Hypokalemia    Pneumonia    Abscess of left thigh    Abscess    Hypoxemia    Septicemia ([facility])    Immunocompromised state ([facility])  Resolved Problems:    * No resolved hospital problems. *      Plan:     1) TODD - baseline Cr likely 0.5-0.6  - Potential renal insult - CT with contrast on 3/23 (less likely), combination of Vancomycin/ Zosyn along with elevated Vancomycin level (more likely) with subsequent ATN.  No longer on these Abx  - Monitor daily labs, strict I/O's, avoid any other nephrotoxins  - Will tentatively hold torsemide and give one liter of saline today  - Cr up to 0.9 on 3/26, today is 1.4    2) Pneumonia    3) Left thigh abscess    4) Distal femur fracture    5) HTN    6) Hypokalemia - Mg and K repletion today    High Medical Decision Making  -Patient with at least one acute illness that poses a threat to bodily function  -Reviewed 3 labs  -Reviewed external charts

## 2024-03-29 LAB
BACTERIA SPEC CULT: NORMAL
SERVICE CMNT-IMP: NORMAL

## 2024-04-01 ENCOUNTER — TELEPHONE (OUTPATIENT)
Dept: ORTHOPEDIC SURGERY | Age: 74
End: 2024-04-01

## 2024-04-01 NOTE — TELEPHONE ENCOUNTER
Call Hilton Suarez with [facility] [phone] has questions about if should push f/u apt out?

## 2024-04-03 NOTE — PROGRESS NOTES
Physician Progress Note      PATIENT:               PA GALLAGHER  CSN #:                  [id number]  :                       1950  ADMIT DATE:       3/23/2024 10:10 AM  DISCH DATE:        3/28/2024 4:04 PM  RESPONDING  PROVIDER #:        Balaji Gutierrez MD          QUERY TEXT:    Pt admitted with sepsis and underwent incision and drainage of abscess of   previous distal femur fracture .? If possible, please document in progress   notes and discharge summary the relationship if any between the infection and   the surgery:    The medical record reflects the following:  Risk Factors: 73 YOF with left thigh abscess  Clinical Indicators: Per Op Note: periprosthetic infection left distal femur   status post retrograde rashad fixation of distal femur fracture.  Treatment: Incision and drainage, Stimulan antibiotic beads, wound VAC system  Options provided:  -- Abscess is due to the procedure  -- *** is not due to the procedure, but is due to *** (known patient risk   factor)  -- Abscess is not due to the procedure, but is due to other incidental risk   factor, Please specify other incidental risk factor.  -- Other - I will add my own diagnosis  -- Disagree - Not applicable / Not valid  -- Disagree - Clinically unable to determine / Unknown  -- Refer to Clinical Documentation Reviewer    PROVIDER RESPONSE TEXT:    Provider is clinically unable to determine a response to this query.    Query created by: Yessica Dyer on 2024 8:15 AM      Electronically signed by:  Balaji Gutierrez MD 4/3/2024 9:59 AM

## 2024-04-08 LAB
FUNGUS SMEAR: NORMAL
FUNGUS SMEAR: NORMAL
SPECIMEN SOURCE: NORMAL
SPECIMEN SOURCE: NORMAL

## 2024-04-26 LAB
FUNGAL CULT/SMEAR: NORMAL
FUNGAL CULT/SMEAR: NORMAL
FUNGUS (MYCOLOGY) CULTURE: NEGATIVE
FUNGUS (MYCOLOGY) CULTURE: NEGATIVE
FUNGUS SMEAR: NORMAL
FUNGUS SMEAR: NORMAL
REFLEX TO ID: NORMAL
REFLEX TO ID: NORMAL
SPECIMEN PROCESSING: NORMAL
SPECIMEN PROCESSING: NORMAL
SPECIMEN SOURCE: NORMAL

## 2024-05-02 ENCOUNTER — TELEPHONE (OUTPATIENT)
Dept: ORTHOPEDIC SURGERY | Age: 74
End: 2024-05-02

## 2024-05-02 ENCOUNTER — OFFICE VISIT (OUTPATIENT)
Dept: ORTHOPEDIC SURGERY | Age: 74
End: 2024-05-02
Payer: MEDICARE

## 2024-05-02 DIAGNOSIS — S72.352P CLOSED DISP COMMINUTED FRACTURE OF SHAFT OF LEFT FEMUR WITH MALUNION: Primary | ICD-10-CM

## 2024-05-02 DIAGNOSIS — M17.32 POST-TRAUMATIC OSTEOARTHRITIS OF LEFT KNEE: ICD-10-CM

## 2024-05-02 PROCEDURE — G8400 PT W/DXA NO RESULTS DOC: HCPCS | Performed by: ORTHOPAEDIC SURGERY

## 2024-05-02 PROCEDURE — 1123F ACP DISCUSS/DSCN MKR DOCD: CPT | Performed by: ORTHOPAEDIC SURGERY

## 2024-05-02 PROCEDURE — 1090F PRES/ABSN URINE INCON ASSESS: CPT | Performed by: ORTHOPAEDIC SURGERY

## 2024-05-02 PROCEDURE — 3017F COLORECTAL CA SCREEN DOC REV: CPT | Performed by: ORTHOPAEDIC SURGERY

## 2024-05-02 PROCEDURE — G8428 CUR MEDS NOT DOCUMENT: HCPCS | Performed by: ORTHOPAEDIC SURGERY

## 2024-05-02 PROCEDURE — 1036F TOBACCO NON-USER: CPT | Performed by: ORTHOPAEDIC SURGERY

## 2024-05-02 PROCEDURE — G8417 CALC BMI ABV UP PARAM F/U: HCPCS | Performed by: ORTHOPAEDIC SURGERY

## 2024-05-02 PROCEDURE — 99212 OFFICE O/P EST SF 10 MIN: CPT | Performed by: ORTHOPAEDIC SURGERY

## 2024-05-02 NOTE — TELEPHONE ENCOUNTER
She left a voicemail stating patient was seen today and wants to know if there are any wound care changes or instructions. She is asking for a return call.

## 2024-05-02 NOTE — PROGRESS NOTES
Progress Note    Patient: Ama Chu MRN: [id number]  SSN: [id number]    YOB: 1950  Age: 73 y.o.  Sex: female        5/2/2024      Subjective:     Patient is here today in follow-up.  She is now about 3-1/2 months out from a retrograde nail for a left supracondylar femur fracture.  She also had drainage of the wound by Dr. Gutierrez where he washed out her lateral wound and she has some antibiotics some antibiotic beads for a while.  She still has a wound VAC over this area that is granulating pretty well now.  She is pretty miserable she does have a lot of pain in her left knee and obviously besides the fracture that she has she does has just incredibly arthritic knee she has just very advanced arthritic changes throughout all 3 compartments of her left knee she has bone-on-bone arthritic changes in her patellofemoral joint as well as her medial lateral compartments of her knee joint itself.    Objective:     There were no vitals filed for this visit.       Physical Exam:     Skin -her other incisions have healed her lateral incision is granulating very well.  She does have a wound VAC over this so I have replaced her wound VAC today.  Motor and sensory function intact in LEFT LOWER extremity  Pulses palpable in LEFT LOWER extremity     XRAY FINDINGS:  Hvxdvfbuler-sdowof-vk left distal femur fracture, findings-AP and lateral views of the left knee shows a left distal femur fracture appears to be very reasonably aligned on both AP and lateral projection.  There does appear to be significant evidence of healing at the primary fracture lines of both AP and lateral projection.  The nail itself is in excellent position with no evidence of loosening or failure.  She does have these just incredibly impressive degenerative changes throughout her left knee which is longstanding and similar to her previous x-rays.  Impression-healing well aligned left distal femur fracture    Assessment:     #1-healing left

## 2024-05-09 RX ORDER — LIDOCAINE 4 G/G
1 PATCH TOPICAL DAILY
COMMUNITY
Start: 2024-04-17

## 2024-05-09 RX ORDER — ALBUTEROL SULFATE 90 UG/1
AEROSOL, METERED RESPIRATORY (INHALATION)
COMMUNITY
Start: 2023-08-26

## 2024-05-09 RX ORDER — LEVOFLOXACIN 250 MG/1
TABLET, FILM COATED ORAL
COMMUNITY
Start: 2024-04-30

## 2024-05-09 RX ORDER — RISEDRONATE SODIUM 150 MG/1
TABLET, FILM COATED ORAL
COMMUNITY

## 2024-05-09 RX ORDER — METOLAZONE 5 MG/1
5 TABLET ORAL DAILY PRN
COMMUNITY

## 2024-05-09 RX ORDER — AMLODIPINE BESYLATE 2.5 MG/1
2.5 TABLET ORAL DAILY
COMMUNITY

## 2024-05-09 RX ORDER — NALOXONE HYDROCHLORIDE 4 MG/.1ML
4 SPRAY NASAL
COMMUNITY
Start: 2021-09-15

## 2024-05-09 RX ORDER — ACETAMINOPHEN 500 MG
1000 TABLET ORAL 3 TIMES DAILY
COMMUNITY
Start: 2024-04-16

## 2024-05-09 RX ORDER — ONDANSETRON 4 MG/1
4 TABLET, ORALLY DISINTEGRATING ORAL EVERY 4 HOURS PRN
COMMUNITY
Start: 2024-04-16

## 2024-05-09 RX ORDER — ALLOPURINOL 100 MG/1
TABLET ORAL
COMMUNITY
Start: 2024-03-19

## 2024-05-09 RX ORDER — TORSEMIDE 100 MG/1
50 TABLET ORAL DAILY
COMMUNITY

## 2024-05-09 RX ORDER — IPRATROPIUM BROMIDE AND ALBUTEROL SULFATE 2.5; .5 MG/3ML; MG/3ML
3 SOLUTION RESPIRATORY (INHALATION) 3 TIMES DAILY
COMMUNITY
Start: 2024-03-19

## 2024-05-09 RX ORDER — HYDROCODONE BITARTRATE AND ACETAMINOPHEN 5; 325 MG/1; MG/1
TABLET ORAL
COMMUNITY
Start: 2024-03-19

## 2024-05-09 RX ORDER — BISACODYL 10 MG
10 SUPPOSITORY, RECTAL RECTAL DAILY PRN
COMMUNITY
Start: 2024-04-16

## 2024-05-09 RX ORDER — METHOTREXATE 25 MG/ML
INJECTION, SOLUTION INTRA-ARTERIAL; INTRAMUSCULAR; INTRAVENOUS
COMMUNITY
Start: 2024-05-02

## 2024-05-09 RX ORDER — CELECOXIB 200 MG/1
CAPSULE ORAL DAILY
COMMUNITY

## 2024-05-09 RX ORDER — NICOTINE POLACRILEX 4 MG
15 LOZENGE BUCCAL PRN
COMMUNITY
Start: 2024-04-16

## 2024-05-09 RX ORDER — PROCHLORPERAZINE 25 MG/1
1 SUPPOSITORY RECTAL 4 TIMES DAILY PRN
COMMUNITY
Start: 2024-03-19

## 2024-05-09 RX ORDER — POTASSIUM CHLORIDE 750 MG/1
TABLET, FILM COATED, EXTENDED RELEASE ORAL
COMMUNITY
Start: 2024-03-19

## 2024-05-09 RX ORDER — TRIAMCINOLONE ACETONIDE 1 MG/G
1 CREAM TOPICAL DAILY PRN
COMMUNITY
Start: 2020-05-14

## 2024-05-09 RX ORDER — PSEUDOEPHEDRINE HCL 30 MG
200 TABLET ORAL DAILY
COMMUNITY
Start: 2024-04-17

## 2024-05-09 RX ORDER — MEDROXYPROGESTERONE ACETATE 150 MG/ML
INJECTION, SUSPENSION INTRAMUSCULAR
COMMUNITY
Start: 2024-03-05

## 2024-05-10 LAB
ACID FAST STN SPEC: NEGATIVE
MYCOBACTERIUM SPEC QL CULT: NEGATIVE
SPECIMEN PREPARATION: NORMAL
SPECIMEN SOURCE: NORMAL

## 2024-05-13 ENCOUNTER — OFFICE VISIT (OUTPATIENT)
Age: 74
End: 2024-05-13
Payer: MEDICARE

## 2024-05-13 DIAGNOSIS — G89.29 CHRONIC PAIN OF LEFT KNEE: Primary | ICD-10-CM

## 2024-05-13 DIAGNOSIS — M25.562 CHRONIC PAIN OF LEFT KNEE: Primary | ICD-10-CM

## 2024-05-13 PROCEDURE — 1123F ACP DISCUSS/DSCN MKR DOCD: CPT | Performed by: ANESTHESIOLOGY

## 2024-05-13 PROCEDURE — 99204 OFFICE O/P NEW MOD 45 MIN: CPT | Performed by: ANESTHESIOLOGY

## 2024-05-13 RX ORDER — TRAMADOL HYDROCHLORIDE 50 MG/1
50 TABLET ORAL EVERY 6 HOURS PRN
COMMUNITY
Start: 2024-05-08

## 2024-05-13 NOTE — PROGRESS NOTES
Previous interventions:  Procedure Date Results   LEFT LEG INCISION AND DRAINAGE OF LEFT THIGH ABSCESS, WITH PLACEMENT OF WOUND VAC  3.23.2024    LEFT FEMUR IM NAIL RUFUS INSERTION RETROGRADE      1.24.2024                               Previous medication trials: Listed:  Class Medication Results   NSAIDs ***    Oral steroids Prednisone 5mg  Medrol dose pack    Tylenol 500mg    Antiepileptics     Antidepressants Sinequan 100mg  Lexapro 10mg  Trazodone 150mg    Relaxants Flexeril 10mg    Topicals/transdermaI patches     Narcotics Norco 5mg bid      Previous tests/studies:  Study Date Results   XR FEMUR LEFT  3.23.2024 IMPRESSION:  Distal femoral fractures traversed by intramedullary rufus. Sequelae  of I&D.   CT FEMUR LEFT W CONTRAST  3.23.2024 IMPRESSION:  1. Per history patient open reduction internal fixation on 1/24/2024. There  distal metaphyseal femoral fracture on the left however appears to be of acute  nature with comminution. Clinical correlation is recommended. Intramedullary rufus  appears to be in place. However circumferentially this is surrounded by a large  enhancing fluid collection as described highly suspicious for an abscess until  proven otherwise.  2. Moderate patellofemoral and medial tibiofemoral degenerative arthropathy.                         Previous therapies:  Type of Therapy Date Results   ***                                       
  Antiepileptics       Antidepressants Sinequan 100mg  Lexapro 10mg  Trazodone 150mg  mild benefit   Relaxants Flexeril 10mg  no benefit   Topicals/transdermaI patches       Narcotics Norco 5mg bid, tramadol 50mg  mild benefit          Previous tests/studies:  Study Date Results   XR FEMUR LEFT  3.23.2024 IMPRESSION:  Distal femoral fractures traversed by intramedullary rashad. Sequelae  of I&D.   CT FEMUR LEFT W CONTRAST  3.23.2024 IMPRESSION:  1. Per history patient open reduction internal fixation on 1/24/2024. There  distal metaphyseal femoral fracture on the left however appears to be of acute  nature with comminution. Clinical correlation is recommended. Intramedullary rashad  appears to be in place. However circumferentially this is surrounded by a large  enhancing fluid collection as described highly suspicious for an abscess until  proven otherwise.  2. Moderate patellofemoral and medial tibiofemoral degenerative arthropathy.                                              Previous therapies:  Type of Therapy Date Results   Rehab therapy 1/24-5/24 Helped pain   Home health PT The past week, 2 sessions/weekly  helped pain                                             Opioid Monitoring:  Last UDS (results): N/A (N/A) Opioid agreement signed: N/A  [facility] database: evaluated today, appropriate Compliance issues: N/A  Last opioid dose change: N/A    Opioid Risk Tool Score (low/mod/high}:    Opioid Risk Tool Female I Male   Family history of substance abuse     Alcohol 1 3   Illegal drugs 2 3   Prescription drugs 4 4   Personal history of substance abuse     Alcohol 3 3   Illegal drugs 4 4   Prescription drugs 5 5   Age between 16-45 years 1 1   History of preadolescent sexual abuse 3 0   Psychological disease     ADD, OCD, bipolar, schizophrenia 2 2   Depression 1 1   Total: 3 or less = low, 8 or higher= high    Controlled Substance Agreement signed on the following date(s}: Workmans Comp:  Lawsuit:    No past medical

## 2024-06-09 ENCOUNTER — APPOINTMENT (OUTPATIENT)
Dept: CT IMAGING | Age: 74
DRG: 603 | End: 2024-06-09
Payer: MEDICARE

## 2024-06-09 ENCOUNTER — HOSPITAL ENCOUNTER (INPATIENT)
Age: 74
LOS: 1 days | Discharge: HOME | DRG: 603 | End: 2024-06-09
Attending: EMERGENCY MEDICINE | Admitting: HOSPITALIST
Payer: MEDICARE

## 2024-06-09 ENCOUNTER — HOSPITAL ENCOUNTER (INPATIENT)
Age: 74
LOS: 10 days | Discharge: NURSING, REHAB OR LONG-TERM CARE | DRG: 603 | End: 2024-06-19
Attending: INTERNAL MEDICINE | Admitting: HOSPITALIST
Payer: MEDICARE

## 2024-06-09 VITALS
WEIGHT: 293 LBS | BODY MASS INDEX: 44.41 KG/M2 | HEART RATE: 87 BPM | RESPIRATION RATE: 14 BRPM | DIASTOLIC BLOOD PRESSURE: 79 MMHG | SYSTOLIC BLOOD PRESSURE: 149 MMHG | OXYGEN SATURATION: 100 % | HEIGHT: 68 IN | TEMPERATURE: 97.3 F

## 2024-06-09 DIAGNOSIS — L02.415 ABSCESS OF RIGHT THIGH: Primary | ICD-10-CM

## 2024-06-09 DIAGNOSIS — L03.115 CELLULITIS AND ABSCESS OF RIGHT LEG: Primary | ICD-10-CM

## 2024-06-09 DIAGNOSIS — L02.415 CELLULITIS AND ABSCESS OF RIGHT LEG: Primary | ICD-10-CM

## 2024-06-09 PROBLEM — K59.01 SLOW TRANSIT CONSTIPATION: Status: ACTIVE | Noted: 2021-11-05

## 2024-06-09 PROBLEM — E55.9 VITAMIN D INSUFFICIENCY: Status: ACTIVE | Noted: 2018-01-30

## 2024-06-09 PROBLEM — M54.50 LOW BACK PAIN: Chronic | Status: ACTIVE | Noted: 2021-07-14

## 2024-06-09 PROBLEM — F33.9 RECURRENT DEPRESSION (HCC): Status: ACTIVE | Noted: 2018-03-18

## 2024-06-09 PROBLEM — G89.29 CHRONIC PAIN OF BOTH KNEES: Status: ACTIVE | Noted: 2021-08-24

## 2024-06-09 PROBLEM — M06.89 OTHER SPECIFIED RHEUMATOID ARTHRITIS, MULTIPLE SITES (HCC): Status: ACTIVE | Noted: 2023-08-14

## 2024-06-09 PROBLEM — M48.00 CENTRAL STENOSIS OF SPINAL CANAL: Chronic | Status: ACTIVE | Noted: 2021-07-21

## 2024-06-09 PROBLEM — D63.8 ANEMIA OF CHRONIC DISEASE: Status: ACTIVE | Noted: 2018-11-21

## 2024-06-09 PROBLEM — M25.551 RIGHT HIP PAIN: Status: ACTIVE | Noted: 2021-05-25

## 2024-06-09 PROBLEM — S21.209A: Status: ACTIVE | Noted: 2021-10-08

## 2024-06-09 PROBLEM — M25.562 ACUTE PAIN OF LEFT KNEE: Status: ACTIVE | Noted: 2021-08-24

## 2024-06-09 PROBLEM — R78.81 BACTEREMIA: Status: ACTIVE | Noted: 2021-07-21

## 2024-06-09 PROBLEM — G47.33 OBSTRUCTIVE SLEEP APNEA SYNDROME: Status: ACTIVE | Noted: 2023-06-28

## 2024-06-09 PROBLEM — E53.8 VITAMIN B12 DEFICIENCY: Chronic | Status: ACTIVE | Noted: 2021-07-15

## 2024-06-09 PROBLEM — M25.561 CHRONIC PAIN OF BOTH KNEES: Status: ACTIVE | Noted: 2021-08-24

## 2024-06-09 PROBLEM — Z91.89 AT RISK FOR MALNUTRITION: Status: ACTIVE | Noted: 2024-02-27

## 2024-06-09 PROBLEM — T07.XXXA MULTIPLE WOUNDS: Status: ACTIVE | Noted: 2021-07-29

## 2024-06-09 PROBLEM — Z79.1 LONG TERM (CURRENT) USE OF NON-STEROIDAL ANTI-INFLAMMATORIES (NSAID): Status: ACTIVE | Noted: 2020-10-18

## 2024-06-09 PROBLEM — N18.4 STAGE 4 CHRONIC KIDNEY DISEASE (HCC): Status: ACTIVE | Noted: 2024-03-27

## 2024-06-09 PROBLEM — M79.89 RIGHT LEG SWELLING: Status: ACTIVE | Noted: 2019-01-18

## 2024-06-09 PROBLEM — M15.0 PRIMARY GENERALIZED (OSTEO)ARTHRITIS: Status: ACTIVE | Noted: 2020-10-20

## 2024-06-09 PROBLEM — M72.2 PLANTAR FASCIAL FIBROMATOSIS: Status: ACTIVE | Noted: 2020-10-01

## 2024-06-09 PROBLEM — I11.0 HYPERTENSIVE HEART DISEASE WITH HEART FAILURE (HCC): Status: ACTIVE | Noted: 2020-10-01

## 2024-06-09 PROBLEM — G89.4 CHRONIC PAIN DISORDER: Chronic | Status: ACTIVE | Noted: 2017-06-21

## 2024-06-09 PROBLEM — M80.00XA AGE-RELATED OSTEOPOROSIS WITH CURRENT PATHOLOGICAL FRACTURE: Status: ACTIVE | Noted: 2018-01-30

## 2024-06-09 PROBLEM — M19.90 UNSPECIFIED OSTEOARTHRITIS, UNSPECIFIED SITE: Status: ACTIVE | Noted: 2020-10-01

## 2024-06-09 PROBLEM — M81.0 AGE-RELATED OSTEOPOROSIS WITHOUT CURRENT PATHOLOGICAL FRACTURE: Status: ACTIVE | Noted: 2018-01-30

## 2024-06-09 PROBLEM — Z79.83 LONG-TERM CURRENT USE OF BISPHOSPHONATE: Status: ACTIVE | Noted: 2018-01-30

## 2024-06-09 PROBLEM — R53.81 DEBILITY: Status: ACTIVE | Noted: 2021-07-29

## 2024-06-09 PROBLEM — R79.89 LOW SERUM CORTISOL LEVEL: Status: ACTIVE | Noted: 2024-05-08

## 2024-06-09 PROBLEM — I50.9 HEART FAILURE, UNSPECIFIED (HCC): Status: ACTIVE | Noted: 2020-10-20

## 2024-06-09 PROBLEM — E87.70 FLUID OVERLOAD: Status: ACTIVE | Noted: 2023-06-28

## 2024-06-09 PROBLEM — G06.2 EPIDURAL ABSCESS: Chronic | Status: ACTIVE | Noted: 2021-07-21

## 2024-06-09 PROBLEM — D64.9 ANEMIA: Status: ACTIVE | Noted: 2018-11-21

## 2024-06-09 PROBLEM — R94.39 ABNORMAL CARDIOVASCULAR STRESS TEST: Status: ACTIVE | Noted: 2018-04-27

## 2024-06-09 PROBLEM — F13.99 SEDATIVE, HYPNOTIC OR ANXIOLYTIC-RELATED DISORDER (HCC): Status: ACTIVE | Noted: 2024-02-15

## 2024-06-09 PROBLEM — F32.9 MAJOR DEPRESSIVE DISORDER, SINGLE EPISODE, UNSPECIFIED: Status: ACTIVE | Noted: 2020-10-01

## 2024-06-09 PROBLEM — M25.612 STIFFNESS OF LEFT SHOULDER JOINT: Status: ACTIVE | Noted: 2019-06-03

## 2024-06-09 PROBLEM — N17.9 AKI (ACUTE KIDNEY INJURY) (HCC): Status: ACTIVE | Noted: 2021-09-28

## 2024-06-09 PROBLEM — M25.462 EFFUSION OF LEFT KNEE: Status: ACTIVE | Noted: 2017-01-17

## 2024-06-09 PROBLEM — M46.26 OSTEOMYELITIS OF VERTEBRA OF LUMBAR REGION (HCC): Status: ACTIVE | Noted: 2021-07-22

## 2024-06-09 PROBLEM — M17.0 PRIMARY OSTEOARTHRITIS OF BOTH KNEES: Status: ACTIVE | Noted: 2017-02-17

## 2024-06-09 PROBLEM — M25.532 PAIN IN LEFT WRIST: Status: ACTIVE | Noted: 2023-07-24

## 2024-06-09 PROBLEM — M05.79 RHEUMATOID ARTHRITIS INVOLVING MULTIPLE SITES WITH POSITIVE RHEUMATOID FACTOR (HCC): Status: ACTIVE | Noted: 2023-08-14

## 2024-06-09 PROBLEM — M15.9 GENERALIZED OSTEOARTHROSIS, INVOLVING MULTIPLE SITES: Status: ACTIVE | Noted: 2020-10-01

## 2024-06-09 PROBLEM — M25.562 CHRONIC PAIN OF BOTH KNEES: Status: ACTIVE | Noted: 2021-08-24

## 2024-06-09 PROBLEM — M79.675 GREAT TOE PAIN, LEFT: Status: ACTIVE | Noted: 2024-02-05

## 2024-06-09 PROBLEM — Z79.52 LONG TERM SYSTEMIC STEROID USER: Status: ACTIVE | Noted: 2024-02-05

## 2024-06-09 PROBLEM — I20.9 ANGINA PECTORIS, UNSPECIFIED (HCC): Status: ACTIVE | Noted: 2018-04-27

## 2024-06-09 PROBLEM — R33.9 RETENTION OF URINE: Status: ACTIVE | Noted: 2021-09-28

## 2024-06-09 PROBLEM — E27.40 ADRENAL INSUFFICIENCY (HCC): Status: ACTIVE | Noted: 2024-05-08

## 2024-06-09 PROBLEM — I10 ESSENTIAL HYPERTENSION: Chronic | Status: ACTIVE | Noted: 2017-07-19

## 2024-06-09 PROBLEM — R60.0 LOCALIZED EDEMA: Status: ACTIVE | Noted: 2021-09-28

## 2024-06-09 PROBLEM — R29.898 WEAKNESS OF LEFT UPPER EXTREMITY: Status: ACTIVE | Noted: 2019-06-03

## 2024-06-09 PROBLEM — R26.2 INABILITY TO WALK: Chronic | Status: ACTIVE | Noted: 2021-07-14

## 2024-06-09 PROBLEM — F32.A DEPRESSIVE DISORDER: Chronic | Status: ACTIVE | Noted: 2018-03-18

## 2024-06-09 PROBLEM — E61.1 IRON DEFICIENCY: Chronic | Status: ACTIVE | Noted: 2021-07-15

## 2024-06-09 PROBLEM — L97.511 ULCER OF RIGHT FOOT, LIMITED TO BREAKDOWN OF SKIN (HCC): Status: ACTIVE | Noted: 2022-03-08

## 2024-06-09 PROBLEM — J18.9 PNEUMONIA OF LEFT LUNG DUE TO INFECTIOUS ORGANISM: Status: ACTIVE | Noted: 2024-02-22

## 2024-06-09 PROBLEM — J30.2 SEASONAL ALLERGIC RHINITIS: Status: ACTIVE | Noted: 2021-11-26

## 2024-06-09 PROBLEM — F11.20 OPIOID DEPENDENCE (HCC): Status: ACTIVE | Noted: 2024-02-16
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
## 2024-06-09 PROBLEM — M19.90 ARTHRITIS: Chronic | Status: ACTIVE | Noted: 2023-08-14

## 2024-06-09 LAB
ALBUMIN SERPL-MCNC: 2.4 G/DL (ref 3.2–4.6)
ALBUMIN/GLOB SERPL: 0.5 (ref 1–1.9)
ALP SERPL-CCNC: 83 U/L (ref 35–104)
ALT SERPL-CCNC: 11 U/L (ref 12–65)
ANION GAP SERPL CALC-SCNC: 9 MMOL/L (ref 9–18)
AST SERPL-CCNC: 19 U/L (ref 15–37)
BASOPHILS # BLD: 0 K/UL (ref 0–0.2)
BASOPHILS NFR BLD: 0 % (ref 0–2)
BILIRUB SERPL-MCNC: 0.3 MG/DL (ref 0–1.2)
BUN SERPL-MCNC: 7 MG/DL (ref 8–23)
CALCIUM SERPL-MCNC: 8.9 MG/DL (ref 8.8–10.2)
CHLORIDE SERPL-SCNC: 103 MMOL/L (ref 98–107)
CO2 SERPL-SCNC: 30 MMOL/L (ref 20–28)
CREAT SERPL-MCNC: 0.55 MG/DL (ref 0.6–1.1)
CRP SERPL HS-MCNC: >200 MG/L (ref 0–3)
DIFFERENTIAL METHOD BLD: ABNORMAL
EOSINOPHIL # BLD: 0.2 K/UL (ref 0–0.8)
EOSINOPHIL NFR BLD: 3 % (ref 0.5–7.8)
ERYTHROCYTE [DISTWIDTH] IN BLOOD BY AUTOMATED COUNT: 16 % (ref 11.9–14.6)
GLOBULIN SER CALC-MCNC: 5 G/DL (ref 2.3–3.5)
GLUCOSE SERPL-MCNC: 104 MG/DL (ref 70–99)
HCT VFR BLD AUTO: 32.4 % (ref 35.8–46.3)
HGB BLD-MCNC: 9.5 G/DL (ref 11.7–15.4)
IMM GRANULOCYTES # BLD AUTO: 0 K/UL (ref 0–0.5)
IMM GRANULOCYTES NFR BLD AUTO: 1 % (ref 0–5)
LACTATE SERPL-SCNC: 0.8 MMOL/L (ref 0.5–2)
LYMPHOCYTES # BLD: 0.6 K/UL (ref 0.5–4.6)
LYMPHOCYTES NFR BLD: 10 % (ref 13–44)
MCH RBC QN AUTO: 27.9 PG (ref 26.1–32.9)
MCHC RBC AUTO-ENTMCNC: 29.3 G/DL (ref 31.4–35)
MCV RBC AUTO: 95 FL (ref 82–102)
MONOCYTES # BLD: 0.2 K/UL (ref 0.1–1.3)
MONOCYTES NFR BLD: 3 % (ref 4–12)
NEUTS SEG # BLD: 4.8 K/UL (ref 1.7–8.2)
NEUTS SEG NFR BLD: 84 % (ref 43–78)
NRBC # BLD: 0 K/UL (ref 0–0.2)
PLATELET # BLD AUTO: 431 K/UL (ref 150–450)
PMV BLD AUTO: 9.9 FL (ref 9.4–12.3)
POTASSIUM SERPL-SCNC: 4 MMOL/L (ref 3.5–5.1)
PROCALCITONIN SERPL-MCNC: 0.06 NG/ML (ref 0–0.1)
PROT SERPL-MCNC: 7.4 G/DL (ref 6.3–8.2)
RBC # BLD AUTO: 3.41 M/UL (ref 4.05–5.2)
SODIUM SERPL-SCNC: 142 MMOL/L (ref 136–145)
WBC # BLD AUTO: 5.7 K/UL (ref 4.3–11.1)

## 2024-06-09 PROCEDURE — 96365 THER/PROPH/DIAG IV INF INIT: CPT

## 2024-06-09 PROCEDURE — 80053 COMPREHEN METABOLIC PANEL: CPT

## 2024-06-09 PROCEDURE — 6360000002 HC RX W HCPCS

## 2024-06-09 PROCEDURE — 87040 BLOOD CULTURE FOR BACTERIA: CPT

## 2024-06-09 PROCEDURE — 86141 C-REACTIVE PROTEIN HS: CPT

## 2024-06-09 PROCEDURE — 2580000003 HC RX 258

## 2024-06-09 PROCEDURE — 83605 ASSAY OF LACTIC ACID: CPT

## 2024-06-09 PROCEDURE — 99285 EMERGENCY DEPT VISIT HI MDM: CPT

## 2024-06-09 PROCEDURE — 96375 TX/PRO/DX INJ NEW DRUG ADDON: CPT

## 2024-06-09 PROCEDURE — 85025 COMPLETE CBC W/AUTO DIFF WBC: CPT

## 2024-06-09 PROCEDURE — 96366 THER/PROPH/DIAG IV INF ADDON: CPT

## 2024-06-09 PROCEDURE — 73700 CT LOWER EXTREMITY W/O DYE: CPT

## 2024-06-09 PROCEDURE — 1100000000 HC RM PRIVATE

## 2024-06-09 PROCEDURE — 96367 TX/PROPH/DG ADDL SEQ IV INF: CPT

## 2024-06-09 PROCEDURE — 84145 PROCALCITONIN (PCT): CPT

## 2024-06-09 PROCEDURE — 6370000000 HC RX 637 (ALT 250 FOR IP): Performed by: HOSPITALIST

## 2024-06-09 RX ORDER — POTASSIUM CHLORIDE 20 MEQ/1
40 TABLET, EXTENDED RELEASE ORAL PRN
Status: DISCONTINUED | OUTPATIENT
Start: 2024-06-09 | End: 2024-06-16

## 2024-06-09 RX ORDER — SODIUM CHLORIDE 0.9 % (FLUSH) 0.9 %
5-40 SYRINGE (ML) INJECTION PRN
Status: DISCONTINUED | OUTPATIENT
Start: 2024-06-09 | End: 2024-06-19 | Stop reason: HOSPADM

## 2024-06-09 RX ORDER — BISACODYL 10 MG
10 SUPPOSITORY, RECTAL RECTAL DAILY PRN
Status: DISCONTINUED | OUTPATIENT
Start: 2024-06-09 | End: 2024-06-19 | Stop reason: HOSPADM

## 2024-06-09 RX ORDER — ACETAMINOPHEN 325 MG/1
650 TABLET ORAL EVERY 6 HOURS PRN
Status: DISCONTINUED | OUTPATIENT
Start: 2024-06-09 | End: 2024-06-19 | Stop reason: HOSPADM

## 2024-06-09 RX ORDER — HYDROXYCHLOROQUINE SULFATE 200 MG/1
200 TABLET, FILM COATED ORAL 2 TIMES DAILY
Status: DISCONTINUED | OUTPATIENT
Start: 2024-06-09 | End: 2024-06-19 | Stop reason: HOSPADM

## 2024-06-09 RX ORDER — HYDROCODONE BITARTRATE AND ACETAMINOPHEN 5; 325 MG/1; MG/1
1 TABLET ORAL EVERY 4 HOURS PRN
Status: DISCONTINUED | OUTPATIENT
Start: 2024-06-09 | End: 2024-06-19 | Stop reason: HOSPADM

## 2024-06-09 RX ORDER — POLYETHYLENE GLYCOL 3350 17 G/17G
17 POWDER, FOR SOLUTION ORAL DAILY PRN
Status: DISCONTINUED | OUTPATIENT
Start: 2024-06-09 | End: 2024-06-19 | Stop reason: HOSPADM

## 2024-06-09 RX ORDER — ACETAMINOPHEN 650 MG/1
650 SUPPOSITORY RECTAL EVERY 6 HOURS PRN
Status: DISCONTINUED | OUTPATIENT
Start: 2024-06-09 | End: 2024-06-19 | Stop reason: HOSPADM

## 2024-06-09 RX ORDER — IPRATROPIUM BROMIDE AND ALBUTEROL SULFATE 2.5; .5 MG/3ML; MG/3ML
1 SOLUTION RESPIRATORY (INHALATION) 3 TIMES DAILY
Status: DISCONTINUED | OUTPATIENT
Start: 2024-06-09 | End: 2024-06-10

## 2024-06-09 RX ORDER — PREGABALIN 75 MG/1
150 CAPSULE ORAL 2 TIMES DAILY
Status: DISCONTINUED | OUTPATIENT
Start: 2024-06-09 | End: 2024-06-19 | Stop reason: HOSPADM

## 2024-06-09 RX ORDER — SODIUM CHLORIDE 0.9 % (FLUSH) 0.9 %
5-40 SYRINGE (ML) INJECTION EVERY 12 HOURS SCHEDULED
Status: DISCONTINUED | OUTPATIENT
Start: 2024-06-09 | End: 2024-06-19 | Stop reason: HOSPADM

## 2024-06-09 RX ORDER — MAGNESIUM SULFATE IN WATER 40 MG/ML
2000 INJECTION, SOLUTION INTRAVENOUS PRN
Status: DISCONTINUED | OUTPATIENT
Start: 2024-06-09 | End: 2024-06-16

## 2024-06-09 RX ORDER — LORAZEPAM 1 MG/1
2 TABLET ORAL NIGHTLY
Status: DISCONTINUED | OUTPATIENT
Start: 2024-06-09 | End: 2024-06-19 | Stop reason: HOSPADM

## 2024-06-09 RX ORDER — TAMSULOSIN HYDROCHLORIDE 0.4 MG/1
0.4 CAPSULE ORAL DAILY
Status: DISCONTINUED | OUTPATIENT
Start: 2024-06-10 | End: 2024-06-19 | Stop reason: HOSPADM

## 2024-06-09 RX ORDER — PREDNISONE 5 MG/1
5 TABLET ORAL DAILY
Status: DISCONTINUED | OUTPATIENT
Start: 2024-06-10 | End: 2024-06-10

## 2024-06-09 RX ORDER — ESCITALOPRAM OXALATE 10 MG/1
10 TABLET ORAL DAILY
Status: DISCONTINUED | OUTPATIENT
Start: 2024-06-10 | End: 2024-06-19 | Stop reason: HOSPADM

## 2024-06-09 RX ORDER — METOPROLOL SUCCINATE 50 MG/1
50 TABLET, EXTENDED RELEASE ORAL DAILY
Status: DISCONTINUED | OUTPATIENT
Start: 2024-06-10 | End: 2024-06-11

## 2024-06-09 RX ORDER — ONDANSETRON 2 MG/ML
4 INJECTION INTRAMUSCULAR; INTRAVENOUS EVERY 6 HOURS PRN
Status: DISCONTINUED | OUTPATIENT
Start: 2024-06-09 | End: 2024-06-19 | Stop reason: HOSPADM

## 2024-06-09 RX ORDER — ONDANSETRON 4 MG/1
4 TABLET, ORALLY DISINTEGRATING ORAL EVERY 8 HOURS PRN
Status: DISCONTINUED | OUTPATIENT
Start: 2024-06-09 | End: 2024-06-19 | Stop reason: HOSPADM

## 2024-06-09 RX ORDER — ALLOPURINOL 100 MG/1
100 TABLET ORAL DAILY
Status: DISCONTINUED | OUTPATIENT
Start: 2024-06-10 | End: 2024-06-19 | Stop reason: HOSPADM

## 2024-06-09 RX ORDER — TRAMADOL HYDROCHLORIDE 50 MG/1
50 TABLET ORAL EVERY 6 HOURS PRN
Status: DISCONTINUED | OUTPATIENT
Start: 2024-06-09 | End: 2024-06-19 | Stop reason: HOSPADM

## 2024-06-09 RX ORDER — ONDANSETRON 2 MG/ML
4 INJECTION INTRAMUSCULAR; INTRAVENOUS
Status: COMPLETED | OUTPATIENT
Start: 2024-06-09 | End: 2024-06-09

## 2024-06-09 RX ORDER — POTASSIUM CHLORIDE 7.45 MG/ML
10 INJECTION INTRAVENOUS PRN
Status: DISCONTINUED | OUTPATIENT
Start: 2024-06-09 | End: 2024-06-16

## 2024-06-09 RX ORDER — SODIUM CHLORIDE 9 MG/ML
INJECTION, SOLUTION INTRAVENOUS PRN
Status: DISCONTINUED | OUTPATIENT
Start: 2024-06-09 | End: 2024-06-19 | Stop reason: HOSPADM

## 2024-06-09 RX ORDER — PANTOPRAZOLE SODIUM 40 MG/1
40 TABLET, DELAYED RELEASE ORAL DAILY
Status: DISCONTINUED | OUTPATIENT
Start: 2024-06-10 | End: 2024-06-19 | Stop reason: HOSPADM

## 2024-06-09 RX ORDER — TORSEMIDE 100 MG/1
50 TABLET ORAL DAILY
Status: DISCONTINUED | OUTPATIENT
Start: 2024-06-10 | End: 2024-06-19 | Stop reason: HOSPADM

## 2024-06-09 RX ORDER — DOXEPIN HYDROCHLORIDE 50 MG/1
150 CAPSULE ORAL NIGHTLY
Status: DISCONTINUED | OUTPATIENT
Start: 2024-06-09 | End: 2024-06-19 | Stop reason: HOSPADM

## 2024-06-09 RX ORDER — AMLODIPINE BESYLATE 5 MG/1
2.5 TABLET ORAL DAILY
Status: DISCONTINUED | OUTPATIENT
Start: 2024-06-10 | End: 2024-06-11

## 2024-06-09 RX ORDER — TRAZODONE HYDROCHLORIDE 50 MG/1
300 TABLET ORAL NIGHTLY
Status: DISCONTINUED | OUTPATIENT
Start: 2024-06-09 | End: 2024-06-19 | Stop reason: HOSPADM

## 2024-06-09 RX ORDER — SENNA AND DOCUSATE SODIUM 50; 8.6 MG/1; MG/1
2 TABLET, FILM COATED ORAL DAILY
Status: DISCONTINUED | OUTPATIENT
Start: 2024-06-10 | End: 2024-06-19 | Stop reason: HOSPADM

## 2024-06-09 RX ADMIN — ONDANSETRON 4 MG: 2 INJECTION INTRAMUSCULAR; INTRAVENOUS at 19:25

## 2024-06-09 RX ADMIN — PREGABALIN 150 MG: 75 CAPSULE ORAL at 23:26

## 2024-06-09 RX ADMIN — Medication 2500 MG: at 19:17

## 2024-06-09 RX ADMIN — HYDROCODONE BITARTRATE AND ACETAMINOPHEN 1 TABLET: 5; 325 TABLET ORAL at 23:26

## 2024-06-09 RX ADMIN — TRAZODONE HYDROCHLORIDE 300 MG: 50 TABLET ORAL at 23:26

## 2024-06-09 RX ADMIN — LORAZEPAM 2 MG: 1 TABLET ORAL at 23:26

## 2024-06-09 RX ADMIN — DOXEPIN HYDROCHLORIDE 150 MG: 50 CAPSULE ORAL at 23:25

## 2024-06-09 RX ADMIN — CEFEPIME 2000 MG: 2 INJECTION, POWDER, FOR SOLUTION INTRAVENOUS at 18:31

## 2024-06-09 RX ADMIN — FENTANYL CITRATE 50 MCG: 50 INJECTION INTRAMUSCULAR; INTRAVENOUS at 19:25

## 2024-06-09 RX ADMIN — HYDROXYCHLOROQUINE SULFATE 200 MG: 200 TABLET ORAL at 23:26

## 2024-06-09 ASSESSMENT — PAIN DESCRIPTION - ORIENTATION
ORIENTATION: RIGHT

## 2024-06-09 ASSESSMENT — PAIN DESCRIPTION - LOCATION
LOCATION: LEG

## 2024-06-09 ASSESSMENT — PAIN SCALES - GENERAL
PAINLEVEL_OUTOF10: 8
PAINLEVEL_OUTOF10: 9
PAINLEVEL_OUTOF10: 4
PAINLEVEL_OUTOF10: 9

## 2024-06-09 ASSESSMENT — PAIN DESCRIPTION - DESCRIPTORS: DESCRIPTORS: ACHING

## 2024-06-09 NOTE — ED PROVIDER NOTES
bilirubin    Nursing staff with significant difficulty obtaining IV access.  I was unaware that they only did a straight strike for lab work.  Patient went over to CT and the CT tech called me requesting that we do scan without contrast.  I do think that this will be fine for evaluation for fluid collection as patient's body habitus will attenuate appropriately.  CT tech will change scan to without.    Discussed this patient with ER attending, Dr. Acevedo due to patient's significant immunocompromise state, comorbidities and I do think she will need admission to the hospital.  Added on Pro-Gio, lactic acid, CRP and blood cultures.  Will administer IV cefepime and vancomycin.    No indication of sepsis at this time.  No indication for significant fluid bolus that she does have a history of heart failure.    Elevated CRP at greater than 200.  Pro-Gio within normal limits at 0.06.    Significant delay in obtaining lactic acid.  It is within normal limits at 0.8.    CT of right lower extremity demonstrates a rounded 9.4 cm fluid collection of the medial aspect of the right upper leg medial to the knee with marked surrounding inflammatory changes.  There are other small adjacent fluid collection seen in this region.  There is also an incidentally noted similar-appearing fluid collection at the medial aspect of the visualized left thigh.  No acute findings of the visualized intrapelvic contents.    Contacted Ortho pediatric provider on-call,Radha Cortez, via [employer] to see if orthopedic service will see this patient for drainage of abscess.  It was queried whether patient needed admission or could follow-up outpatient.  Informed Ms. Cortez that patient needed admission and I wanted to make sure I was consulting the proper service for abscess drainage.  She requested admission to the hospitalist downtown and they will consult Ortho.    [employer] message sent to hospitalist, Dr. Long, via [employer] regarding  4.6 K/UL    Monocytes Absolute 0.2 0.1 - 1.3 K/UL    Eosinophils Absolute 0.2 0.0 - 0.8 K/UL    Basophils Absolute 0.0 0.0 - 0.2 K/UL    Immature Granulocytes Absolute 0.0 0.0 - 0.5 K/UL   Comprehensive Metabolic Panel   Result Value Ref Range    Sodium 142 136 - 145 mmol/L    Potassium 4.0 3.5 - 5.1 mmol/L    Chloride 103 98 - 107 mmol/L    CO2 30 (H) 20 - 28 mmol/L    Anion Gap 9 9 - 18 mmol/L    Glucose 104 (H) 70 - 99 mg/dL    BUN 7 (L) 8 - 23 MG/DL    Creatinine 0.55 (L) 0.60 - 1.10 MG/DL    Est, Glom Filt Rate >90 >60 ml/min/1.73m2    Calcium 8.9 8.8 - 10.2 MG/DL    Total Bilirubin 0.3 0.0 - 1.2 MG/DL    ALT 11 (L) 12 - 65 U/L    AST 19 15 - 37 U/L    Alk Phosphatase 83 35 - 104 U/L    Total Protein 7.4 6.3 - 8.2 g/dL    Albumin 2.4 (L) 3.2 - 4.6 g/dL    Globulin 5.0 (H) 2.3 - 3.5 g/dL    Albumin/Globulin Ratio 0.5 (L) 1.0 - 1.9     Procalcitonin   Result Value Ref Range    Procalcitonin 0.06 0.00 - 0.10 ng/mL   Lactate, Sepsis   Result Value Ref Range    Lactic Acid, Sepsis 0.8 0.5 - 2.0 MMOL/L   High sensitivity CRP   Result Value Ref Range    CRP, High Sensitivity >200.0 (H) 0.0 - 3.0 mg/L         CT FEMUR RIGHT WO CONTRAST   Final Result   Dominant fluid collection involving the soft tissues medial to the   right knee with multiple smaller fluid collections seen as described above with   marked surrounding inflammatory changes. The primary differential considerations   are abscess versus hematoma but given the marked surrounding inflammatory   changes and multiple sites favor the former. Repeat examination with the benefit   of intravenous contrast may be useful.         Electronically signed by Isacc Figueroa                   No results for input(s): \"COVID19\" in the last 72 hours.    Voice dictation software was used during the making of this note.  This software is not perfect and grammatical and other typographical errors may be present.  This note has not been completely proofread for errors.

## 2024-06-09 NOTE — ED TRIAGE NOTES
Pt coming from home via EMS with complaints of blistering on R leg just above knee and hard areas around the blisters. According to daughter no history of cellulitis. Numerous femur fractures. In and out of Rehab. No PT. Pt totally bed bound.

## 2024-06-10 ENCOUNTER — ANESTHESIA EVENT (OUTPATIENT)
Dept: SURGERY | Age: 74
End: 2024-06-10
Payer: MEDICARE

## 2024-06-10 ENCOUNTER — APPOINTMENT (OUTPATIENT)
Dept: GENERAL RADIOLOGY | Age: 74
DRG: 603 | End: 2024-06-10
Attending: INTERNAL MEDICINE
Payer: MEDICARE

## 2024-06-10 LAB
ALBUMIN SERPL-MCNC: 2.1 G/DL (ref 3.2–4.6)
ALBUMIN/GLOB SERPL: 0.4 (ref 1–1.9)
ALP SERPL-CCNC: 68 U/L (ref 35–104)
ALT SERPL-CCNC: 6 U/L (ref 12–65)
ANION GAP SERPL CALC-SCNC: 6 MMOL/L (ref 9–18)
AST SERPL-CCNC: 15 U/L (ref 15–37)
BASOPHILS # BLD: 0 K/UL (ref 0–0.2)
BASOPHILS NFR BLD: 1 % (ref 0–2)
BILIRUB SERPL-MCNC: 0.2 MG/DL (ref 0–1.2)
BUN SERPL-MCNC: 7 MG/DL (ref 8–23)
CALCIUM SERPL-MCNC: 8.7 MG/DL (ref 8.8–10.2)
CHLORIDE SERPL-SCNC: 105 MMOL/L (ref 98–107)
CO2 SERPL-SCNC: 30 MMOL/L (ref 20–28)
CREAT SERPL-MCNC: 0.55 MG/DL (ref 0.6–1.1)
DIFFERENTIAL METHOD BLD: ABNORMAL
EOSINOPHIL # BLD: 0.3 K/UL (ref 0–0.8)
EOSINOPHIL NFR BLD: 5 % (ref 0.5–7.8)
ERYTHROCYTE [DISTWIDTH] IN BLOOD BY AUTOMATED COUNT: 16.1 % (ref 11.9–14.6)
GLOBULIN SER CALC-MCNC: 4.9 G/DL (ref 2.3–3.5)
GLUCOSE SERPL-MCNC: 109 MG/DL (ref 70–99)
HCT VFR BLD AUTO: 28.6 % (ref 35.8–46.3)
HGB BLD-MCNC: 8.4 G/DL (ref 11.7–15.4)
IMM GRANULOCYTES # BLD AUTO: 0 K/UL (ref 0–0.5)
IMM GRANULOCYTES NFR BLD AUTO: 1 % (ref 0–5)
LYMPHOCYTES # BLD: 0.9 K/UL (ref 0.5–4.6)
LYMPHOCYTES NFR BLD: 14 % (ref 13–44)
MAGNESIUM SERPL-MCNC: 1.9 MG/DL (ref 1.8–2.4)
MCH RBC QN AUTO: 27.7 PG (ref 26.1–32.9)
MCHC RBC AUTO-ENTMCNC: 29.4 G/DL (ref 31.4–35)
MCV RBC AUTO: 94.4 FL (ref 82–102)
MONOCYTES # BLD: 0.3 K/UL (ref 0.1–1.3)
MONOCYTES NFR BLD: 5 % (ref 4–12)
NEUTS SEG # BLD: 4.5 K/UL (ref 1.7–8.2)
NEUTS SEG NFR BLD: 75 % (ref 43–78)
NRBC # BLD: 0 K/UL (ref 0–0.2)
PLATELET # BLD AUTO: 368 K/UL (ref 150–450)
PMV BLD AUTO: 9.5 FL (ref 9.4–12.3)
POTASSIUM SERPL-SCNC: 3.5 MMOL/L (ref 3.5–5.1)
PROT SERPL-MCNC: 7 G/DL (ref 6.3–8.2)
RBC # BLD AUTO: 3.03 M/UL (ref 4.05–5.2)
SODIUM SERPL-SCNC: 141 MMOL/L (ref 136–145)
WBC # BLD AUTO: 6 K/UL (ref 4.3–11.1)

## 2024-06-10 PROCEDURE — 6360000002 HC RX W HCPCS: Performed by: HOSPITALIST

## 2024-06-10 PROCEDURE — 73552 X-RAY EXAM OF FEMUR 2/>: CPT

## 2024-06-10 PROCEDURE — 6370000000 HC RX 637 (ALT 250 FOR IP): Performed by: HOSPITALIST

## 2024-06-10 PROCEDURE — 83735 ASSAY OF MAGNESIUM: CPT

## 2024-06-10 PROCEDURE — 99222 1ST HOSP IP/OBS MODERATE 55: CPT | Performed by: PHYSICIAN ASSISTANT

## 2024-06-10 PROCEDURE — 80053 COMPREHEN METABOLIC PANEL: CPT

## 2024-06-10 PROCEDURE — 36415 COLL VENOUS BLD VENIPUNCTURE: CPT

## 2024-06-10 PROCEDURE — 2580000003 HC RX 258: Performed by: HOSPITALIST

## 2024-06-10 PROCEDURE — 85025 COMPLETE CBC W/AUTO DIFF WBC: CPT

## 2024-06-10 PROCEDURE — 73560 X-RAY EXAM OF KNEE 1 OR 2: CPT

## 2024-06-10 PROCEDURE — 1100000000 HC RM PRIVATE

## 2024-06-10 RX ORDER — IPRATROPIUM BROMIDE AND ALBUTEROL SULFATE 2.5; .5 MG/3ML; MG/3ML
1 SOLUTION RESPIRATORY (INHALATION) EVERY 4 HOURS PRN
Status: DISCONTINUED | OUTPATIENT
Start: 2024-06-10 | End: 2024-06-19 | Stop reason: HOSPADM

## 2024-06-10 RX ORDER — PREDNISONE 5 MG/1
10 TABLET ORAL DAILY
Status: DISCONTINUED | OUTPATIENT
Start: 2024-06-11 | End: 2024-06-19 | Stop reason: HOSPADM

## 2024-06-10 RX ADMIN — TRAZODONE HYDROCHLORIDE 300 MG: 50 TABLET ORAL at 19:42

## 2024-06-10 RX ADMIN — ALLOPURINOL 100 MG: 100 TABLET ORAL at 09:10

## 2024-06-10 RX ADMIN — LORAZEPAM 2 MG: 1 TABLET ORAL at 19:42

## 2024-06-10 RX ADMIN — CEFEPIME 2000 MG: 2 INJECTION, POWDER, FOR SOLUTION INTRAVENOUS at 20:00

## 2024-06-10 RX ADMIN — PREDNISONE 5 MG: 5 TABLET ORAL at 09:09

## 2024-06-10 RX ADMIN — HYDROCODONE BITARTRATE AND ACETAMINOPHEN 1 TABLET: 5; 325 TABLET ORAL at 13:18

## 2024-06-10 RX ADMIN — VANCOMYCIN HYDROCHLORIDE 1500 MG: 10 INJECTION, POWDER, LYOPHILIZED, FOR SOLUTION INTRAVENOUS at 20:02

## 2024-06-10 RX ADMIN — DOCUSATE SODIUM 50 MG AND SENNOSIDES 8.6 MG 2 TABLET: 8.6; 5 TABLET, FILM COATED ORAL at 09:09

## 2024-06-10 RX ADMIN — CEFEPIME 2000 MG: 2 INJECTION, POWDER, FOR SOLUTION INTRAVENOUS at 02:20

## 2024-06-10 RX ADMIN — VANCOMYCIN HYDROCHLORIDE 1500 MG: 10 INJECTION, POWDER, LYOPHILIZED, FOR SOLUTION INTRAVENOUS at 09:18

## 2024-06-10 RX ADMIN — HYDROXYCHLOROQUINE SULFATE 200 MG: 200 TABLET ORAL at 19:42

## 2024-06-10 RX ADMIN — PREGABALIN 150 MG: 75 CAPSULE ORAL at 19:43

## 2024-06-10 RX ADMIN — TORSEMIDE 50 MG: 100 TABLET ORAL at 09:09

## 2024-06-10 RX ADMIN — CEFEPIME 2000 MG: 2 INJECTION, POWDER, FOR SOLUTION INTRAVENOUS at 11:20

## 2024-06-10 RX ADMIN — PANTOPRAZOLE SODIUM 40 MG: 40 TABLET, DELAYED RELEASE ORAL at 09:09

## 2024-06-10 RX ADMIN — SODIUM CHLORIDE, PRESERVATIVE FREE 10 ML: 5 INJECTION INTRAVENOUS at 20:16

## 2024-06-10 RX ADMIN — SODIUM CHLORIDE: 9 INJECTION, SOLUTION INTRAVENOUS at 02:18

## 2024-06-10 RX ADMIN — ESCITALOPRAM OXALATE 10 MG: 10 TABLET ORAL at 09:09

## 2024-06-10 RX ADMIN — TAMSULOSIN HYDROCHLORIDE 0.4 MG: 0.4 CAPSULE ORAL at 09:10

## 2024-06-10 RX ADMIN — HYDROXYCHLOROQUINE SULFATE 200 MG: 200 TABLET ORAL at 09:09

## 2024-06-10 RX ADMIN — DOXEPIN HYDROCHLORIDE 150 MG: 50 CAPSULE ORAL at 19:42

## 2024-06-10 RX ADMIN — HYDROCODONE BITARTRATE AND ACETAMINOPHEN 1 TABLET: 5; 325 TABLET ORAL at 09:08

## 2024-06-10 RX ADMIN — SODIUM CHLORIDE, PRESERVATIVE FREE 10 ML: 5 INJECTION INTRAVENOUS at 09:18

## 2024-06-10 RX ADMIN — PREGABALIN 150 MG: 75 CAPSULE ORAL at 09:09

## 2024-06-10 ASSESSMENT — PAIN SCALES - GENERAL: PAINLEVEL_OUTOF10: 8

## 2024-06-10 NOTE — PERIOP NOTE
Tc to melissa Baez on 6th floor advising her pt on OR schedule for tomorrow, 06/11/2024 and pt to be brought to preop at 0815,  npo after midnight, dressed for OR w/working iv-she verb understanding of all info given/thogan,rn

## 2024-06-10 NOTE — CARE COORDINATION
06/10/24 1409   Service Assessment   Patient Orientation Alert and Oriented   Cognition Alert   History Provided By Patient   Primary Caregiver Self   Accompanied By/Relationship daughter   Support Systems Children   Patient's Healthcare Decision Maker is: Legal Next of Kin   PCP Verified by CM Yes   Last Visit to PCP Within last 3 months   Prior Functional Level Assistance with the following:;Bathing;Dressing;Cooking;Mobility   Current Functional Level Assistance with the following:   Can patient return to prior living arrangement Unknown at present   Ability to make needs known: Good   Family able to assist with home care needs: Yes   Would you like for me to discuss the discharge plan with any other family members/significant others, and if so, who? Yes  (daughter)   Financial Resources Medicare;Medicaid   Community Resources None   Social/Functional History   Lives With Alone   Type of Home House   Condition of Participation: Discharge Planning   The Plan for Transition of Care is related to the following treatment goals: return home assistance from daughter   The Patient and/or Patient Representative was provided with a Choice of Provider? Patient   The Patient and/Or Patient Representative agree with the Discharge Plan? Yes   Freedom of Choice list was provided with basic dialogue that supports the patient's individualized plan of care/goals, treatment preferences, and shares the quality data associated with the providers?  Yes     Assessment completed with patient at bedside, daughterMigdalia present as well. Patient lives alone, daughter lives next door. Patient needs assistance with dressing, toileting, and cooking. Patient uses a wheelchair for mobility. Patient reports daughter assist her with her ADLs when she is not working. Patient states she had [facility] caregiver but they were stopped due to a prolonged hospital stay in the past. [facility] referral made with confirmation #[id number]. Demographics and PCP

## 2024-06-10 NOTE — H&P
Outpatient Medications   Medication Instructions    acetaminophen (TYLENOL) 1,000 mg, Oral, 3 TIMES DAILY    albuterol sulfate HFA (VENTOLIN HFA) 108 (90 Base) MCG/ACT inhaler INHALE 2 PUFF USING INHALER EVERY FOUR HOURS AS NEEDED FOR SHORTNESS OF BREATH OR WHEEZING    allopurinol (ZYLOPRIM) 100 MG tablet     amLODIPine (NORVASC) 2.5 mg, Oral, DAILY    aspirin 81 mg, Oral, DAILY    bisacodyl (DULCOLAX) 10 mg, Rectal, DAILY PRN    celecoxib (CELEBREX) 200 MG capsule Oral, DAILY    dextromethorphan-guaiFENesin (MUCINEX DM)  MG per extended release tablet 1 tablet, Oral, EVERY 12 HOURS    diclofenac sodium (VOLTAREN) 1 % GEL Topical, 3 TIMES DAILY    docusate (COLACE, DULCOLAX) 200 mg, Oral, DAILY    doxepin (SINEQUAN) 150 mg, Oral, NIGHTLY    ENBREL SURECLICK 50 MG/ML SOAJ     ergocalciferol (ERGOCALCIFEROL) 50,000 Units, Oral, WEEKLY    escitalopram (LEXAPRO) 10 mg, Oral, DAILY    ferrous sulfate (IRON 325) 325 mg, Oral, DAILY WITH BREAKFAST    folic acid (FOLVITE) 1 mg, Oral, DAILY    glucose (GLUTOSE) 15 g, Oral, PRN    HYDROcodone-acetaminophen (NORCO) 5-325 MG per tablet     hydroxychloroquine (PLAQUENIL) 200 mg, Oral, 2 TIMES DAILY    ipratropium 0.5 mg-albuterol 2.5 mg (DUONEB) 0.5-2.5 (3) MG/3ML SOLN nebulizer solution 3 mLs, Inhalation, 3 TIMES DAILY    levoFLOXacin (LEVAQUIN) 250 MG tablet     lidocaine 4 % external patch 1 patch, TransDERmal, DAILY    LORazepam (ATIVAN) 2 mg, Oral, NIGHTLY    magnesium oxide (MAG-OX) 400 mg, Oral, DAILY    melatonin 3 mg, Oral, EVERY BEDTIME    Menthol, Topical Analgesic, (BIOFREEZE PROFESSIONAL) 5 % GEL 1 Application, Topical, 4 TIMES DAILY PRN    methotrexate Sodium 50 MG/2ML chemo injection     metOLazone (ZAROXOLYN) 5 mg, Oral, DAILY PRN    metoprolol succinate (TOPROL XL) 50 mg, Oral, DAILY    naloxone 4 mg, ONCE PRN    nicotine (NICODERM CQ) 14 MG/24HR 1 patch, TransDERmal, DAILY PRN    ondansetron (ZOFRAN-ODT) 4 mg, Oral, EVERY 4 HOURS PRN    pantoprazole  smaller adjacent fluid collections seen in this region. There is also an incidentally noted similar-appearing fluid collection at the medial aspect of the visualized left thigh. No acute finding of the visualized intrapelvic contents.     Dominant fluid collection involving the soft tissues medial to the right knee with multiple smaller fluid collections seen as described above with marked surrounding inflammatory changes. The primary differential considerations are abscess versus hematoma but given the marked surrounding inflammatory changes and multiple sites favor the former. Repeat examination with the benefit of intravenous contrast may be useful. Electronically signed by Isacc Figueroa        Signed:  Joe Long MD    Part of this note may have been written by using a voice dictation software.  The note has been proof read but may still contain some grammatical/other typographical errors.

## 2024-06-10 NOTE — PLAN OF CARE
Problem: Discharge Planning  Goal: Discharge to home or other facility with appropriate resources  6/10/2024 0951 by Tosin Mann RN  Outcome: Progressing  6/10/2024 0007 by Alan Crews RN  Outcome: Progressing     Problem: Skin/Tissue Integrity  Goal: Absence of new skin breakdown  Description: 1.  Monitor for areas of redness and/or skin breakdown  2.  Assess vascular access sites hourly  3.  Every 4-6 hours minimum:  Change oxygen saturation probe site  4.  Every 4-6 hours:  If on nasal continuous positive airway pressure, respiratory therapy assess nares and determine need for appliance change or resting period.  6/10/2024 0951 by Tosin Mann RN  Outcome: Progressing  6/10/2024 0007 by Alan Crews RN  Outcome: Progressing     Problem: Safety - Adult  Goal: Free from fall injury  6/10/2024 0951 by Tosin Mann RN  Outcome: Progressing  6/10/2024 0007 by Alan Crews RN  Outcome: Progressing     Problem: Pain  Goal: Verbalizes/displays adequate comfort level or baseline comfort level  6/10/2024 0951 by Tosin Mann RN  Outcome: Progressing  6/10/2024 0007 by Alan Crews, RN  Outcome: Progressing

## 2024-06-10 NOTE — ED NOTES
TRANSFER - OUT REPORT:    Verbal report given to CANDICE Phillips on Ama Chu  being transferred to  633 for routine progression of patient care       Report consisted of patient's Situation, Background, Assessment and   Recommendations(SBAR).     Information from the following report(s) ED SBAR was reviewed with the receiving nurse.    Lines:   Peripheral IV 06/09/24 Left Antecubital (Active)        Opportunity for questions and clarification was provided.      Patient transported with:  EMS

## 2024-06-11 ENCOUNTER — ANESTHESIA (OUTPATIENT)
Dept: SURGERY | Age: 74
End: 2024-06-11
Payer: MEDICARE

## 2024-06-11 LAB
ABO + RH BLD: NORMAL
ANION GAP SERPL CALC-SCNC: 6 MMOL/L (ref 9–18)
BLOOD GROUP ANTIBODIES SERPL: NORMAL
BUN SERPL-MCNC: 11 MG/DL (ref 8–23)
CALCIUM SERPL-MCNC: 8.2 MG/DL (ref 8.8–10.2)
CHLORIDE SERPL-SCNC: 103 MMOL/L (ref 98–107)
CO2 SERPL-SCNC: 32 MMOL/L (ref 20–28)
CREAT SERPL-MCNC: 0.86 MG/DL (ref 0.6–1.1)
ERYTHROCYTE [DISTWIDTH] IN BLOOD BY AUTOMATED COUNT: 16.4 % (ref 11.9–14.6)
GLUCOSE SERPL-MCNC: 94 MG/DL (ref 70–99)
HCT VFR BLD AUTO: 27 % (ref 35.8–46.3)
HGB BLD-MCNC: 7.9 G/DL (ref 11.7–15.4)
MCH RBC QN AUTO: 28.3 PG (ref 26.1–32.9)
MCHC RBC AUTO-ENTMCNC: 29.3 G/DL (ref 31.4–35)
MCV RBC AUTO: 96.8 FL (ref 82–102)
NRBC # BLD: 0 K/UL (ref 0–0.2)
PLATELET # BLD AUTO: 344 K/UL (ref 150–450)
PMV BLD AUTO: 9.8 FL (ref 9.4–12.3)
POTASSIUM SERPL-SCNC: 3.7 MMOL/L (ref 3.5–5.1)
RBC # BLD AUTO: 2.79 M/UL (ref 4.05–5.2)
SODIUM SERPL-SCNC: 141 MMOL/L (ref 136–145)
SPECIMEN EXP DATE BLD: NORMAL
WBC # BLD AUTO: 5.8 K/UL (ref 4.3–11.1)

## 2024-06-11 PROCEDURE — 85027 COMPLETE CBC AUTOMATED: CPT

## 2024-06-11 PROCEDURE — 87206 SMEAR FLUORESCENT/ACID STAI: CPT

## 2024-06-11 PROCEDURE — 6360000002 HC RX W HCPCS: Performed by: ORTHOPAEDIC SURGERY

## 2024-06-11 PROCEDURE — 3600000002 HC SURGERY LEVEL 2 BASE: Performed by: ORTHOPAEDIC SURGERY

## 2024-06-11 PROCEDURE — 6370000000 HC RX 637 (ALT 250 FOR IP): Performed by: ANESTHESIOLOGY

## 2024-06-11 PROCEDURE — 3700000000 HC ANESTHESIA ATTENDED CARE: Performed by: ORTHOPAEDIC SURGERY

## 2024-06-11 PROCEDURE — 3600000012 HC SURGERY LEVEL 2 ADDTL 15MIN: Performed by: ORTHOPAEDIC SURGERY

## 2024-06-11 PROCEDURE — 7100000001 HC PACU RECOVERY - ADDTL 15 MIN: Performed by: ORTHOPAEDIC SURGERY

## 2024-06-11 PROCEDURE — 6370000000 HC RX 637 (ALT 250 FOR IP): Performed by: HOSPITALIST

## 2024-06-11 PROCEDURE — 87205 SMEAR GRAM STAIN: CPT

## 2024-06-11 PROCEDURE — 87176 TISSUE HOMOGENIZATION CULTR: CPT

## 2024-06-11 PROCEDURE — 7100000000 HC PACU RECOVERY - FIRST 15 MIN: Performed by: ORTHOPAEDIC SURGERY

## 2024-06-11 PROCEDURE — 0J9L0ZZ DRAINAGE OF RIGHT UPPER LEG SUBCUTANEOUS TISSUE AND FASCIA, OPEN APPROACH: ICD-10-PCS | Performed by: ORTHOPAEDIC SURGERY

## 2024-06-11 PROCEDURE — 86850 RBC ANTIBODY SCREEN: CPT

## 2024-06-11 PROCEDURE — 87075 CULTR BACTERIA EXCEPT BLOOD: CPT

## 2024-06-11 PROCEDURE — 2580000003 HC RX 258: Performed by: HOSPITALIST

## 2024-06-11 PROCEDURE — 6360000002 HC RX W HCPCS: Performed by: HOSPITALIST

## 2024-06-11 PROCEDURE — 1100000000 HC RM PRIVATE

## 2024-06-11 PROCEDURE — 2580000003 HC RX 258: Performed by: ANESTHESIOLOGY

## 2024-06-11 PROCEDURE — 6360000002 HC RX W HCPCS

## 2024-06-11 PROCEDURE — 36415 COLL VENOUS BLD VENIPUNCTURE: CPT

## 2024-06-11 PROCEDURE — 87077 CULTURE AEROBIC IDENTIFY: CPT

## 2024-06-11 PROCEDURE — 27301 DRAIN THIGH/KNEE LESION: CPT | Performed by: ORTHOPAEDIC SURGERY

## 2024-06-11 PROCEDURE — 2580000003 HC RX 258: Performed by: ORTHOPAEDIC SURGERY

## 2024-06-11 PROCEDURE — 2580000003 HC RX 258: Performed by: STUDENT IN AN ORGANIZED HEALTH CARE EDUCATION/TRAINING PROGRAM

## 2024-06-11 PROCEDURE — 86901 BLOOD TYPING SEROLOGIC RH(D): CPT

## 2024-06-11 PROCEDURE — 6370000000 HC RX 637 (ALT 250 FOR IP): Performed by: STUDENT IN AN ORGANIZED HEALTH CARE EDUCATION/TRAINING PROGRAM

## 2024-06-11 PROCEDURE — 87186 SC STD MICRODIL/AGAR DIL: CPT

## 2024-06-11 PROCEDURE — 87070 CULTURE OTHR SPECIMN AEROBIC: CPT

## 2024-06-11 PROCEDURE — 6370000000 HC RX 637 (ALT 250 FOR IP): Performed by: ORTHOPAEDIC SURGERY

## 2024-06-11 PROCEDURE — 6360000002 HC RX W HCPCS: Performed by: STUDENT IN AN ORGANIZED HEALTH CARE EDUCATION/TRAINING PROGRAM

## 2024-06-11 PROCEDURE — 2580000003 HC RX 258

## 2024-06-11 PROCEDURE — 2500000003 HC RX 250 WO HCPCS

## 2024-06-11 PROCEDURE — 86900 BLOOD TYPING SEROLOGIC ABO: CPT

## 2024-06-11 PROCEDURE — 87102 FUNGUS ISOLATION CULTURE: CPT

## 2024-06-11 PROCEDURE — 2709999900 HC NON-CHARGEABLE SUPPLY: Performed by: ORTHOPAEDIC SURGERY

## 2024-06-11 PROCEDURE — 80048 BASIC METABOLIC PNL TOTAL CA: CPT

## 2024-06-11 PROCEDURE — 87116 MYCOBACTERIA CULTURE: CPT

## 2024-06-11 PROCEDURE — 3700000001 HC ADD 15 MINUTES (ANESTHESIA): Performed by: ORTHOPAEDIC SURGERY

## 2024-06-11 RX ORDER — SODIUM CHLORIDE 9 MG/ML
INJECTION, SOLUTION INTRAVENOUS CONTINUOUS
Status: DISCONTINUED | OUTPATIENT
Start: 2024-06-11 | End: 2024-06-12

## 2024-06-11 RX ORDER — SODIUM CHLORIDE 0.9 % (FLUSH) 0.9 %
5-40 SYRINGE (ML) INJECTION PRN
Status: DISCONTINUED | OUTPATIENT
Start: 2024-06-11 | End: 2024-06-19 | Stop reason: HOSPADM

## 2024-06-11 RX ORDER — SODIUM CHLORIDE 9 MG/ML
INJECTION, SOLUTION INTRAVENOUS PRN
Status: DISCONTINUED | OUTPATIENT
Start: 2024-06-11 | End: 2024-06-11 | Stop reason: HOSPADM

## 2024-06-11 RX ORDER — SODIUM CHLORIDE, SODIUM LACTATE, POTASSIUM CHLORIDE, CALCIUM CHLORIDE 600; 310; 30; 20 MG/100ML; MG/100ML; MG/100ML; MG/100ML
INJECTION, SOLUTION INTRAVENOUS CONTINUOUS
Status: DISCONTINUED | OUTPATIENT
Start: 2024-06-11 | End: 2024-06-11 | Stop reason: HOSPADM

## 2024-06-11 RX ORDER — HALOPERIDOL 5 MG/ML
1 INJECTION INTRAMUSCULAR
Status: DISCONTINUED | OUTPATIENT
Start: 2024-06-11 | End: 2024-06-11 | Stop reason: HOSPADM

## 2024-06-11 RX ORDER — PROPOFOL 10 MG/ML
INJECTION, EMULSION INTRAVENOUS PRN
Status: DISCONTINUED | OUTPATIENT
Start: 2024-06-11 | End: 2024-06-11 | Stop reason: SDUPTHER

## 2024-06-11 RX ORDER — ACETAMINOPHEN 500 MG
1000 TABLET ORAL ONCE
Status: COMPLETED | OUTPATIENT
Start: 2024-06-11 | End: 2024-06-11

## 2024-06-11 RX ORDER — HYDROMORPHONE HYDROCHLORIDE 1 MG/ML
0.5 INJECTION, SOLUTION INTRAMUSCULAR; INTRAVENOUS; SUBCUTANEOUS EVERY 4 HOURS PRN
Status: DISCONTINUED | OUTPATIENT
Start: 2024-06-11 | End: 2024-06-19 | Stop reason: HOSPADM

## 2024-06-11 RX ORDER — FENTANYL CITRATE 50 UG/ML
INJECTION, SOLUTION INTRAMUSCULAR; INTRAVENOUS PRN
Status: DISCONTINUED | OUTPATIENT
Start: 2024-06-11 | End: 2024-06-11 | Stop reason: SDUPTHER

## 2024-06-11 RX ORDER — IBUPROFEN 600 MG/1
1 TABLET ORAL PRN
Status: DISCONTINUED | OUTPATIENT
Start: 2024-06-11 | End: 2024-06-11 | Stop reason: HOSPADM

## 2024-06-11 RX ORDER — SODIUM CHLORIDE 0.9 % (FLUSH) 0.9 %
5-40 SYRINGE (ML) INJECTION EVERY 12 HOURS SCHEDULED
Status: DISCONTINUED | OUTPATIENT
Start: 2024-06-11 | End: 2024-06-11 | Stop reason: HOSPADM

## 2024-06-11 RX ORDER — SODIUM CHLORIDE 0.9 % (FLUSH) 0.9 %
5-40 SYRINGE (ML) INJECTION PRN
Status: DISCONTINUED | OUTPATIENT
Start: 2024-06-11 | End: 2024-06-11 | Stop reason: HOSPADM

## 2024-06-11 RX ORDER — LIDOCAINE HYDROCHLORIDE 10 MG/ML
1 INJECTION, SOLUTION INFILTRATION; PERINEURAL
Status: DISCONTINUED | OUTPATIENT
Start: 2024-06-11 | End: 2024-06-11 | Stop reason: HOSPADM

## 2024-06-11 RX ORDER — ROCURONIUM BROMIDE 10 MG/ML
INJECTION, SOLUTION INTRAVENOUS PRN
Status: DISCONTINUED | OUTPATIENT
Start: 2024-06-11 | End: 2024-06-11 | Stop reason: SDUPTHER

## 2024-06-11 RX ORDER — ASPIRIN 325 MG
325 TABLET, DELAYED RELEASE (ENTERIC COATED) ORAL DAILY
Status: DISCONTINUED | OUTPATIENT
Start: 2024-06-11 | End: 2024-06-19 | Stop reason: HOSPADM

## 2024-06-11 RX ORDER — GENTAMICIN SULFATE 80 MG/100ML
INJECTION, SOLUTION INTRAVENOUS CONTINUOUS PRN
Status: COMPLETED | OUTPATIENT
Start: 2024-06-11 | End: 2024-06-11

## 2024-06-11 RX ORDER — ONDANSETRON 2 MG/ML
4 INJECTION INTRAMUSCULAR; INTRAVENOUS
Status: DISCONTINUED | OUTPATIENT
Start: 2024-06-11 | End: 2024-06-11 | Stop reason: HOSPADM

## 2024-06-11 RX ORDER — MIDAZOLAM HYDROCHLORIDE 2 MG/2ML
2 INJECTION, SOLUTION INTRAMUSCULAR; INTRAVENOUS
Status: DISCONTINUED | OUTPATIENT
Start: 2024-06-11 | End: 2024-06-11 | Stop reason: HOSPADM

## 2024-06-11 RX ORDER — DEXTROSE MONOHYDRATE 100 MG/ML
INJECTION, SOLUTION INTRAVENOUS CONTINUOUS PRN
Status: DISCONTINUED | OUTPATIENT
Start: 2024-06-11 | End: 2024-06-11 | Stop reason: HOSPADM

## 2024-06-11 RX ORDER — LIDOCAINE HYDROCHLORIDE 20 MG/ML
INJECTION, SOLUTION EPIDURAL; INFILTRATION; INTRACAUDAL; PERINEURAL PRN
Status: DISCONTINUED | OUTPATIENT
Start: 2024-06-11 | End: 2024-06-11 | Stop reason: SDUPTHER

## 2024-06-11 RX ORDER — EPHEDRINE SULFATE 5 MG/ML
INJECTION INTRAVENOUS PRN
Status: DISCONTINUED | OUTPATIENT
Start: 2024-06-11 | End: 2024-06-11 | Stop reason: SDUPTHER

## 2024-06-11 RX ORDER — NALOXONE HYDROCHLORIDE 0.4 MG/ML
INJECTION, SOLUTION INTRAMUSCULAR; INTRAVENOUS; SUBCUTANEOUS PRN
Status: DISCONTINUED | OUTPATIENT
Start: 2024-06-11 | End: 2024-06-11 | Stop reason: HOSPADM

## 2024-06-11 RX ORDER — DEXAMETHASONE SODIUM PHOSPHATE 4 MG/ML
INJECTION, SOLUTION INTRA-ARTICULAR; INTRALESIONAL; INTRAMUSCULAR; INTRAVENOUS; SOFT TISSUE PRN
Status: DISCONTINUED | OUTPATIENT
Start: 2024-06-11 | End: 2024-06-11 | Stop reason: SDUPTHER

## 2024-06-11 RX ORDER — OXYCODONE HYDROCHLORIDE 5 MG/1
5 TABLET ORAL
Status: DISCONTINUED | OUTPATIENT
Start: 2024-06-11 | End: 2024-06-11 | Stop reason: HOSPADM

## 2024-06-11 RX ORDER — HYDROMORPHONE HYDROCHLORIDE 2 MG/ML
0.25 INJECTION, SOLUTION INTRAMUSCULAR; INTRAVENOUS; SUBCUTANEOUS EVERY 5 MIN PRN
Status: DISCONTINUED | OUTPATIENT
Start: 2024-06-11 | End: 2024-06-11 | Stop reason: HOSPADM

## 2024-06-11 RX ORDER — SODIUM CHLORIDE 0.9 % (FLUSH) 0.9 %
5-40 SYRINGE (ML) INJECTION EVERY 12 HOURS SCHEDULED
Status: DISCONTINUED | OUTPATIENT
Start: 2024-06-11 | End: 2024-06-19 | Stop reason: HOSPADM

## 2024-06-11 RX ORDER — ONDANSETRON 2 MG/ML
INJECTION INTRAMUSCULAR; INTRAVENOUS PRN
Status: DISCONTINUED | OUTPATIENT
Start: 2024-06-11 | End: 2024-06-11 | Stop reason: SDUPTHER

## 2024-06-11 RX ORDER — SODIUM CHLORIDE 9 MG/ML
INJECTION, SOLUTION INTRAVENOUS PRN
Status: DISCONTINUED | OUTPATIENT
Start: 2024-06-11 | End: 2024-06-19 | Stop reason: HOSPADM

## 2024-06-11 RX ORDER — SUCCINYLCHOLINE CHLORIDE 20 MG/ML
INJECTION INTRAMUSCULAR; INTRAVENOUS PRN
Status: DISCONTINUED | OUTPATIENT
Start: 2024-06-11 | End: 2024-06-11 | Stop reason: SDUPTHER

## 2024-06-11 RX ADMIN — HYDROXYCHLOROQUINE SULFATE 200 MG: 200 TABLET ORAL at 20:34

## 2024-06-11 RX ADMIN — PROPOFOL 150 MG: 10 INJECTION, EMULSION INTRAVENOUS at 10:49

## 2024-06-11 RX ADMIN — EPHEDRINE SULFATE 10 MG: 5 INJECTION INTRAVENOUS at 11:06

## 2024-06-11 RX ADMIN — FENTANYL CITRATE 50 MCG: 50 INJECTION, SOLUTION INTRAMUSCULAR; INTRAVENOUS at 11:35

## 2024-06-11 RX ADMIN — SODIUM CHLORIDE, PRESERVATIVE FREE 10 ML: 5 INJECTION INTRAVENOUS at 20:36

## 2024-06-11 RX ADMIN — ALLOPURINOL 100 MG: 100 TABLET ORAL at 08:02

## 2024-06-11 RX ADMIN — PHENYLEPHRINE HYDROCHLORIDE 200 MCG: 10 INJECTION INTRAVENOUS at 11:01

## 2024-06-11 RX ADMIN — PREGABALIN 150 MG: 75 CAPSULE ORAL at 08:02

## 2024-06-11 RX ADMIN — ESCITALOPRAM OXALATE 10 MG: 10 TABLET ORAL at 08:02

## 2024-06-11 RX ADMIN — HYDROXYCHLOROQUINE SULFATE 200 MG: 200 TABLET ORAL at 08:02

## 2024-06-11 RX ADMIN — PANTOPRAZOLE SODIUM 40 MG: 40 TABLET, DELAYED RELEASE ORAL at 08:02

## 2024-06-11 RX ADMIN — Medication 160 MG: at 10:49

## 2024-06-11 RX ADMIN — TAMSULOSIN HYDROCHLORIDE 0.4 MG: 0.4 CAPSULE ORAL at 08:02

## 2024-06-11 RX ADMIN — Medication 1 AMPULE: at 08:04

## 2024-06-11 RX ADMIN — LIDOCAINE HYDROCHLORIDE 100 MG: 20 INJECTION, SOLUTION EPIDURAL; INFILTRATION; INTRACAUDAL; PERINEURAL at 10:49

## 2024-06-11 RX ADMIN — DEXAMETHASONE SODIUM PHOSPHATE 4 MG: 4 INJECTION, SOLUTION INTRAMUSCULAR; INTRAVENOUS at 11:03

## 2024-06-11 RX ADMIN — SODIUM CHLORIDE, PRESERVATIVE FREE 10 ML: 5 INJECTION INTRAVENOUS at 08:05

## 2024-06-11 RX ADMIN — SODIUM CHLORIDE, SODIUM LACTATE, POTASSIUM CHLORIDE, AND CALCIUM CHLORIDE: 600; 310; 30; 20 INJECTION, SOLUTION INTRAVENOUS at 10:34

## 2024-06-11 RX ADMIN — HYDROCODONE BITARTRATE AND ACETAMINOPHEN 1 TABLET: 5; 325 TABLET ORAL at 17:42

## 2024-06-11 RX ADMIN — DOXEPIN HYDROCHLORIDE 150 MG: 50 CAPSULE ORAL at 20:35

## 2024-06-11 RX ADMIN — PREDNISONE 10 MG: 5 TABLET ORAL at 08:02

## 2024-06-11 RX ADMIN — LORAZEPAM 2 MG: 1 TABLET ORAL at 20:35

## 2024-06-11 RX ADMIN — TRAMADOL HYDROCHLORIDE 50 MG: 50 TABLET ORAL at 19:06

## 2024-06-11 RX ADMIN — CEFEPIME 2000 MG: 2 INJECTION, POWDER, FOR SOLUTION INTRAVENOUS at 17:49

## 2024-06-11 RX ADMIN — PHENYLEPHRINE HYDROCHLORIDE 100 MCG: 10 INJECTION INTRAVENOUS at 10:59

## 2024-06-11 RX ADMIN — SODIUM CHLORIDE, PRESERVATIVE FREE 10 ML: 5 INJECTION INTRAVENOUS at 20:33

## 2024-06-11 RX ADMIN — TRAZODONE HYDROCHLORIDE 300 MG: 50 TABLET ORAL at 20:35

## 2024-06-11 RX ADMIN — VANCOMYCIN HYDROCHLORIDE 1250 MG: 10 INJECTION, POWDER, LYOPHILIZED, FOR SOLUTION INTRAVENOUS at 21:22

## 2024-06-11 RX ADMIN — ONDANSETRON 4 MG: 2 INJECTION INTRAMUSCULAR; INTRAVENOUS at 11:03

## 2024-06-11 RX ADMIN — FENTANYL CITRATE 50 MCG: 50 INJECTION, SOLUTION INTRAMUSCULAR; INTRAVENOUS at 10:49

## 2024-06-11 RX ADMIN — VANCOMYCIN HYDROCHLORIDE 1500 MG: 10 INJECTION, POWDER, LYOPHILIZED, FOR SOLUTION INTRAVENOUS at 09:24

## 2024-06-11 RX ADMIN — ROCURONIUM BROMIDE 5 MG: 10 INJECTION, SOLUTION INTRAVENOUS at 10:49

## 2024-06-11 RX ADMIN — ACETAMINOPHEN 1000 MG: 500 TABLET, FILM COATED ORAL at 08:02

## 2024-06-11 RX ADMIN — PREGABALIN 150 MG: 75 CAPSULE ORAL at 20:35

## 2024-06-11 RX ADMIN — CEFEPIME 2000 MG: 2 INJECTION, POWDER, FOR SOLUTION INTRAVENOUS at 02:02

## 2024-06-11 RX ADMIN — PHENYLEPHRINE HYDROCHLORIDE 100 MCG: 10 INJECTION INTRAVENOUS at 10:54

## 2024-06-11 ASSESSMENT — PAIN - FUNCTIONAL ASSESSMENT
PAIN_FUNCTIONAL_ASSESSMENT: NONE - DENIES PAIN
PAIN_FUNCTIONAL_ASSESSMENT: 0-10
PAIN_FUNCTIONAL_ASSESSMENT: PREVENTS OR INTERFERES SOME ACTIVE ACTIVITIES AND ADLS
PAIN_FUNCTIONAL_ASSESSMENT: NONE - DENIES PAIN

## 2024-06-11 ASSESSMENT — PAIN DESCRIPTION - ORIENTATION: ORIENTATION: LEFT

## 2024-06-11 ASSESSMENT — PAIN DESCRIPTION - LOCATION: LOCATION: KNEE

## 2024-06-11 ASSESSMENT — PAIN SCALES - GENERAL
PAINLEVEL_OUTOF10: 6
PAINLEVEL_OUTOF10: 7
PAINLEVEL_OUTOF10: 2

## 2024-06-11 ASSESSMENT — PAIN DESCRIPTION - DESCRIPTORS: DESCRIPTORS: ACHING

## 2024-06-11 NOTE — PLAN OF CARE
Problem: Discharge Planning  Goal: Discharge to home or other facility with appropriate resources  6/11/2024 1035 by Tosin Mann RN  Outcome: Progressing  6/10/2024 2102 by Yareli Head RN  Outcome: Progressing     Problem: Skin/Tissue Integrity  Goal: Absence of new skin breakdown  Description: 1.  Monitor for areas of redness and/or skin breakdown  2.  Assess vascular access sites hourly  3.  Every 4-6 hours minimum:  Change oxygen saturation probe site  4.  Every 4-6 hours:  If on nasal continuous positive airway pressure, respiratory therapy assess nares and determine need for appliance change or resting period.  6/11/2024 1035 by Tosin Mann RN  Outcome: Progressing  6/10/2024 2102 by Yareli Head RN  Outcome: Progressing     Problem: Safety - Adult  Goal: Free from fall injury  6/11/2024 1035 by Tosin Mann RN  Outcome: Progressing  6/10/2024 2102 by Yareli Head RN  Outcome: Progressing     Problem: Pain  Goal: Verbalizes/displays adequate comfort level or baseline comfort level  6/11/2024 1035 by Tosin Mann RN  Outcome: Progressing  6/10/2024 2102 by Yareli Head RN  Outcome: Progressing

## 2024-06-11 NOTE — ANESTHESIA POSTPROCEDURE EVALUATION
Department of Anesthesiology  Postprocedure Note    Patient: Ama Chu  MRN: [id number]  YOB: 1950  Date of evaluation: 6/11/2024    Procedure Summary       Date: 06/11/24 Room / Location: [facility] MAIN OR 08 / [facility] MAIN OR    Anesthesia Start: 1034 Anesthesia Stop: 1147    Procedure: LEG DEBRIDEMENT INCISION AND DRAINAGE (Right) Diagnosis:       Abscess of right thigh      (Abscess of right thigh [L02.415])    Providers: Balaji Gutierrez MD Responsible Provider: Benjamin Stallings DO    Anesthesia Type: general ASA Status: 4            Anesthesia Type: No value filed.    Kandi Phase I: Kandi Score: 9    Kandi Phase II:      Anesthesia Post Evaluation    Patient location during evaluation: PACU  Level of consciousness: awake and alert  Airway patency: patent  Nausea & Vomiting: no nausea  Cardiovascular status: hemodynamically stable  Respiratory status: acceptable  Hydration status: euvolemic  Pain management: satisfactory to patient    No notable events documented.

## 2024-06-11 NOTE — OP NOTE
Operative Note      Patient: Ama Chu  YOB: 1950  MRN: [id number]    Date of Procedure: 6/11/2024    Pre-Op Diagnosis Codes:     * Abscess of right thigh [L02.415]    Post-Op Diagnosis: Same       Procedure(s):  LEG DEBRIDEMENT INCISION AND DRAINAGE    Surgeon(s):  Balaji Gutierrez MD    Assistant:   * No surgical staff found *    Anesthesia: Choice    Estimated Blood Loss (mL): less than 50     Complications: None    Specimens:   ID Type Source Tests Collected by Time Destination   1 : right thigh abscess tissue for aerobic, anaerobic, afb, and fungal Tissue Thigh CULTURE, ANAEROBIC, CULTURE, FUNGUS, CULTURE, TISSUE, AFB CULTURE + SMEAR W/RFLX ID FROM CULTURE Balaji Gutierrez MD 6/11/2024 1112        Implants:  * No implants in log *      Drains:   Negative Pressure Wound Therapy Leg Left;Upper (Active)       Negative Pressure Wound Therapy Leg Right;Upper;Inner (Active)       External Urinary Catheter (Active)   Site Assessment Clean;Intact 06/11/24 0730   Catheter Care Catheter/Wick replaced 06/11/24 0430   Perineal Care Yes 06/11/24 0430   Suction 40 mmgHg continuous 06/10/24 2310   Urine Color Yellow 06/10/24 1540   Output (mL) 500 mL 06/11/24 0430       [REMOVED] External Urinary Catheter (Removed)   Placement Replaced 06/10/24 0954   Catheter Care Catheter/Wick replaced 06/10/24 0954   Output (mL) 1100 mL 06/10/24 1217       Findings:  Infection Present At Time Of Surgery (PATOS) (choose all levels that have infection present):  - Superficial Infection (skin/subcutaneous) present as evidenced by purulent fluid  Other Findings:     Detailed Description of Procedure:   After induction of general anesthesia patient was carefully transferred to the operating table which had extensions placed on both sides with the arm boards to accommodate her large girth.  Right ankle was then elevated on a leg pineda with good padding and secured to the leg is in some flexion and abduction of the hip.

## 2024-06-11 NOTE — ANESTHESIA PRE PROCEDURE
Department of Anesthesiology  Preprocedure Note       Name:  Ama Chu   Age:  74 y.o.  :  1950                                          MRN:  [id number]         Date:  6/10/2024      Surgeon: Surgeon(s):  Balaji Gutierrez MD    Procedure: Procedure(s):  LEG DEBRIDEMENT INCISION AND DRAINAGE    Medications prior to admission:   Prior to Admission medications    Medication Sig Start Date End Date Taking? Authorizing Provider   traMADol (ULTRAM) 50 MG tablet Take 1 tablet by mouth every 6 hours as needed for Pain. 24   Anita Vee MD   acetaminophen (TYLENOL) 500 MG tablet Take 2 tablets by mouth 3 times daily 24   Anita Vee MD   albuterol sulfate HFA (VENTOLIN HFA) 108 (90 Base) MCG/ACT inhaler INHALE 2 PUFF USING INHALER EVERY FOUR HOURS AS NEEDED FOR SHORTNESS OF BREATH OR WHEEZING  Patient not taking: Reported on 2024   Anita Vee MD   allopurinol (ZYLOPRIM) 100 MG tablet  3/19/24   Anita Vee MD   amLODIPine (NORVASC) 2.5 MG tablet Take 1 tablet by mouth daily  Patient not taking: Reported on 6/10/2024    Anita Vee MD   bisacodyl (DULCOLAX) 10 MG suppository Place 1 suppository rectally daily as needed 24   Anita Vee MD   celecoxib (CELEBREX) 200 MG capsule Take by mouth daily    Anita Vee MD   dextromethorphan-guaiFENesin (MUCINEX DM)  MG per extended release tablet Take 1 tablet by mouth in the morning and 1 tablet in the evening. 3/19/24   Anita Vee MD   glucose (GLUTOSE) 40 % GEL Take 37.5 mLs by mouth as needed  Patient not taking: Reported on 2024   Anita Vee MD   diclofenac sodium (VOLTAREN) 1 % GEL Apply topically 3 times daily 9/15/21   Anita Vee MD   docusate (COLACE, DULCOLAX) 100 MG CAPS Take 200 mg by mouth daily 24   Anita Vee MD   ENBREL SURECLICK 50 MG/ML SOAJ  3/5/24   Anita Vee MD

## 2024-06-11 NOTE — PERIOP NOTE
TRANSFER - OUT REPORT:    Verbal report given to Angela HARVEY on Ama Chu  [facility] for routine progression of patient care       Report consisted of patient’s Situation, Background, Assessment and   Recommendations(SBAR).     Information from the following report(s) Nurse Handoff Report, Adult Overview, Surgery Report, Intake/Output, MAR, Recent Results, Med Rec Status, Cardiac Rhythm Sinus, Pre Procedure Checklist, Procedure Verification, Quality Measures, and Neuro Assessment was reviewed with the receiving nurse.    Lines:   Peripheral IV 06/09/24 Left Antecubital (Active)   Site Assessment Clean, dry & intact 06/11/24 1250   Line Status Normal saline locked 06/11/24 1250   Line Care Connections checked and tightened 06/11/24 1250   Phlebitis Assessment No symptoms 06/11/24 1250   Infiltration Assessment 0 06/11/24 1250   Alcohol Cap Used Yes 06/11/24 0354   Dressing Status Clean, dry & intact 06/11/24 1250   Dressing Type Transparent 06/11/24 1250        Opportunity for questions and clarification was provided.      Patient transported with:   O2 @ 2 liters  Tech    VTE prophylaxis orders have been written for Ama Chu.

## 2024-06-11 NOTE — PERIOP NOTE
TRANSFER - IN REPORT:    Verbal report received from CANDICE Downs on Ama Chu  being received from [facility] for routine progression of patient care      Report consisted of patient's Situation, Background, Assessment and   Recommendations(SBAR).     Information from the following report(s) Nurse Handoff Report, MAR, and Recent Results was reviewed with the receiving nurse.    Opportunity for questions and clarification was provided.      Assessment completed upon patient's arrival to unit and care assumed.

## 2024-06-12 LAB
ANION GAP SERPL CALC-SCNC: 6 MMOL/L (ref 9–18)
BUN SERPL-MCNC: 12 MG/DL (ref 8–23)
CALCIUM SERPL-MCNC: 8.2 MG/DL (ref 8.8–10.2)
CHLORIDE SERPL-SCNC: 105 MMOL/L (ref 98–107)
CO2 SERPL-SCNC: 31 MMOL/L (ref 20–28)
CREAT SERPL-MCNC: 0.67 MG/DL (ref 0.6–1.1)
ERYTHROCYTE [DISTWIDTH] IN BLOOD BY AUTOMATED COUNT: 15.9 % (ref 11.9–14.6)
GLUCOSE SERPL-MCNC: 124 MG/DL (ref 70–99)
HCT VFR BLD AUTO: 27.3 % (ref 35.8–46.3)
HGB BLD-MCNC: 7.9 G/DL (ref 11.7–15.4)
MCH RBC QN AUTO: 28.4 PG (ref 26.1–32.9)
MCHC RBC AUTO-ENTMCNC: 28.9 G/DL (ref 31.4–35)
MCV RBC AUTO: 98.2 FL (ref 82–102)
NRBC # BLD: 0 K/UL (ref 0–0.2)
PLATELET # BLD AUTO: 305 K/UL (ref 150–450)
PMV BLD AUTO: 10.1 FL (ref 9.4–12.3)
POTASSIUM SERPL-SCNC: 4.2 MMOL/L (ref 3.5–5.1)
RBC # BLD AUTO: 2.78 M/UL (ref 4.05–5.2)
SODIUM SERPL-SCNC: 142 MMOL/L (ref 136–145)
VANCOMYCIN SERPL-MCNC: 25.4 UG/ML
WBC # BLD AUTO: 5 K/UL (ref 4.3–11.1)

## 2024-06-12 PROCEDURE — 85027 COMPLETE CBC AUTOMATED: CPT

## 2024-06-12 PROCEDURE — 6370000000 HC RX 637 (ALT 250 FOR IP): Performed by: PHYSICIAN ASSISTANT

## 2024-06-12 PROCEDURE — 76937 US GUIDE VASCULAR ACCESS: CPT

## 2024-06-12 PROCEDURE — 36415 COLL VENOUS BLD VENIPUNCTURE: CPT

## 2024-06-12 PROCEDURE — 6370000000 HC RX 637 (ALT 250 FOR IP): Performed by: STUDENT IN AN ORGANIZED HEALTH CARE EDUCATION/TRAINING PROGRAM

## 2024-06-12 PROCEDURE — 1100000000 HC RM PRIVATE

## 2024-06-12 PROCEDURE — 6360000002 HC RX W HCPCS: Performed by: ORTHOPAEDIC SURGERY

## 2024-06-12 PROCEDURE — 2580000003 HC RX 258: Performed by: STUDENT IN AN ORGANIZED HEALTH CARE EDUCATION/TRAINING PROGRAM

## 2024-06-12 PROCEDURE — 6370000000 HC RX 637 (ALT 250 FOR IP): Performed by: HOSPITALIST

## 2024-06-12 PROCEDURE — 80048 BASIC METABOLIC PNL TOTAL CA: CPT

## 2024-06-12 PROCEDURE — 97530 THERAPEUTIC ACTIVITIES: CPT

## 2024-06-12 PROCEDURE — 2580000003 HC RX 258: Performed by: ORTHOPAEDIC SURGERY

## 2024-06-12 PROCEDURE — 6360000002 HC RX W HCPCS: Performed by: HOSPITALIST

## 2024-06-12 PROCEDURE — 80202 ASSAY OF VANCOMYCIN: CPT

## 2024-06-12 PROCEDURE — 97605 NEG PRS WND THER DME<=50SQCM: CPT

## 2024-06-12 PROCEDURE — 2580000003 HC RX 258: Performed by: HOSPITALIST

## 2024-06-12 PROCEDURE — 6360000002 HC RX W HCPCS: Performed by: STUDENT IN AN ORGANIZED HEALTH CARE EDUCATION/TRAINING PROGRAM

## 2024-06-12 PROCEDURE — 97165 OT EVAL LOW COMPLEX 30 MIN: CPT

## 2024-06-12 PROCEDURE — 6370000000 HC RX 637 (ALT 250 FOR IP): Performed by: ORTHOPAEDIC SURGERY

## 2024-06-12 RX ADMIN — SODIUM CHLORIDE, PRESERVATIVE FREE 10 ML: 5 INJECTION INTRAVENOUS at 20:47

## 2024-06-12 RX ADMIN — CEFEPIME 2000 MG: 2 INJECTION, POWDER, FOR SOLUTION INTRAVENOUS at 18:02

## 2024-06-12 RX ADMIN — PREGABALIN 150 MG: 75 CAPSULE ORAL at 20:46

## 2024-06-12 RX ADMIN — HYDROXYCHLOROQUINE SULFATE 200 MG: 200 TABLET ORAL at 20:46

## 2024-06-12 RX ADMIN — HYDROMORPHONE HYDROCHLORIDE 0.5 MG: 1 INJECTION, SOLUTION INTRAMUSCULAR; INTRAVENOUS; SUBCUTANEOUS at 11:04

## 2024-06-12 RX ADMIN — ESCITALOPRAM OXALATE 10 MG: 10 TABLET ORAL at 08:39

## 2024-06-12 RX ADMIN — VANCOMYCIN HYDROCHLORIDE 1250 MG: 10 INJECTION, POWDER, LYOPHILIZED, FOR SOLUTION INTRAVENOUS at 08:35

## 2024-06-12 RX ADMIN — DOXEPIN HYDROCHLORIDE 150 MG: 50 CAPSULE ORAL at 20:45

## 2024-06-12 RX ADMIN — PANTOPRAZOLE SODIUM 40 MG: 40 TABLET, DELAYED RELEASE ORAL at 08:39

## 2024-06-12 RX ADMIN — TAMSULOSIN HYDROCHLORIDE 0.4 MG: 0.4 CAPSULE ORAL at 08:38

## 2024-06-12 RX ADMIN — ASPIRIN 325 MG: 325 TABLET, COATED ORAL at 08:38

## 2024-06-12 RX ADMIN — Medication 1 EACH: at 11:25

## 2024-06-12 RX ADMIN — PREGABALIN 150 MG: 75 CAPSULE ORAL at 08:39

## 2024-06-12 RX ADMIN — HYDROXYCHLOROQUINE SULFATE 200 MG: 200 TABLET ORAL at 08:39

## 2024-06-12 RX ADMIN — HYDROCODONE BITARTRATE AND ACETAMINOPHEN 1 TABLET: 5; 325 TABLET ORAL at 18:10

## 2024-06-12 RX ADMIN — SODIUM CHLORIDE, PRESERVATIVE FREE 10 ML: 5 INJECTION INTRAVENOUS at 20:46

## 2024-06-12 RX ADMIN — PREDNISONE 10 MG: 5 TABLET ORAL at 08:38

## 2024-06-12 RX ADMIN — TRAZODONE HYDROCHLORIDE 300 MG: 50 TABLET ORAL at 20:46

## 2024-06-12 RX ADMIN — VANCOMYCIN HYDROCHLORIDE 1250 MG: 10 INJECTION, POWDER, LYOPHILIZED, FOR SOLUTION INTRAVENOUS at 20:43

## 2024-06-12 RX ADMIN — HYDROCODONE BITARTRATE AND ACETAMINOPHEN 1 TABLET: 5; 325 TABLET ORAL at 09:32

## 2024-06-12 RX ADMIN — POLYETHYLENE GLYCOL 3350 17 G: 17 POWDER, FOR SOLUTION ORAL at 08:38

## 2024-06-12 RX ADMIN — CEFEPIME 2000 MG: 2 INJECTION, POWDER, FOR SOLUTION INTRAVENOUS at 02:03

## 2024-06-12 RX ADMIN — DOCUSATE SODIUM 50 MG AND SENNOSIDES 8.6 MG 2 TABLET: 8.6; 5 TABLET, FILM COATED ORAL at 08:38

## 2024-06-12 RX ADMIN — CEFEPIME 2000 MG: 2 INJECTION, POWDER, FOR SOLUTION INTRAVENOUS at 11:00

## 2024-06-12 RX ADMIN — TORSEMIDE 50 MG: 100 TABLET ORAL at 08:39

## 2024-06-12 RX ADMIN — ALLOPURINOL 100 MG: 100 TABLET ORAL at 08:39

## 2024-06-12 RX ADMIN — LORAZEPAM 2 MG: 1 TABLET ORAL at 20:46

## 2024-06-12 ASSESSMENT — PAIN DESCRIPTION - ORIENTATION
ORIENTATION: RIGHT

## 2024-06-12 ASSESSMENT — PAIN SCALES - GENERAL
PAINLEVEL_OUTOF10: 10
PAINLEVEL_OUTOF10: 6
PAINLEVEL_OUTOF10: 6
PAINLEVEL_OUTOF10: 1

## 2024-06-12 ASSESSMENT — PAIN DESCRIPTION - LOCATION
LOCATION: LEG

## 2024-06-12 ASSESSMENT — PAIN DESCRIPTION - DESCRIPTORS
DESCRIPTORS: STABBING
DESCRIPTORS: SHARP
DESCRIPTORS: SHARP

## 2024-06-12 NOTE — CARE COORDINATION
Chart reviewed and patient discussed in IDT rounds this AM. Patient has I&D yesterday and needing a wound vac. Therapy consulted to evaluate. CM sent wound vac order to [facility] for home wound vac to be processed in the event patient will discharged home. CM following for discharge planning.     Cosmo STONE, ACM  [place]

## 2024-06-12 NOTE — WOUND CARE
Patient had been premedicated with oral pain meds per nurse. While removing wound vac drape patient complained of a great deal of pain.  Nurse gave IV pain meds.  Dressing removed and scant amount of bleeding at wound edge. Pressure held for 10 minutes then call to [facility] order received to use silver nitrate to this area.  Bleeding stopped. Wound VAC applied seal achieved and machine working well.  Wound does have tunnel at 12' of 6cm  and 8 o'clock of 9cm.  Papers for home machine given to . Will monitor.

## 2024-06-13 LAB
ACID FAST STN SPEC: NEGATIVE
ANION GAP SERPL CALC-SCNC: 6 MMOL/L (ref 9–18)
BUN SERPL-MCNC: 14 MG/DL (ref 8–23)
CALCIUM SERPL-MCNC: 8.6 MG/DL (ref 8.8–10.2)
CHLORIDE SERPL-SCNC: 100 MMOL/L (ref 98–107)
CO2 SERPL-SCNC: 35 MMOL/L (ref 20–28)
CREAT SERPL-MCNC: 0.66 MG/DL (ref 0.6–1.1)
ERYTHROCYTE [DISTWIDTH] IN BLOOD BY AUTOMATED COUNT: 16.3 % (ref 11.9–14.6)
FUNGAL CULT/SMEAR: NORMAL
GLUCOSE SERPL-MCNC: 88 MG/DL (ref 70–99)
HCT VFR BLD AUTO: 29.9 % (ref 35.8–46.3)
HGB BLD-MCNC: 8.5 G/DL (ref 11.7–15.4)
MCH RBC QN AUTO: 28.3 PG (ref 26.1–32.9)
MCHC RBC AUTO-ENTMCNC: 28.4 G/DL (ref 31.4–35)
MCV RBC AUTO: 99.7 FL (ref 82–102)
MYCOBACTERIUM SPEC QL CULT: NORMAL
NRBC # BLD: 0 K/UL (ref 0–0.2)
PLATELET # BLD AUTO: 284 K/UL (ref 150–450)
PMV BLD AUTO: 10.2 FL (ref 9.4–12.3)
POTASSIUM SERPL-SCNC: 3.6 MMOL/L (ref 3.5–5.1)
RBC # BLD AUTO: 3 M/UL (ref 4.05–5.2)
SODIUM SERPL-SCNC: 141 MMOL/L (ref 136–145)
SPECIMEN PREPARATION: NORMAL
SPECIMEN PROCESSING: NORMAL
SPECIMEN SOURCE: NORMAL
SPECIMEN SOURCE: NORMAL
WBC # BLD AUTO: 6.2 K/UL (ref 4.3–11.1)

## 2024-06-13 PROCEDURE — 97530 THERAPEUTIC ACTIVITIES: CPT

## 2024-06-13 PROCEDURE — 85027 COMPLETE CBC AUTOMATED: CPT

## 2024-06-13 PROCEDURE — 6370000000 HC RX 637 (ALT 250 FOR IP): Performed by: ORTHOPAEDIC SURGERY

## 2024-06-13 PROCEDURE — 2580000003 HC RX 258: Performed by: STUDENT IN AN ORGANIZED HEALTH CARE EDUCATION/TRAINING PROGRAM

## 2024-06-13 PROCEDURE — 2580000003 HC RX 258: Performed by: HOSPITALIST

## 2024-06-13 PROCEDURE — 6360000002 HC RX W HCPCS: Performed by: STUDENT IN AN ORGANIZED HEALTH CARE EDUCATION/TRAINING PROGRAM

## 2024-06-13 PROCEDURE — 6370000000 HC RX 637 (ALT 250 FOR IP): Performed by: HOSPITALIST

## 2024-06-13 PROCEDURE — 1100000000 HC RM PRIVATE

## 2024-06-13 PROCEDURE — 97168 OT RE-EVAL EST PLAN CARE: CPT

## 2024-06-13 PROCEDURE — 6360000002 HC RX W HCPCS: Performed by: HOSPITALIST

## 2024-06-13 PROCEDURE — 2580000003 HC RX 258: Performed by: ORTHOPAEDIC SURGERY

## 2024-06-13 PROCEDURE — 97162 PT EVAL MOD COMPLEX 30 MIN: CPT

## 2024-06-13 PROCEDURE — 97112 NEUROMUSCULAR REEDUCATION: CPT

## 2024-06-13 PROCEDURE — 80048 BASIC METABOLIC PNL TOTAL CA: CPT

## 2024-06-13 PROCEDURE — 36415 COLL VENOUS BLD VENIPUNCTURE: CPT

## 2024-06-13 PROCEDURE — 6370000000 HC RX 637 (ALT 250 FOR IP): Performed by: INTERNAL MEDICINE

## 2024-06-13 PROCEDURE — 6370000000 HC RX 637 (ALT 250 FOR IP): Performed by: STUDENT IN AN ORGANIZED HEALTH CARE EDUCATION/TRAINING PROGRAM

## 2024-06-13 RX ORDER — CEPHALEXIN 250 MG/1
1000 CAPSULE ORAL EVERY 6 HOURS SCHEDULED
Status: DISCONTINUED | OUTPATIENT
Start: 2024-06-13 | End: 2024-06-14

## 2024-06-13 RX ORDER — CEPHALEXIN 250 MG/1
500 CAPSULE ORAL EVERY 6 HOURS SCHEDULED
Status: DISCONTINUED | OUTPATIENT
Start: 2024-06-13 | End: 2024-06-13

## 2024-06-13 RX ORDER — DOXYCYCLINE HYCLATE 100 MG/1
100 CAPSULE ORAL EVERY 12 HOURS SCHEDULED
Status: DISCONTINUED | OUTPATIENT
Start: 2024-06-13 | End: 2024-06-19 | Stop reason: HOSPADM

## 2024-06-13 RX ADMIN — TAMSULOSIN HYDROCHLORIDE 0.4 MG: 0.4 CAPSULE ORAL at 09:02

## 2024-06-13 RX ADMIN — HYDROCODONE BITARTRATE AND ACETAMINOPHEN 1 TABLET: 5; 325 TABLET ORAL at 07:38

## 2024-06-13 RX ADMIN — ALLOPURINOL 100 MG: 100 TABLET ORAL at 09:01

## 2024-06-13 RX ADMIN — ESCITALOPRAM OXALATE 10 MG: 10 TABLET ORAL at 09:01

## 2024-06-13 RX ADMIN — CEFEPIME 2000 MG: 2 INJECTION, POWDER, FOR SOLUTION INTRAVENOUS at 02:30

## 2024-06-13 RX ADMIN — POLYETHYLENE GLYCOL 3350 17 G: 17 POWDER, FOR SOLUTION ORAL at 09:02

## 2024-06-13 RX ADMIN — TORSEMIDE 50 MG: 100 TABLET ORAL at 09:01

## 2024-06-13 RX ADMIN — PREDNISONE 10 MG: 5 TABLET ORAL at 09:01

## 2024-06-13 RX ADMIN — HYDROXYCHLOROQUINE SULFATE 200 MG: 200 TABLET ORAL at 09:02

## 2024-06-13 RX ADMIN — DOXEPIN HYDROCHLORIDE 150 MG: 50 CAPSULE ORAL at 21:39

## 2024-06-13 RX ADMIN — SODIUM CHLORIDE, PRESERVATIVE FREE 10 ML: 5 INJECTION INTRAVENOUS at 21:40

## 2024-06-13 RX ADMIN — CEPHALEXIN 1000 MG: 250 CAPSULE ORAL at 23:30

## 2024-06-13 RX ADMIN — CEPHALEXIN 1000 MG: 250 CAPSULE ORAL at 11:45

## 2024-06-13 RX ADMIN — DOCUSATE SODIUM 50 MG AND SENNOSIDES 8.6 MG 2 TABLET: 8.6; 5 TABLET, FILM COATED ORAL at 09:01

## 2024-06-13 RX ADMIN — DOXYCYCLINE HYCLATE 100 MG: 100 CAPSULE ORAL at 21:39

## 2024-06-13 RX ADMIN — PREGABALIN 150 MG: 75 CAPSULE ORAL at 09:02

## 2024-06-13 RX ADMIN — PANTOPRAZOLE SODIUM 40 MG: 40 TABLET, DELAYED RELEASE ORAL at 09:01

## 2024-06-13 RX ADMIN — ASPIRIN 325 MG: 325 TABLET, COATED ORAL at 09:02

## 2024-06-13 RX ADMIN — VANCOMYCIN HYDROCHLORIDE 1250 MG: 10 INJECTION, POWDER, LYOPHILIZED, FOR SOLUTION INTRAVENOUS at 09:10

## 2024-06-13 RX ADMIN — LORAZEPAM 2 MG: 1 TABLET ORAL at 21:39

## 2024-06-13 RX ADMIN — TRAZODONE HYDROCHLORIDE 300 MG: 50 TABLET ORAL at 21:39

## 2024-06-13 RX ADMIN — DOXYCYCLINE HYCLATE 100 MG: 100 CAPSULE ORAL at 10:30

## 2024-06-13 RX ADMIN — PREGABALIN 150 MG: 75 CAPSULE ORAL at 21:39

## 2024-06-13 RX ADMIN — CEPHALEXIN 1000 MG: 250 CAPSULE ORAL at 18:18

## 2024-06-13 RX ADMIN — HYDROXYCHLOROQUINE SULFATE 200 MG: 200 TABLET ORAL at 21:39

## 2024-06-13 ASSESSMENT — PAIN SCALES - GENERAL: PAINLEVEL_OUTOF10: 7

## 2024-06-13 ASSESSMENT — PAIN DESCRIPTION - LOCATION: LOCATION: LEG

## 2024-06-13 ASSESSMENT — PAIN DESCRIPTION - ORIENTATION: ORIENTATION: RIGHT

## 2024-06-13 ASSESSMENT — PAIN DESCRIPTION - DESCRIPTORS: DESCRIPTORS: SHARP

## 2024-06-13 NOTE — CARE COORDINATION
CM discussed short term rehab with patient and daughter at bedside. Daughter would like a referral be sent to [facility]. CM discussed patient might not qualify for that level of care and asked for snf facilities. Daughter and patient selected [facility], and [facility]. Referrals sent.     Cosmo ORTIZ, MIROSLAVA Howell

## 2024-06-13 NOTE — CONSULTS
[facility]  Consultation Note    Patient ID:  Name: Ama Chu  MRN: [id number]  AGE: 74 y.o.  : 1950    Date of Consultation:  Sylvie 10, 2024  Referring Physician:  Hospitalist     Subjective: Pt complains of right medial thigh pain that has been going on for about 5 weeks.  They presented to the [facility] yesterday. They were found to have a large abscess on CT. They were admitted by the hospitalist. They localizes their pain to the right medial thigh. This is tender. They have no other orthopedic concerns at this time.      Past Medical History Includes: No past medical history on file.,   Past Surgical History:   Procedure Laterality Date    LEG SURGERY Left 2024    LEFT FEMUR IM NAIL RUFUS INSERTION RETROGRADE performed by Carter Braga MD at [facility] MAIN OR    LEG SURGERY Left 3/23/2024    LEFT LEG INCISION AND DRAINAGE OF LEFT THIGH ABSCESS, WITH PLACEMENT OF WOUND VAC performed by Balaji Gutierrez MD at [facility] MAIN OR    UPPER GASTROINTESTINAL ENDOSCOPY N/A 2024    EGD ESOPHAGOGASTRODUODENOSCOPY performed by Sri Shaw MD at [facility] ENDOSCOPY     Family History: No family history on file.   Social History:   Social History     Tobacco Use    Smoking status: Former     Types: Cigarettes    Smokeless tobacco: Never   Substance Use Topics    Alcohol use: Not on file       ALLERGIES: No Known Allergies     Patient Medications    Current Facility-Administered Medications   Medication Dose Route Frequency    ipratropium 0.5 mg-albuterol 2.5 mg (DUONEB) nebulizer solution 1 Dose  1 Dose Inhalation Q4H PRN    sodium chloride flush 0.9 % injection 5-40 mL  5-40 mL IntraVENous 2 times per day    sodium chloride flush 0.9 % injection 5-40 mL  5-40 mL IntraVENous PRN    0.9 % sodium chloride infusion   IntraVENous PRN    potassium chloride (KLOR-CON M) extended release tablet 40 mEq  40 mEq Oral PRN    Or    potassium bicarb-citric acid (EFFER-K) effervescent tablet 40 mEq  40 
   Consult    Patient: Ama Chu MRN: [id number]  SSN: [id number]    YOB: 1950  Age: 74 y.o.  Sex: female      Subjective:      Ama Chu is a 74 y.o. female  admitted with pain and swelling right medial distal thigh  Complaints of several days history of increasing pain .and swelling and eventually drainage. CT shows drainable abscess    History right tibial rufus.  Abscess left latera thigh 3 months post retrograde rufus femur sucessfuklly treated with I and D and wound vac    No past medical history on file.  Past Surgical History:   Procedure Laterality Date    LEG SURGERY Left 1/24/2024    LEFT FEMUR IM NAIL RUFUS INSERTION RETROGRADE performed by Carter Braga MD at [facility] MAIN OR    LEG SURGERY Left 3/23/2024    LEFT LEG INCISION AND DRAINAGE OF LEFT THIGH ABSCESS, WITH PLACEMENT OF WOUND VAC performed by Balaji Gutierrez MD at [facility] MAIN OR    UPPER GASTROINTESTINAL ENDOSCOPY N/A 1/27/2024    EGD ESOPHAGOGASTRODUODENOSCOPY performed by Sri Shaw MD at [facility] ENDOSCOPY      FAMHX -No history of inflammatory arthritis   Social History     Tobacco Use    Smoking status: Former     Types: Cigarettes    Smokeless tobacco: Never   Substance Use Topics    Alcohol use: Not on file      Current Facility-Administered Medications   Medication Dose Route Frequency Provider Last Rate Last Admin    ipratropium 0.5 mg-albuterol 2.5 mg (DUONEB) nebulizer solution 1 Dose  1 Dose Inhalation Q4H PRN Leigh Ann Ruelas MD        sodium chloride flush 0.9 % injection 5-40 mL  5-40 mL IntraVENous 2 times per day Joe Long MD   10 mL at 06/10/24 0918    sodium chloride flush 0.9 % injection 5-40 mL  5-40 mL IntraVENous PRN Joe Long MD        0.9 % sodium chloride infusion   IntraVENous PRN Joe Long MD 5 mL/hr at 06/10/24 0218 New Bag at 06/10/24 0218    potassium chloride (KLOR-CON M) extended release tablet 40 mEq  40 mEq Oral PRN Joe Long MD        Or    potassium bicarb-citric acid 
Patient chart reviewed.  Recently admitted for I&D abscess with orthopedics.  PT was consulted.  At baseline she utilizes sliding board to get from bed to manual wheelchair.  She utilizes bedpan instead of bedside commode, previously had a caregiver, no longer has assistance in the home setting.  She has maximal assistance x 2 for bed mobility, nonambulatory at baseline. Denied for inpatient rehab services at this time, currently does not meet diagnostic admission criteria for [facility] level setting. Furthermore, patient without significant underlying medical problems, and is currently low risk for decompensation and therefore does not require continued close physician monitoring or 24-hour rehabilitation nursing care. Current functional and medical status can be treated and monitored at a home versus lower level care facility. Patient does not require acute rehabilitation setting at this time. Patient is currently too low level to be able to participate in 3-4 hours of therapy a day. Patient would benefit from a less intense therapy schedule over a prolonged duration of time in a setting such as a SNF.  Thank you for this consult, PM&R will sign off at this time  
1 tablet by mouth daily for 17 days 3/4/24 3/21/24  Yuliet Rocha MD   nicotine (NICODERM CQ) 14 MG/24HR Place 1 patch onto the skin daily as needed (smoking cravings)  Patient not taking: Reported on 6/9/2024 3/3/24   Yuliet Rocha MD   polyethylene glycol (MIRALAX) 17 g PACK packet Take 17 g by mouth daily    Anita Vee MD   sennosides-docusate sodium (SENOKOT-S) 8.6-50 MG tablet Take 2 tablets by mouth daily    Anita Vee MD   pantoprazole (PROTONIX) 40 MG tablet Take 1 tablet by mouth daily 1/30/24   Felicita Munoz MD   doxepin (SINEQUAN) 100 MG capsule Take 150 mg by mouth nightly    Anita Vee MD   ergocalciferol (ERGOCALCIFEROL) 1.25 MG (91435 UT) capsule Take 1 capsule by mouth once a week    Anita Vee MD   escitalopram (LEXAPRO) 10 MG tablet Take 1 tablet by mouth daily    Anita Vee MD   ferrous sulfate (IRON 325) 325 (65 Fe) MG tablet Take 1 tablet by mouth daily (with breakfast)    Anita Vee MD   hydroxychloroquine (PLAQUENIL) 200 MG tablet Take 1 tablet by mouth 2 times daily    Anita Vee MD   metoprolol succinate (TOPROL XL) 50 MG extended release tablet Take 1 tablet by mouth daily  Patient not taking: Reported on 6/10/2024    Anita Vee MD   tamsulosin (FLOMAX) 0.4 MG capsule Take 1 capsule by mouth daily    Anita Vee MD   traZODone (DESYREL) 150 MG tablet Take 2 tablets by mouth nightly    Anita Vee MD   pregabalin (LYRICA) 75 MG capsule Take 1 capsule by mouth 2 times daily.    Anita Vee MD   LORazepam (ATIVAN) 2 MG tablet Take 1 tablet by mouth nightly.    Anita Vee MD   folic acid (FOLVITE) 1 MG tablet Take 1 tablet by mouth daily    Anita Vee MD   melatonin 3 MG TABS tablet Take 1 tablet by mouth nightly    Anita Vee MD     No Known Allergies     Review of Systems:  All systems reviewed and negative except as

## 2024-06-14 LAB
ANION GAP SERPL CALC-SCNC: 8 MMOL/L (ref 9–18)
BACTERIA SPEC CULT: ABNORMAL
BACTERIA SPEC CULT: NORMAL
BACTERIA SPEC CULT: NORMAL
BUN SERPL-MCNC: 22 MG/DL (ref 8–23)
CALCIUM SERPL-MCNC: 9 MG/DL (ref 8.8–10.2)
CHLORIDE SERPL-SCNC: 99 MMOL/L (ref 98–107)
CO2 SERPL-SCNC: 38 MMOL/L (ref 20–28)
CREAT SERPL-MCNC: 0.7 MG/DL (ref 0.6–1.1)
ERYTHROCYTE [DISTWIDTH] IN BLOOD BY AUTOMATED COUNT: 16.4 % (ref 11.9–14.6)
GLUCOSE SERPL-MCNC: 95 MG/DL (ref 70–99)
GRAM STN SPEC: ABNORMAL
GRAM STN SPEC: ABNORMAL
HCT VFR BLD AUTO: 30.2 % (ref 35.8–46.3)
HGB BLD-MCNC: 8.8 G/DL (ref 11.7–15.4)
MAGNESIUM SERPL-MCNC: 1.5 MG/DL (ref 1.8–2.4)
MCH RBC QN AUTO: 28.2 PG (ref 26.1–32.9)
MCHC RBC AUTO-ENTMCNC: 29.1 G/DL (ref 31.4–35)
MCV RBC AUTO: 96.8 FL (ref 82–102)
NRBC # BLD: 0 K/UL (ref 0–0.2)
PLATELET # BLD AUTO: 315 K/UL (ref 150–450)
PMV BLD AUTO: 9.8 FL (ref 9.4–12.3)
POTASSIUM SERPL-SCNC: 3.5 MMOL/L (ref 3.5–5.1)
RBC # BLD AUTO: 3.12 M/UL (ref 4.05–5.2)
SERVICE CMNT-IMP: ABNORMAL
SERVICE CMNT-IMP: NORMAL
SERVICE CMNT-IMP: NORMAL
SODIUM SERPL-SCNC: 144 MMOL/L (ref 136–145)
WBC # BLD AUTO: 8.5 K/UL (ref 4.3–11.1)

## 2024-06-14 PROCEDURE — 6370000000 HC RX 637 (ALT 250 FOR IP): Performed by: STUDENT IN AN ORGANIZED HEALTH CARE EDUCATION/TRAINING PROGRAM

## 2024-06-14 PROCEDURE — 85027 COMPLETE CBC AUTOMATED: CPT

## 2024-06-14 PROCEDURE — 83735 ASSAY OF MAGNESIUM: CPT

## 2024-06-14 PROCEDURE — 2580000003 HC RX 258: Performed by: HOSPITALIST

## 2024-06-14 PROCEDURE — 80048 BASIC METABOLIC PNL TOTAL CA: CPT

## 2024-06-14 PROCEDURE — 36415 COLL VENOUS BLD VENIPUNCTURE: CPT

## 2024-06-14 PROCEDURE — 2580000003 HC RX 258: Performed by: ORTHOPAEDIC SURGERY

## 2024-06-14 PROCEDURE — 6370000000 HC RX 637 (ALT 250 FOR IP): Performed by: HOSPITALIST

## 2024-06-14 PROCEDURE — 97605 NEG PRS WND THER DME<=50SQCM: CPT

## 2024-06-14 PROCEDURE — 6370000000 HC RX 637 (ALT 250 FOR IP): Performed by: INTERNAL MEDICINE

## 2024-06-14 PROCEDURE — 1100000000 HC RM PRIVATE

## 2024-06-14 PROCEDURE — 6370000000 HC RX 637 (ALT 250 FOR IP): Performed by: ORTHOPAEDIC SURGERY

## 2024-06-14 RX ADMIN — LORAZEPAM 2 MG: 1 TABLET ORAL at 21:00

## 2024-06-14 RX ADMIN — SODIUM CHLORIDE, PRESERVATIVE FREE 10 ML: 5 INJECTION INTRAVENOUS at 21:07

## 2024-06-14 RX ADMIN — CEPHALEXIN 1000 MG: 250 CAPSULE ORAL at 05:19

## 2024-06-14 RX ADMIN — DOCUSATE SODIUM 50 MG AND SENNOSIDES 8.6 MG 2 TABLET: 8.6; 5 TABLET, FILM COATED ORAL at 08:46

## 2024-06-14 RX ADMIN — PREGABALIN 150 MG: 75 CAPSULE ORAL at 08:47

## 2024-06-14 RX ADMIN — HYDROXYCHLOROQUINE SULFATE 200 MG: 200 TABLET ORAL at 08:46

## 2024-06-14 RX ADMIN — HYDROCODONE BITARTRATE AND ACETAMINOPHEN 1 TABLET: 5; 325 TABLET ORAL at 12:14

## 2024-06-14 RX ADMIN — DOXYCYCLINE HYCLATE 100 MG: 100 CAPSULE ORAL at 21:00

## 2024-06-14 RX ADMIN — DOXEPIN HYDROCHLORIDE 150 MG: 50 CAPSULE ORAL at 21:00

## 2024-06-14 RX ADMIN — SODIUM CHLORIDE, PRESERVATIVE FREE 10 ML: 5 INJECTION INTRAVENOUS at 08:48

## 2024-06-14 RX ADMIN — HYDROXYCHLOROQUINE SULFATE 200 MG: 200 TABLET ORAL at 21:00

## 2024-06-14 RX ADMIN — PANTOPRAZOLE SODIUM 40 MG: 40 TABLET, DELAYED RELEASE ORAL at 08:46

## 2024-06-14 RX ADMIN — ALLOPURINOL 100 MG: 100 TABLET ORAL at 08:46

## 2024-06-14 RX ADMIN — TRAZODONE HYDROCHLORIDE 300 MG: 50 TABLET ORAL at 20:59

## 2024-06-14 RX ADMIN — PREDNISONE 10 MG: 5 TABLET ORAL at 08:46

## 2024-06-14 RX ADMIN — DOXYCYCLINE HYCLATE 100 MG: 100 CAPSULE ORAL at 08:47

## 2024-06-14 RX ADMIN — TAMSULOSIN HYDROCHLORIDE 0.4 MG: 0.4 CAPSULE ORAL at 08:46

## 2024-06-14 RX ADMIN — POLYETHYLENE GLYCOL 3350 17 G: 17 POWDER, FOR SOLUTION ORAL at 21:07

## 2024-06-14 RX ADMIN — ASPIRIN 325 MG: 325 TABLET, COATED ORAL at 08:46

## 2024-06-14 RX ADMIN — PREGABALIN 150 MG: 75 CAPSULE ORAL at 21:00

## 2024-06-14 RX ADMIN — TORSEMIDE 50 MG: 100 TABLET ORAL at 08:47

## 2024-06-14 RX ADMIN — ESCITALOPRAM OXALATE 10 MG: 10 TABLET ORAL at 08:47

## 2024-06-14 ASSESSMENT — PAIN SCALES - GENERAL
PAINLEVEL_OUTOF10: 6
PAINLEVEL_OUTOF10: 1

## 2024-06-14 ASSESSMENT — PAIN DESCRIPTION - LOCATION: LOCATION: KNEE

## 2024-06-14 ASSESSMENT — PAIN DESCRIPTION - PAIN TYPE: TYPE: ACUTE PAIN

## 2024-06-14 ASSESSMENT — PAIN DESCRIPTION - DESCRIPTORS: DESCRIPTORS: DISCOMFORT

## 2024-06-14 ASSESSMENT — PAIN - FUNCTIONAL ASSESSMENT: PAIN_FUNCTIONAL_ASSESSMENT: ACTIVITIES ARE NOT PREVENTED

## 2024-06-14 ASSESSMENT — PAIN DESCRIPTION - ONSET: ONSET: ON-GOING

## 2024-06-14 ASSESSMENT — PAIN DESCRIPTION - FREQUENCY: FREQUENCY: CONTINUOUS

## 2024-06-14 ASSESSMENT — PAIN DESCRIPTION - ORIENTATION: ORIENTATION: LEFT;RIGHT

## 2024-06-14 NOTE — CARE COORDINATION
CM discussed with patient and both daughters that has been declined for 9th floor, [facility], [facility] and [facility]. Migdalia asked about [facility]. CM explained [facility] is the same level of care as [facility] and [facility] and patient is not appropriate. CM gave another short term rehab list and asked for additional facilities for referrals.    Cosmo STONE, ACM  [place]

## 2024-06-14 NOTE — WOUND CARE
Right thigh wound vac dressing changed, wound granular, tunnel towards anterior thigh is closing, 7cm wound is 3x5x2 cm  seal achieved and machine working well. Will monitor.

## 2024-06-15 LAB
ANION GAP SERPL CALC-SCNC: 11 MMOL/L (ref 9–18)
BUN SERPL-MCNC: 24 MG/DL (ref 8–23)
CALCIUM SERPL-MCNC: 8.9 MG/DL (ref 8.8–10.2)
CHLORIDE SERPL-SCNC: 94 MMOL/L (ref 98–107)
CO2 SERPL-SCNC: 35 MMOL/L (ref 20–28)
CREAT SERPL-MCNC: 0.74 MG/DL (ref 0.6–1.1)
ERYTHROCYTE [DISTWIDTH] IN BLOOD BY AUTOMATED COUNT: 16.7 % (ref 11.9–14.6)
GLUCOSE SERPL-MCNC: 81 MG/DL (ref 70–99)
HCT VFR BLD AUTO: 31 % (ref 35.8–46.3)
HGB BLD-MCNC: 9 G/DL (ref 11.7–15.4)
MAGNESIUM SERPL-MCNC: 1.4 MG/DL (ref 1.8–2.4)
MCH RBC QN AUTO: 27.9 PG (ref 26.1–32.9)
MCHC RBC AUTO-ENTMCNC: 29 G/DL (ref 31.4–35)
MCV RBC AUTO: 96 FL (ref 82–102)
NRBC # BLD: 0 K/UL (ref 0–0.2)
PLATELET # BLD AUTO: 312 K/UL (ref 150–450)
PMV BLD AUTO: 10.2 FL (ref 9.4–12.3)
POTASSIUM SERPL-SCNC: 3.5 MMOL/L (ref 3.5–5.1)
RBC # BLD AUTO: 3.23 M/UL (ref 4.05–5.2)
SODIUM SERPL-SCNC: 141 MMOL/L (ref 136–145)
WBC # BLD AUTO: 8.5 K/UL (ref 4.3–11.1)

## 2024-06-15 PROCEDURE — 1100000000 HC RM PRIVATE

## 2024-06-15 PROCEDURE — 85027 COMPLETE CBC AUTOMATED: CPT

## 2024-06-15 PROCEDURE — 6370000000 HC RX 637 (ALT 250 FOR IP): Performed by: STUDENT IN AN ORGANIZED HEALTH CARE EDUCATION/TRAINING PROGRAM

## 2024-06-15 PROCEDURE — 6370000000 HC RX 637 (ALT 250 FOR IP): Performed by: HOSPITALIST

## 2024-06-15 PROCEDURE — 2580000003 HC RX 258: Performed by: HOSPITALIST

## 2024-06-15 PROCEDURE — 83735 ASSAY OF MAGNESIUM: CPT

## 2024-06-15 PROCEDURE — 80048 BASIC METABOLIC PNL TOTAL CA: CPT

## 2024-06-15 PROCEDURE — 6370000000 HC RX 637 (ALT 250 FOR IP): Performed by: ORTHOPAEDIC SURGERY

## 2024-06-15 PROCEDURE — 36415 COLL VENOUS BLD VENIPUNCTURE: CPT

## 2024-06-15 PROCEDURE — 2580000003 HC RX 258: Performed by: ORTHOPAEDIC SURGERY

## 2024-06-15 RX ORDER — LANOLIN ALCOHOL/MO/W.PET/CERES
400 CREAM (GRAM) TOPICAL DAILY
Status: DISCONTINUED | OUTPATIENT
Start: 2024-06-15 | End: 2024-06-19 | Stop reason: HOSPADM

## 2024-06-15 RX ADMIN — PREDNISONE 10 MG: 5 TABLET ORAL at 09:05

## 2024-06-15 RX ADMIN — ALLOPURINOL 100 MG: 100 TABLET ORAL at 09:05

## 2024-06-15 RX ADMIN — PANTOPRAZOLE SODIUM 40 MG: 40 TABLET, DELAYED RELEASE ORAL at 09:06

## 2024-06-15 RX ADMIN — TRAZODONE HYDROCHLORIDE 300 MG: 50 TABLET ORAL at 20:31

## 2024-06-15 RX ADMIN — HYDROXYCHLOROQUINE SULFATE 200 MG: 200 TABLET ORAL at 20:31

## 2024-06-15 RX ADMIN — SODIUM CHLORIDE, PRESERVATIVE FREE 10 ML: 5 INJECTION INTRAVENOUS at 09:07

## 2024-06-15 RX ADMIN — DOCUSATE SODIUM 50 MG AND SENNOSIDES 8.6 MG 2 TABLET: 8.6; 5 TABLET, FILM COATED ORAL at 09:05

## 2024-06-15 RX ADMIN — SODIUM CHLORIDE, PRESERVATIVE FREE 10 ML: 5 INJECTION INTRAVENOUS at 09:06

## 2024-06-15 RX ADMIN — DOXEPIN HYDROCHLORIDE 150 MG: 50 CAPSULE ORAL at 20:30

## 2024-06-15 RX ADMIN — TAMSULOSIN HYDROCHLORIDE 0.4 MG: 0.4 CAPSULE ORAL at 09:06

## 2024-06-15 RX ADMIN — HYDROCODONE BITARTRATE AND ACETAMINOPHEN 1 TABLET: 5; 325 TABLET ORAL at 10:56

## 2024-06-15 RX ADMIN — MAGNESIUM GLUCONATE 500 MG ORAL TABLET 400 MG: 500 TABLET ORAL at 09:05

## 2024-06-15 RX ADMIN — SODIUM CHLORIDE, PRESERVATIVE FREE 10 ML: 5 INJECTION INTRAVENOUS at 20:33

## 2024-06-15 RX ADMIN — DOXYCYCLINE HYCLATE 100 MG: 100 CAPSULE ORAL at 09:05

## 2024-06-15 RX ADMIN — LORAZEPAM 2 MG: 1 TABLET ORAL at 20:31

## 2024-06-15 RX ADMIN — HYDROCODONE BITARTRATE AND ACETAMINOPHEN 1 TABLET: 5; 325 TABLET ORAL at 06:36

## 2024-06-15 RX ADMIN — ASPIRIN 325 MG: 325 TABLET, COATED ORAL at 09:05

## 2024-06-15 RX ADMIN — HYDROXYCHLOROQUINE SULFATE 200 MG: 200 TABLET ORAL at 09:05

## 2024-06-15 RX ADMIN — TORSEMIDE 50 MG: 100 TABLET ORAL at 09:06

## 2024-06-15 RX ADMIN — PREGABALIN 150 MG: 75 CAPSULE ORAL at 09:05

## 2024-06-15 RX ADMIN — ESCITALOPRAM OXALATE 10 MG: 10 TABLET ORAL at 09:06

## 2024-06-15 RX ADMIN — DOXYCYCLINE HYCLATE 100 MG: 100 CAPSULE ORAL at 20:31

## 2024-06-15 RX ADMIN — PREGABALIN 150 MG: 75 CAPSULE ORAL at 20:31

## 2024-06-15 ASSESSMENT — PAIN DESCRIPTION - DESCRIPTORS
DESCRIPTORS: DISCOMFORT
DESCRIPTORS: SHARP

## 2024-06-15 ASSESSMENT — PAIN DESCRIPTION - ORIENTATION: ORIENTATION: RIGHT

## 2024-06-15 ASSESSMENT — PAIN SCALES - GENERAL
PAINLEVEL_OUTOF10: 6
PAINLEVEL_OUTOF10: 1

## 2024-06-15 ASSESSMENT — PAIN DESCRIPTION - LOCATION
LOCATION: LEG
LOCATION: LEG

## 2024-06-15 ASSESSMENT — PAIN - FUNCTIONAL ASSESSMENT: PAIN_FUNCTIONAL_ASSESSMENT: ACTIVITIES ARE NOT PREVENTED

## 2024-06-16 PROCEDURE — 1100000000 HC RM PRIVATE

## 2024-06-16 PROCEDURE — 6360000002 HC RX W HCPCS: Performed by: STUDENT IN AN ORGANIZED HEALTH CARE EDUCATION/TRAINING PROGRAM

## 2024-06-16 PROCEDURE — 6370000000 HC RX 637 (ALT 250 FOR IP): Performed by: HOSPITALIST

## 2024-06-16 PROCEDURE — 2580000003 HC RX 258: Performed by: HOSPITALIST

## 2024-06-16 PROCEDURE — 2580000003 HC RX 258: Performed by: ORTHOPAEDIC SURGERY

## 2024-06-16 PROCEDURE — 6370000000 HC RX 637 (ALT 250 FOR IP): Performed by: STUDENT IN AN ORGANIZED HEALTH CARE EDUCATION/TRAINING PROGRAM

## 2024-06-16 PROCEDURE — 6370000000 HC RX 637 (ALT 250 FOR IP): Performed by: ORTHOPAEDIC SURGERY

## 2024-06-16 RX ORDER — MAGNESIUM SULFATE IN WATER 40 MG/ML
2000 INJECTION, SOLUTION INTRAVENOUS ONCE
Status: COMPLETED | OUTPATIENT
Start: 2024-06-16 | End: 2024-06-16

## 2024-06-16 RX ADMIN — PANTOPRAZOLE SODIUM 40 MG: 40 TABLET, DELAYED RELEASE ORAL at 08:56

## 2024-06-16 RX ADMIN — TORSEMIDE 50 MG: 100 TABLET ORAL at 08:56

## 2024-06-16 RX ADMIN — TRAZODONE HYDROCHLORIDE 300 MG: 50 TABLET ORAL at 21:19

## 2024-06-16 RX ADMIN — TAMSULOSIN HYDROCHLORIDE 0.4 MG: 0.4 CAPSULE ORAL at 08:56

## 2024-06-16 RX ADMIN — ASPIRIN 325 MG: 325 TABLET, COATED ORAL at 08:55

## 2024-06-16 RX ADMIN — DOXYCYCLINE HYCLATE 100 MG: 100 CAPSULE ORAL at 21:19

## 2024-06-16 RX ADMIN — HYDROXYCHLOROQUINE SULFATE 200 MG: 200 TABLET ORAL at 08:55

## 2024-06-16 RX ADMIN — SODIUM CHLORIDE, PRESERVATIVE FREE 10 ML: 5 INJECTION INTRAVENOUS at 08:57

## 2024-06-16 RX ADMIN — LORAZEPAM 2 MG: 1 TABLET ORAL at 21:19

## 2024-06-16 RX ADMIN — MAGNESIUM GLUCONATE 500 MG ORAL TABLET 400 MG: 500 TABLET ORAL at 08:56

## 2024-06-16 RX ADMIN — PREGABALIN 150 MG: 75 CAPSULE ORAL at 21:20

## 2024-06-16 RX ADMIN — MAGNESIUM SULFATE HEPTAHYDRATE 2000 MG: 40 INJECTION, SOLUTION INTRAVENOUS at 09:02

## 2024-06-16 RX ADMIN — HYDROXYCHLOROQUINE SULFATE 200 MG: 200 TABLET ORAL at 21:20

## 2024-06-16 RX ADMIN — PREGABALIN 150 MG: 75 CAPSULE ORAL at 08:55

## 2024-06-16 RX ADMIN — PREDNISONE 10 MG: 5 TABLET ORAL at 08:56

## 2024-06-16 RX ADMIN — DOXEPIN HYDROCHLORIDE 150 MG: 50 CAPSULE ORAL at 21:19

## 2024-06-16 RX ADMIN — ESCITALOPRAM OXALATE 10 MG: 10 TABLET ORAL at 08:56

## 2024-06-16 RX ADMIN — ALLOPURINOL 100 MG: 100 TABLET ORAL at 08:55

## 2024-06-16 RX ADMIN — DOCUSATE SODIUM 50 MG AND SENNOSIDES 8.6 MG 2 TABLET: 8.6; 5 TABLET, FILM COATED ORAL at 08:56

## 2024-06-16 RX ADMIN — DOXYCYCLINE HYCLATE 100 MG: 100 CAPSULE ORAL at 08:57

## 2024-06-17 LAB
ANION GAP SERPL CALC-SCNC: 7 MMOL/L (ref 9–18)
BACTERIA SPEC CULT: NORMAL
BUN SERPL-MCNC: 26 MG/DL (ref 8–23)
CALCIUM SERPL-MCNC: 8.9 MG/DL (ref 8.8–10.2)
CHLORIDE SERPL-SCNC: 97 MMOL/L (ref 98–107)
CO2 SERPL-SCNC: 37 MMOL/L (ref 20–28)
CREAT SERPL-MCNC: 0.68 MG/DL (ref 0.6–1.1)
ERYTHROCYTE [DISTWIDTH] IN BLOOD BY AUTOMATED COUNT: 16.9 % (ref 11.9–14.6)
FUNGUS SMEAR: NORMAL
GLUCOSE SERPL-MCNC: 92 MG/DL (ref 70–99)
HCT VFR BLD AUTO: 27.8 % (ref 35.8–46.3)
HGB BLD-MCNC: 8.4 G/DL (ref 11.7–15.4)
MAGNESIUM SERPL-MCNC: 1.8 MG/DL (ref 1.8–2.4)
MCH RBC QN AUTO: 28.4 PG (ref 26.1–32.9)
MCHC RBC AUTO-ENTMCNC: 30.2 G/DL (ref 31.4–35)
MCV RBC AUTO: 93.9 FL (ref 82–102)
NRBC # BLD: 0 K/UL (ref 0–0.2)
PLATELET # BLD AUTO: 288 K/UL (ref 150–450)
PMV BLD AUTO: 10 FL (ref 9.4–12.3)
POTASSIUM SERPL-SCNC: 3.1 MMOL/L (ref 3.5–5.1)
RBC # BLD AUTO: 2.96 M/UL (ref 4.05–5.2)
SERVICE CMNT-IMP: NORMAL
SODIUM SERPL-SCNC: 142 MMOL/L (ref 136–145)
SPECIMEN SOURCE: NORMAL
WBC # BLD AUTO: 8.8 K/UL (ref 4.3–11.1)

## 2024-06-17 PROCEDURE — 2580000003 HC RX 258: Performed by: ORTHOPAEDIC SURGERY

## 2024-06-17 PROCEDURE — 2580000003 HC RX 258: Performed by: HOSPITALIST

## 2024-06-17 PROCEDURE — 80048 BASIC METABOLIC PNL TOTAL CA: CPT

## 2024-06-17 PROCEDURE — 36415 COLL VENOUS BLD VENIPUNCTURE: CPT

## 2024-06-17 PROCEDURE — 85027 COMPLETE CBC AUTOMATED: CPT

## 2024-06-17 PROCEDURE — 97605 NEG PRS WND THER DME<=50SQCM: CPT

## 2024-06-17 PROCEDURE — 6370000000 HC RX 637 (ALT 250 FOR IP): Performed by: ORTHOPAEDIC SURGERY

## 2024-06-17 PROCEDURE — 6370000000 HC RX 637 (ALT 250 FOR IP): Performed by: HOSPITALIST

## 2024-06-17 PROCEDURE — 1100000000 HC RM PRIVATE

## 2024-06-17 PROCEDURE — 83735 ASSAY OF MAGNESIUM: CPT

## 2024-06-17 PROCEDURE — 6370000000 HC RX 637 (ALT 250 FOR IP): Performed by: STUDENT IN AN ORGANIZED HEALTH CARE EDUCATION/TRAINING PROGRAM

## 2024-06-17 RX ORDER — POTASSIUM CHLORIDE 20 MEQ/1
40 TABLET, EXTENDED RELEASE ORAL ONCE
Status: COMPLETED | OUTPATIENT
Start: 2024-06-17 | End: 2024-06-17

## 2024-06-17 RX ADMIN — HYDROCODONE BITARTRATE AND ACETAMINOPHEN 1 TABLET: 5; 325 TABLET ORAL at 10:42

## 2024-06-17 RX ADMIN — SODIUM CHLORIDE, PRESERVATIVE FREE 10 ML: 5 INJECTION INTRAVENOUS at 09:05

## 2024-06-17 RX ADMIN — HYDROCODONE BITARTRATE AND ACETAMINOPHEN 1 TABLET: 5; 325 TABLET ORAL at 03:21

## 2024-06-17 RX ADMIN — LORAZEPAM 2 MG: 1 TABLET ORAL at 21:18

## 2024-06-17 RX ADMIN — PREDNISONE 10 MG: 5 TABLET ORAL at 08:51

## 2024-06-17 RX ADMIN — DOXEPIN HYDROCHLORIDE 150 MG: 50 CAPSULE ORAL at 21:18

## 2024-06-17 RX ADMIN — PREGABALIN 150 MG: 75 CAPSULE ORAL at 08:50

## 2024-06-17 RX ADMIN — TRAZODONE HYDROCHLORIDE 300 MG: 50 TABLET ORAL at 21:18

## 2024-06-17 RX ADMIN — DOXYCYCLINE HYCLATE 100 MG: 100 CAPSULE ORAL at 08:55

## 2024-06-17 RX ADMIN — SODIUM CHLORIDE, PRESERVATIVE FREE 10 ML: 5 INJECTION INTRAVENOUS at 09:04

## 2024-06-17 RX ADMIN — TAMSULOSIN HYDROCHLORIDE 0.4 MG: 0.4 CAPSULE ORAL at 08:50

## 2024-06-17 RX ADMIN — POTASSIUM CHLORIDE 40 MEQ: 1500 TABLET, EXTENDED RELEASE ORAL at 08:56

## 2024-06-17 RX ADMIN — PREGABALIN 150 MG: 75 CAPSULE ORAL at 21:18

## 2024-06-17 RX ADMIN — MAGNESIUM GLUCONATE 500 MG ORAL TABLET 400 MG: 500 TABLET ORAL at 08:50

## 2024-06-17 RX ADMIN — TORSEMIDE 50 MG: 100 TABLET ORAL at 08:52

## 2024-06-17 RX ADMIN — SODIUM CHLORIDE, PRESERVATIVE FREE 10 ML: 5 INJECTION INTRAVENOUS at 09:02

## 2024-06-17 RX ADMIN — DOXYCYCLINE HYCLATE 100 MG: 100 CAPSULE ORAL at 21:18

## 2024-06-17 RX ADMIN — PANTOPRAZOLE SODIUM 40 MG: 40 TABLET, DELAYED RELEASE ORAL at 08:51

## 2024-06-17 RX ADMIN — HYDROXYCHLOROQUINE SULFATE 200 MG: 200 TABLET ORAL at 08:55

## 2024-06-17 RX ADMIN — ASPIRIN 325 MG: 325 TABLET, COATED ORAL at 08:50

## 2024-06-17 RX ADMIN — DOCUSATE SODIUM 50 MG AND SENNOSIDES 8.6 MG 2 TABLET: 8.6; 5 TABLET, FILM COATED ORAL at 10:23

## 2024-06-17 RX ADMIN — ESCITALOPRAM OXALATE 10 MG: 10 TABLET ORAL at 08:50

## 2024-06-17 RX ADMIN — HYDROXYCHLOROQUINE SULFATE 200 MG: 200 TABLET ORAL at 21:18

## 2024-06-17 RX ADMIN — SODIUM CHLORIDE, PRESERVATIVE FREE 10 ML: 5 INJECTION INTRAVENOUS at 19:48

## 2024-06-17 RX ADMIN — ALLOPURINOL 100 MG: 100 TABLET ORAL at 08:50

## 2024-06-17 ASSESSMENT — PAIN DESCRIPTION - LOCATION: LOCATION: LEG

## 2024-06-17 ASSESSMENT — PAIN SCALES - GENERAL
PAINLEVEL_OUTOF10: 8
PAINLEVEL_OUTOF10: 5
PAINLEVEL_OUTOF10: 2
PAINLEVEL_OUTOF10: 0
PAINLEVEL_OUTOF10: 0

## 2024-06-17 ASSESSMENT — PAIN DESCRIPTION - ORIENTATION: ORIENTATION: RIGHT

## 2024-06-17 ASSESSMENT — PAIN DESCRIPTION - DESCRIPTORS: DESCRIPTORS: SHARP

## 2024-06-17 NOTE — PLAN OF CARE
Problem: Discharge Planning  Goal: Discharge to home or other facility with appropriate resources  6/17/2024 1232 by Vonda Romero RN  Outcome: Progressing  6/17/2024 1232 by Vonda Romero RN  Outcome: Progressing     Problem: Skin/Tissue Integrity  Goal: Absence of new skin breakdown  Description: 1.  Monitor for areas of redness and/or skin breakdown  2.  Assess vascular access sites hourly  3.  Every 4-6 hours minimum:  Change oxygen saturation probe site  4.  Every 4-6 hours:  If on nasal continuous positive airway pressure, respiratory therapy assess nares and determine need for appliance change or resting period.  6/17/2024 1232 by Vonda Romero RN  Outcome: Progressing  6/17/2024 1232 by Vonda Romero RN  Outcome: Progressing     Problem: Safety - Adult  Goal: Free from fall injury  6/17/2024 1232 by Vonda Romero RN  Outcome: Progressing  6/17/2024 1232 by Vonda Romero RN  Outcome: Progressing     Problem: Pain  Goal: Verbalizes/displays adequate comfort level or baseline comfort level  6/17/2024 1232 by Vonda Romero RN  Outcome: Progressing  6/17/2024 1232 by Vonda Romero RN  Outcome: Progressing

## 2024-06-17 NOTE — CARE COORDINATION
CM reviewed pt chart for continued stay.  LMSW met with pt and daughter at bedside.  They have chosen additional referrals to [facility], [facility] and [facility].  Referral sent as requested.  Will continue to follow pt plan of care and assist further with supportive care planning as appropriate.

## 2024-06-17 NOTE — WOUND CARE
Wound vac dressing change, wound granular, tunnel decreased now 6cm. Seal achieved, machine working well.

## 2024-06-18 LAB
ANION GAP SERPL CALC-SCNC: 9 MMOL/L (ref 9–18)
BUN SERPL-MCNC: 26 MG/DL (ref 8–23)
CALCIUM SERPL-MCNC: 8.8 MG/DL (ref 8.8–10.2)
CHLORIDE SERPL-SCNC: 98 MMOL/L (ref 98–107)
CO2 SERPL-SCNC: 37 MMOL/L (ref 20–28)
CREAT SERPL-MCNC: 0.79 MG/DL (ref 0.6–1.1)
ERYTHROCYTE [DISTWIDTH] IN BLOOD BY AUTOMATED COUNT: 17 % (ref 11.9–14.6)
GLUCOSE SERPL-MCNC: 91 MG/DL (ref 70–99)
HCT VFR BLD AUTO: 27.4 % (ref 35.8–46.3)
HGB BLD-MCNC: 8.3 G/DL (ref 11.7–15.4)
MCH RBC QN AUTO: 28.7 PG (ref 26.1–32.9)
MCHC RBC AUTO-ENTMCNC: 30.3 G/DL (ref 31.4–35)
MCV RBC AUTO: 94.8 FL (ref 82–102)
NRBC # BLD: 0 K/UL (ref 0–0.2)
PLATELET # BLD AUTO: 278 K/UL (ref 150–450)
PMV BLD AUTO: 10.3 FL (ref 9.4–12.3)
POTASSIUM SERPL-SCNC: 3.6 MMOL/L (ref 3.5–5.1)
RBC # BLD AUTO: 2.89 M/UL (ref 4.05–5.2)
SODIUM SERPL-SCNC: 144 MMOL/L (ref 136–145)
WBC # BLD AUTO: 10.2 K/UL (ref 4.3–11.1)

## 2024-06-18 PROCEDURE — 85027 COMPLETE CBC AUTOMATED: CPT

## 2024-06-18 PROCEDURE — 6370000000 HC RX 637 (ALT 250 FOR IP): Performed by: HOSPITALIST

## 2024-06-18 PROCEDURE — 2580000003 HC RX 258: Performed by: ORTHOPAEDIC SURGERY

## 2024-06-18 PROCEDURE — 97530 THERAPEUTIC ACTIVITIES: CPT

## 2024-06-18 PROCEDURE — 1100000000 HC RM PRIVATE

## 2024-06-18 PROCEDURE — 6370000000 HC RX 637 (ALT 250 FOR IP): Performed by: ORTHOPAEDIC SURGERY

## 2024-06-18 PROCEDURE — 2580000003 HC RX 258: Performed by: HOSPITALIST

## 2024-06-18 PROCEDURE — 6360000002 HC RX W HCPCS: Performed by: HOSPITALIST

## 2024-06-18 PROCEDURE — 6370000000 HC RX 637 (ALT 250 FOR IP): Performed by: STUDENT IN AN ORGANIZED HEALTH CARE EDUCATION/TRAINING PROGRAM

## 2024-06-18 PROCEDURE — 36415 COLL VENOUS BLD VENIPUNCTURE: CPT

## 2024-06-18 PROCEDURE — 97112 NEUROMUSCULAR REEDUCATION: CPT

## 2024-06-18 PROCEDURE — 80048 BASIC METABOLIC PNL TOTAL CA: CPT

## 2024-06-18 RX ADMIN — LORAZEPAM 2 MG: 1 TABLET ORAL at 21:33

## 2024-06-18 RX ADMIN — DOXYCYCLINE HYCLATE 100 MG: 100 CAPSULE ORAL at 09:16

## 2024-06-18 RX ADMIN — HYDROXYCHLOROQUINE SULFATE 200 MG: 200 TABLET ORAL at 09:18

## 2024-06-18 RX ADMIN — SODIUM CHLORIDE, PRESERVATIVE FREE 10 ML: 5 INJECTION INTRAVENOUS at 21:34

## 2024-06-18 RX ADMIN — ONDANSETRON 4 MG: 2 INJECTION INTRAMUSCULAR; INTRAVENOUS at 07:53

## 2024-06-18 RX ADMIN — ALLOPURINOL 100 MG: 100 TABLET ORAL at 09:15

## 2024-06-18 RX ADMIN — MAGNESIUM GLUCONATE 500 MG ORAL TABLET 400 MG: 500 TABLET ORAL at 09:15

## 2024-06-18 RX ADMIN — DOXEPIN HYDROCHLORIDE 150 MG: 50 CAPSULE ORAL at 21:34

## 2024-06-18 RX ADMIN — SODIUM CHLORIDE, PRESERVATIVE FREE 10 ML: 5 INJECTION INTRAVENOUS at 09:24

## 2024-06-18 RX ADMIN — PREGABALIN 150 MG: 75 CAPSULE ORAL at 09:15

## 2024-06-18 RX ADMIN — TAMSULOSIN HYDROCHLORIDE 0.4 MG: 0.4 CAPSULE ORAL at 09:15

## 2024-06-18 RX ADMIN — PREDNISONE 10 MG: 5 TABLET ORAL at 09:15

## 2024-06-18 RX ADMIN — TORSEMIDE 50 MG: 100 TABLET ORAL at 13:58

## 2024-06-18 RX ADMIN — HYDROXYCHLOROQUINE SULFATE 200 MG: 200 TABLET ORAL at 21:34

## 2024-06-18 RX ADMIN — PREGABALIN 150 MG: 75 CAPSULE ORAL at 21:33

## 2024-06-18 RX ADMIN — ESCITALOPRAM OXALATE 10 MG: 10 TABLET ORAL at 09:15

## 2024-06-18 RX ADMIN — DOCUSATE SODIUM 50 MG AND SENNOSIDES 8.6 MG 2 TABLET: 8.6; 5 TABLET, FILM COATED ORAL at 09:15

## 2024-06-18 RX ADMIN — TRAZODONE HYDROCHLORIDE 300 MG: 50 TABLET ORAL at 21:33

## 2024-06-18 RX ADMIN — SODIUM CHLORIDE, PRESERVATIVE FREE 10 ML: 5 INJECTION INTRAVENOUS at 09:23

## 2024-06-18 RX ADMIN — HYDROCODONE BITARTRATE AND ACETAMINOPHEN 1 TABLET: 5; 325 TABLET ORAL at 07:53

## 2024-06-18 RX ADMIN — DOXYCYCLINE HYCLATE 100 MG: 100 CAPSULE ORAL at 21:33

## 2024-06-18 RX ADMIN — PANTOPRAZOLE SODIUM 40 MG: 40 TABLET, DELAYED RELEASE ORAL at 09:15

## 2024-06-18 RX ADMIN — ASPIRIN 325 MG: 325 TABLET, COATED ORAL at 09:21

## 2024-06-18 ASSESSMENT — PAIN SCALES - GENERAL
PAINLEVEL_OUTOF10: 0
PAINLEVEL_OUTOF10: 0
PAINLEVEL_OUTOF10: 5

## 2024-06-18 ASSESSMENT — PAIN DESCRIPTION - DESCRIPTORS: DESCRIPTORS: ACHING

## 2024-06-18 ASSESSMENT — PAIN DESCRIPTION - ORIENTATION: ORIENTATION: LEFT;UPPER

## 2024-06-18 ASSESSMENT — PAIN DESCRIPTION - LOCATION: LOCATION: LEG

## 2024-06-18 NOTE — CARE COORDINATION
[facility] and [facility] did not offer pt placement.  [facility] did offer pt a bed but they cannot accommodate pt's use of a pure wick catheter system and pt has this system at home and feels she needs it to use especially overnight.  Pt/daughter agreed to a referral to [facility] as they can accept her with the pure wick system and daughter visited facility and after consideration chose to accept the bed offer at [facility].  [facility] can accept pt for care tomorrow.  Will plan for transport just after lunch and daughter will be available to accompany pt to facility.

## 2024-06-18 NOTE — PLAN OF CARE
Problem: Discharge Planning  Goal: Discharge to home or other facility with appropriate resources  Outcome: Progressing     Problem: Skin/Tissue Integrity  Goal: Absence of new skin breakdown  Description: 1.  Monitor for areas of redness and/or skin breakdown  2.  Assess vascular access sites hourly  3.  Every 4-6 hours minimum:  Change oxygen saturation probe site  4.  Every 4-6 hours:  If on nasal continuous positive airway pressure, respiratory therapy assess nares and determine need for appliance change or resting period.  Outcome: Progressing     Problem: Safety - Adult  Goal: Free from fall injury  Outcome: Progressing     Problem: Pain  Goal: Verbalizes/displays adequate comfort level or baseline comfort level  Outcome: Progressing   Waiting on rehab placement

## 2024-06-19 VITALS
SYSTOLIC BLOOD PRESSURE: 109 MMHG | WEIGHT: 293 LBS | TEMPERATURE: 98.2 F | HEART RATE: 70 BPM | DIASTOLIC BLOOD PRESSURE: 58 MMHG | BODY MASS INDEX: 44.41 KG/M2 | OXYGEN SATURATION: 90 % | RESPIRATION RATE: 16 BRPM | HEIGHT: 68 IN

## 2024-06-19 LAB
ANION GAP SERPL CALC-SCNC: 7 MMOL/L (ref 9–18)
BUN SERPL-MCNC: 21 MG/DL (ref 8–23)
CALCIUM SERPL-MCNC: 8.8 MG/DL (ref 8.8–10.2)
CHLORIDE SERPL-SCNC: 96 MMOL/L (ref 98–107)
CO2 SERPL-SCNC: 41 MMOL/L (ref 20–28)
CREAT SERPL-MCNC: 0.81 MG/DL (ref 0.6–1.1)
ERYTHROCYTE [DISTWIDTH] IN BLOOD BY AUTOMATED COUNT: 16.9 % (ref 11.9–14.6)
GLUCOSE BLD STRIP.AUTO-MCNC: 124 MG/DL (ref 65–100)
GLUCOSE BLD STRIP.AUTO-MCNC: 89 MG/DL (ref 65–100)
GLUCOSE SERPL-MCNC: 94 MG/DL (ref 70–99)
HCT VFR BLD AUTO: 29.4 % (ref 35.8–46.3)
HGB BLD-MCNC: 8.7 G/DL (ref 11.7–15.4)
MCH RBC QN AUTO: 28.2 PG (ref 26.1–32.9)
MCHC RBC AUTO-ENTMCNC: 29.6 G/DL (ref 31.4–35)
MCV RBC AUTO: 95.1 FL (ref 82–102)
NRBC # BLD: 0 K/UL (ref 0–0.2)
PLATELET # BLD AUTO: 271 K/UL (ref 150–450)
PMV BLD AUTO: 10.1 FL (ref 9.4–12.3)
POTASSIUM SERPL-SCNC: 3.6 MMOL/L (ref 3.5–5.1)
RBC # BLD AUTO: 3.09 M/UL (ref 4.05–5.2)
SERVICE CMNT-IMP: ABNORMAL
SERVICE CMNT-IMP: NORMAL
SODIUM SERPL-SCNC: 144 MMOL/L (ref 136–145)
WBC # BLD AUTO: 8.6 K/UL (ref 4.3–11.1)

## 2024-06-19 PROCEDURE — 6370000000 HC RX 637 (ALT 250 FOR IP): Performed by: ORTHOPAEDIC SURGERY

## 2024-06-19 PROCEDURE — 6370000000 HC RX 637 (ALT 250 FOR IP): Performed by: HOSPITALIST

## 2024-06-19 PROCEDURE — 80048 BASIC METABOLIC PNL TOTAL CA: CPT

## 2024-06-19 PROCEDURE — 85027 COMPLETE CBC AUTOMATED: CPT

## 2024-06-19 PROCEDURE — 2580000003 HC RX 258: Performed by: ORTHOPAEDIC SURGERY

## 2024-06-19 PROCEDURE — 36415 COLL VENOUS BLD VENIPUNCTURE: CPT

## 2024-06-19 PROCEDURE — 82962 GLUCOSE BLOOD TEST: CPT

## 2024-06-19 PROCEDURE — 6370000000 HC RX 637 (ALT 250 FOR IP): Performed by: STUDENT IN AN ORGANIZED HEALTH CARE EDUCATION/TRAINING PROGRAM

## 2024-06-19 PROCEDURE — 6360000002 HC RX W HCPCS: Performed by: ORTHOPAEDIC SURGERY

## 2024-06-19 PROCEDURE — 2580000003 HC RX 258: Performed by: HOSPITALIST

## 2024-06-19 PROCEDURE — 97605 NEG PRS WND THER DME<=50SQCM: CPT

## 2024-06-19 RX ORDER — HYDROCODONE BITARTRATE AND ACETAMINOPHEN 5; 325 MG/1; MG/1
1 TABLET ORAL EVERY 4 HOURS PRN
Qty: 18 TABLET | Refills: 0 | Status: SHIPPED | OUTPATIENT
Start: 2024-06-19 | End: 2024-06-22

## 2024-06-19 RX ORDER — ASPIRIN 325 MG
325 TABLET, DELAYED RELEASE (ENTERIC COATED) ORAL DAILY
Qty: 30 TABLET | Refills: 1 | Status: SHIPPED | OUTPATIENT
Start: 2024-06-20

## 2024-06-19 RX ORDER — DOXYCYCLINE HYCLATE 100 MG/1
100 CAPSULE ORAL EVERY 12 HOURS SCHEDULED
Qty: 7 CAPSULE | Refills: 0 | Status: SHIPPED | OUTPATIENT
Start: 2024-06-19 | End: 2024-06-23

## 2024-06-19 RX ORDER — TRAMADOL HYDROCHLORIDE 50 MG/1
50 TABLET ORAL EVERY 6 HOURS PRN
Qty: 12 TABLET | Refills: 0 | Status: SHIPPED | OUTPATIENT
Start: 2024-06-19 | End: 2024-06-22

## 2024-06-19 RX ADMIN — HYDROXYCHLOROQUINE SULFATE 200 MG: 200 TABLET ORAL at 08:36

## 2024-06-19 RX ADMIN — TORSEMIDE 50 MG: 100 TABLET ORAL at 08:36

## 2024-06-19 RX ADMIN — HYDROMORPHONE HYDROCHLORIDE 0.5 MG: 1 INJECTION, SOLUTION INTRAMUSCULAR; INTRAVENOUS; SUBCUTANEOUS at 09:04

## 2024-06-19 RX ADMIN — DOXYCYCLINE HYCLATE 100 MG: 100 CAPSULE ORAL at 08:37

## 2024-06-19 RX ADMIN — ALLOPURINOL 100 MG: 100 TABLET ORAL at 08:36

## 2024-06-19 RX ADMIN — HYDROCODONE BITARTRATE AND ACETAMINOPHEN 1 TABLET: 5; 325 TABLET ORAL at 06:29

## 2024-06-19 RX ADMIN — PANTOPRAZOLE SODIUM 40 MG: 40 TABLET, DELAYED RELEASE ORAL at 08:37

## 2024-06-19 RX ADMIN — PREDNISONE 10 MG: 5 TABLET ORAL at 08:36

## 2024-06-19 RX ADMIN — MAGNESIUM GLUCONATE 500 MG ORAL TABLET 400 MG: 500 TABLET ORAL at 08:36

## 2024-06-19 RX ADMIN — SODIUM CHLORIDE, PRESERVATIVE FREE 10 ML: 5 INJECTION INTRAVENOUS at 08:37

## 2024-06-19 RX ADMIN — ASPIRIN 325 MG: 325 TABLET, COATED ORAL at 08:36

## 2024-06-19 RX ADMIN — ESCITALOPRAM OXALATE 10 MG: 10 TABLET ORAL at 08:36

## 2024-06-19 RX ADMIN — DOCUSATE SODIUM 50 MG AND SENNOSIDES 8.6 MG 2 TABLET: 8.6; 5 TABLET, FILM COATED ORAL at 08:36

## 2024-06-19 RX ADMIN — HYDROCODONE BITARTRATE AND ACETAMINOPHEN 1 TABLET: 5; 325 TABLET ORAL at 13:23

## 2024-06-19 RX ADMIN — PREGABALIN 150 MG: 75 CAPSULE ORAL at 08:36

## 2024-06-19 RX ADMIN — TAMSULOSIN HYDROCHLORIDE 0.4 MG: 0.4 CAPSULE ORAL at 08:37

## 2024-06-19 ASSESSMENT — PAIN SCALES - GENERAL
PAINLEVEL_OUTOF10: 8
PAINLEVEL_OUTOF10: 0
PAINLEVEL_OUTOF10: 7
PAINLEVEL_OUTOF10: 7

## 2024-06-19 ASSESSMENT — PAIN DESCRIPTION - LOCATION
LOCATION: LEG
LOCATION: LEG
LOCATION: INCISION

## 2024-06-19 ASSESSMENT — PAIN DESCRIPTION - DESCRIPTORS
DESCRIPTORS: ACHING

## 2024-06-19 ASSESSMENT — PAIN DESCRIPTION - ORIENTATION
ORIENTATION: RIGHT

## 2024-06-19 NOTE — CARE COORDINATION
Pt is for discharge today to [facility] as planned.  Transport via [facility] around 1330.  Packet prepared to go with pt to facility.  Spoke with pt familia luisa Negron at bedside with update on anticipated transport time.       06/19/24 1338   Service Assessment   Patient's Healthcare Decision Maker is: Legal Next of Kin   Social/Functional History   Lives With Alone   Type of Home House   Services At/After Discharge   Transition of Care Consult (CM Consult) SNF;Discharge Planning   Partner SNF No   Reason Why Partner SNF Not Chosen Bed availability   Services At/After Discharge Skilled Nursing Facility (SNF);In ambulance  ([facility])   [place] Resource Information Provided? No   Mode of Transport at Discharge BLS  ([facility])   Hospital Transport Time of Discharge 1330   Confirm Follow Up Transport Family   Condition of Participation: Discharge Planning   The Plan for Transition of Care is related to the following treatment goals: Pt will need rehab placement to return to her functional baseline.   The Patient and/or Patient Representative was provided with a Choice of Provider? Patient;Patient Representative   Name of the Patient Representative who was provided with the Choice of Provider and agrees with the Discharge Plan?  luisa Dueñaskaci   The Patient and/Or Patient Representative agree with the Discharge Plan? Yes   Freedom of Choice list was provided with basic dialogue that supports the patient's individualized plan of care/goals, treatment preferences, and shares the quality data associated with the providers?  Yes

## 2024-06-19 NOTE — PROGRESS NOTES
Hospitalist Progress Note   Admit Date:  2024 11:11 PM   Name:  Ama Chu   Age:  74 y.o.  Sex:  female  :  1950   MRN:  [id number]   Room:  [facility]/    Presenting/Chief Complaint: No chief complaint on file.     Reason(s) for Admission: Abscess of right thigh [L02.415]     Hospital Course:     74 years old female with history of morbid obesity, history of gout, SLE, chronic diastolic congestive heart failure on diuretics rheumatoid arthritis presented to emergency room with chief complaint of right leg pain with redness and swelling involving the medial part of right lower thigh which gradually got worse.  Patient's daughter is a nurse practitioner who brought her to the emergency room for further evaluation.  No history of nausea, vomiting, chest pain, shortness of breath.  Evaluated in emergency room, CAT scan of right lower extremity shows evidence of abscess just medial to right knee joint.  CRP is significantly elevated.  White count is within normal limits.  Patient was given antibiotics, orthopedics was consulted and recommended admission to [facility].  Patient was started on IV antibiotics and is status post I&D on 2024, with wound VAC in place.  Pending PT/OT follow-up.  Will switch to p.o. antibiotics on discharge.      Subjective & 24hr Events:   Seen and examined at bedside this morning.  No acute events overnight.  Patient is POD 2 right thigh I&D with wound VAC.  She denies any complaints at this time.  PT recommends discharge to short-term rehab.  Pending facility availability.    Assessment & Plan:       Abscess of right thigh  Ortho consulted: Status post I&D  Cultures pending  CT femur: shows comminuted fluid collection with surrounding inflammatory changes  Switched to doxycycline and Keflex  Blood cultures negative to date  ID consulted: Recommend antibiotic treatment for 7 to 10 days  PT/OT    Obesity  Complicates care    Adrenal insufficiency  SLE  Continue Plaquenil  On 
       Hospitalist Progress Note   Admit Date:  2024 11:11 PM   Name:  Ama Chu   Age:  74 y.o.  Sex:  female  :  1950   MRN:  [id number]   Room:  [facility]/    Presenting/Chief Complaint: No chief complaint on file.     Reason(s) for Admission: Abscess of right thigh [L02.415]     Hospital Course:     74 years old female with history of morbid obesity, history of gout, SLE, chronic diastolic congestive heart failure on diuretics rheumatoid arthritis presented to emergency room with chief complaint of right leg pain with redness and swelling involving the medial part of right lower thigh which gradually got worse.  Patient's daughter is a nurse practitioner who brought her to the emergency room for further evaluation.  No history of nausea, vomiting, chest pain, shortness of breath.  Evaluated in emergency room, CAT scan of right lower extremity shows evidence of abscess just medial to right knee joint.  CRP is significantly elevated.  White count is within normal limits.  Patient was given antibiotics, orthopedics was consulted and recommended admission to [facility].  Patient was started on IV antibiotics and is status post I&D on 2024, with wound VAC in place.  ID consulted and patient was switched to doxycycline for treatment plan of 7 to 10 days.  PT/OT recommended discharge to short-term rehab.  Per patient's daughter she wants patient to go to inpatient rehab instead.  Patient was denied by inpatient rehab.  Patient's daughter still wants evaluation by [facility].  Pending short-term rehab facilities at this time.    Subjective & 24hr Events:   Seen and examined at bedside this morning.  No acute events overnight.  Denies any complaints.  Pending short-term rehab facility availability.  Patient has been seen by the inpatient rehab facilities.    Assessment & Plan:       Abscess of right thigh  Ortho consulted: Status post I&D  Cultures pending  CT femur: shows comminuted fluid collection with 
       Hospitalist Progress Note   Admit Date:  2024 11:11 PM   Name:  Ama Chu   Age:  74 y.o.  Sex:  female  :  1950   MRN:  [id number]   Room:  [facility]/    Presenting/Chief Complaint: No chief complaint on file.     Reason(s) for Admission: Abscess of right thigh [L02.415]     Hospital Course:     74 years old female with history of morbid obesity, history of gout, SLE, chronic diastolic congestive heart failure on diuretics rheumatoid arthritis presented to emergency room with chief complaint of right leg pain with redness and swelling involving the medial part of right lower thigh which gradually got worse.  Patient's daughter is a nurse practitioner who brought her to the emergency room for further evaluation.  No history of nausea, vomiting, chest pain, shortness of breath.  Evaluated in emergency room, CAT scan of right lower extremity shows evidence of abscess just medial to right knee joint.  CRP is significantly elevated.  White count is within normal limits.  Patient was given antibiotics, orthopedics was contacted recommended admission to [facility], they would further evaluate and decide about incision and drainage.  Patient was given broad-spectrum antibiotics in emergency room.  Patient admitted for further management.  Of note patient has had similar experience in the past after she had left knee surgery.  She had left thigh abscess which was drained and connected to wound VAC.      Subjective & 24hr Events:   Seen and examined at bedside this morning.  Daughter noted at bedside as well.  No acute events overnight.  Patient still endorses pain on right thigh.  Ortho consulted for further management.      Assessment & Plan:       Abscess of right thigh  Ortho consulted, plan for I&D tomorrow  CT femur: shows comminuted fluid collection with surrounding inflammatory changes  Continue broad-spectrum antibiotics  Blood cultures negative to date    Obesity  Complicates care    Adrenal 
       Hospitalist Progress Note   Admit Date:  2024 11:11 PM   Name:  Ama Chu   Age:  74 y.o.  Sex:  female  :  1950   MRN:  [id number]   Room:  [facility]/    Presenting/Chief Complaint: No chief complaint on file.     Reason(s) for Admission: Abscess of right thigh [L02.415]     Hospital Course:     74 years old female with history of morbid obesity, history of gout, SLE, chronic diastolic congestive heart failure on diuretics rheumatoid arthritis presented to emergency room with chief complaint of right leg pain with redness and swelling involving the medial part of right lower thigh which gradually got worse.  Patient's daughter is a nurse practitioner who brought her to the emergency room for further evaluation.  No history of nausea, vomiting, chest pain, shortness of breath.  Evaluated in emergency room, CAT scan of right lower extremity shows evidence of abscess just medial to right knee joint.  CRP is significantly elevated.  White count is within normal limits.  Patient was given antibiotics, orthopedics was consulted and recommended admission to [facility].  Patient was started on IV antibiotics and is status post I&D on 2024, with wound VAC in place.  Pending PT/OT follow-up.  Will switch to p.o. antibiotics on discharge.      Subjective & 24hr Events:   Seen and examined at bedside this morning.  No acute events overnight.  Patient is POD 1 right thigh I&D with wound VAC.  She denies any complaints at this time.    Assessment & Plan:       Abscess of right thigh  Ortho consulted: Status post I&D  Cultures pending  CT femur: shows comminuted fluid collection with surrounding inflammatory changes  Continue broad-spectrum antibiotics.  Switch to p.o. antibiotics on discharge  Blood cultures negative to date  PT/OT    Obesity  Complicates care    Adrenal insufficiency  SLE  Continue Plaquenil  On prednisone 5 mg daily, will increase to 10 mg due to thigh abscess    Hypertension  BP stable  On 
       Hospitalist Progress Note   Admit Date:  2024 11:11 PM   Name:  Ama Chu   Age:  74 y.o.  Sex:  female  :  1950   MRN:  [id number]   Room:  [facility]/    Presenting/Chief Complaint: No chief complaint on file.     Reason(s) for Admission: Abscess of right thigh [L02.415]     Hospital Course:     74 years old female with history of morbid obesity, history of gout, SLE, chronic diastolic congestive heart failure on diuretics rheumatoid arthritis presented to emergency room with chief complaint of right leg pain with redness and swelling involving the medial part of right lower thigh which gradually got worse.  Patient's daughter is a nurse practitioner who brought her to the emergency room for further evaluation.  No history of nausea, vomiting, chest pain, shortness of breath.  Evaluated in emergency room, CAT scan of right lower extremity shows evidence of abscess just medial to right knee joint.  CRP is significantly elevated.  White count is within normal limits.  Patient was given antibiotics, orthopedics was consulted and recommended admission to [facility].  Patient was started on IV antibiotics and is status post I&D on 2024, with wound VAC in place.  ID consulted and patient was switched to doxycycline for treatment plan of 7 to 10 days.  PT/OT recommended discharge to short-term rehab.  Per patient's daughter she wants patient to go to inpatient rehab instead.  Patient was denied by inpatient/acute rehab. Pending short-term rehab facilities at this time.    Subjective & 24hr Events:   Seen and examined at bedside this morning.  No acute events overnight.  Denies any complaints.  Pending short-term rehab facility availability.  Pending follow-up per CM  Assessment & Plan:       Abscess of right thigh  Ortho consulted: Status post I&D  Cultures pending  CT femur: shows comminuted fluid collection with surrounding inflammatory changes  Continue with doxycycline  Blood cultures negative to 
       Hospitalist Progress Note   Admit Date:  2024 11:11 PM   Name:  Ama Chu   Age:  74 y.o.  Sex:  female  :  1950   MRN:  [id number]   Room:  [facility]/    Presenting/Chief Complaint: No chief complaint on file.     Reason(s) for Admission: Abscess of right thigh [L02.415]     Hospital Course:     74 years old female with history of morbid obesity, history of gout, SLE, chronic diastolic congestive heart failure on diuretics rheumatoid arthritis presented to emergency room with chief complaint of right leg pain with redness and swelling involving the medial part of right lower thigh which gradually got worse.  Patient's daughter is a nurse practitioner who brought her to the emergency room for further evaluation.  No history of nausea, vomiting, chest pain, shortness of breath.  Evaluated in emergency room, CAT scan of right lower extremity shows evidence of abscess just medial to right knee joint.  CRP is significantly elevated.  White count is within normal limits.  Patient was given antibiotics, orthopedics was consulted and recommended admission to [facility].  Patient was started on IV antibiotics and is status post I&D on 2024, with wound VAC in place.  ID consulted and patient was switched to doxycycline for treatment plan of 7 to 10 days.  PT/OT recommended discharge to short-term rehab.  Per patient's daughter she wants patient to go to inpatient rehab instead.  Patient was denied by inpatient/acute rehab. Pending short-term rehab facilities at this time.    Subjective & 24hr Events:   Seen and examined at bedside this morning.  No acute events overnight.  Denies any complaints.  Pending short-term rehab facility availability.  Pending follow-up per CM    Assessment & Plan:       Abscess of right thigh  Ortho consulted: Status post I&D  CT femur: shows comminuted fluid collection with surrounding inflammatory changes  Continue with doxycycline, EOT 2024  Blood cultures negative to 
       Hospitalist Progress Note   Admit Date:  2024 11:11 PM   Name:  Ama Chu   Age:  74 y.o.  Sex:  female  :  1950   MRN:  [id number]   Room:  [facility]/    Presenting/Chief Complaint: No chief complaint on file.     Reason(s) for Admission: Abscess of right thigh [L02.415]     Hospital Course:   NOTICE FOR THE PATIENT: This clinical note is not designed to be interpreted by patients and we do not recommend reading it unless you have medical training. These notes may contain candid and (unintentionally) offensive descriptions, which are sometimes required for accurate documentation. If you would like more information about your healthcare, please obtain it directly by myself or my staff/colleagues - never solely from the notes. Thank you for your understanding and cooperation.     Please refer to the admission H&P for details of presentation.      In summary, Ama Chu is a 74 y.o. female with medical history significant for gout, SLE, chronic diastolic congestive heart failure on diuretics rheumatoid arthritis presented to emergency room with chief complaint of right leg pain with redness and swelling involving the medial part of right lower thigh which gradually got worse. Patient's daughter is a nurse practitioner who brought her to the emergency room for further evaluation. CT of RLE with abscess just medial to right knee joint. CRP is significantly elevated.     Patient was started on IV antibiotics and is status post I&D on 2024, with wound VAC in place. ID consulted and patient was switched to doxycycline for treatment plan of 7 to 10 days. PT/OT recommended discharge to short-term rehab. Per patient's daughter she wants patient to go to inpatient rehab instead. Patient was denied by inpatient/acute rehab.       Subjective/24 hr Events (24) :  Patient is seen and examined at bedside.    Sitting up in her bed.  Complained of left knee pain overnight but currently no pain.  
  [facility]        June 12, 2024         Post Op day: 1 Day Post-Op Procedure(s) (LRB):  LEG DEBRIDEMENT INCISION AND DRAINAGE (Right)      Admit Date: 6/9/2024  Admit Diagnosis: Abscess of right thigh [L02.415]       Principle Problem: Abscess of right thigh.           Subjective: Doing well, No complaints, No SOB, No Chest Pain, No Nausea or Vomiting     Objective:   Vital Signs are Stable, No Acute Distress, confused. Wound vac functioning.     Assessment / Plan :  Patient Active Problem List   Diagnosis    Closed disp comminuted fracture of shaft of left femur with malunion    Rheumatoid arthritis ([facility])    Hypertension    Depression    Neuropathy    CKD (chronic kidney disease) stage 3, GFR 30-59 ml/min ([facility])    Iron deficiency anemia    Weakness of right upper extremity    Acute osteomyelitis of lumbar spine ([facility])    Acute respiratory failure with hypercapnia ([facility])    Anxiety    Chronic diastolic heart failure ([facility])    Cutaneous lupus erythematosus    Discitis of lumbar region    Morbid obesity ([facility])    MEET (obstructive sleep apnea)    Plantar fasciitis, left    Sepsis ([facility])    Hypomagnesemia    Hypokalemia    Pneumonia    Abscess of left thigh    Abscess    Hypoxemia    Septicemia ([facility])    Immunocompromised state ([facility])    Abnormal cardiovascular stress test    Acute pain of left knee    Chronic pain of both knees    Adrenal insufficiency ([facility])    Age-related osteoporosis with current pathological fracture    Age-related osteoporosis without current pathological fracture    TODD (acute kidney injury) ([facility])    Anemia of chronic disease    Anemia    Angina pectoris, unspecified ([facility])    At risk for malnutrition    BMI 50.0-59.9, adult ([facility])    Body mass index (BMI) 45.0-49.9, adult ([facility])    Central stenosis of spinal canal    Chronic pain disorder    Chronic pain of both upper extremities    Chronic pain of both shoulders    Debility    Effusion of left knee    Elevated C-reactive protein (CRP)    
02 2 liters.  Alert, oriented x 4 with occasional confusion.  Antibiotics infused.  Wound vac functioning; no output from wound this shift.  Sleeps well from HS meds.  Bed alarm on, call light in reach.  
4 Eyes Skin Assessment     NAME:  Ama Chu  YOB: 1950  MEDICAL RECORD NUMBER:  [id number]    The patient is being assessed for  Admission    I agree that at least one RN has performed a thorough Head to Toe Skin Assessment on the patient. ALL assessment sites listed below have been assessed.      Areas assessed by both nurses:    Head, Face, Ears, Shoulders, Back, Chest, Arms, Elbows, Hands, Sacrum. Buttock, Coccyx, Ischium, and Legs. Feet and Heels        Does the Patient have a Wound? Yes wound(s) were present on assessment. LDA wound assessment was Initiated and completed by RN       Ozzy Prevention initiated by RN: Yes  Wound Care Orders initiated by RN: No    Pressure Injury (Stage 3,4, Unstageable, DTI, NWPT, and Complex wounds) if present, place Wound referral order by RN under : No    New Ostomies, if present place, Ostomy referral order under : No     Nurse 1 eSignature: Electronically signed by Alan Crews RN on 6/10/24 at 12:09 AM EDT    **SHARE this note so that the co-signing nurse can place an eSignature**    Nurse 2 eSignature: Electronically signed by Neeta Yu RN on 6/10/24 at 12:22 AM EDT   
ACUTE PHYSICAL THERAPY GOALS:   (Developed with and agreed upon by patient and/or caregiver.)     (1.)Ms. Chu will move from supine to sit and sit to supine  in bed with MODERATE ASSIST within 7 treatment day(s).    (2.)Ms. Chu will transfer from bed to chair via sliding board transfer and chair to bed with MAXIMAL ASSIST using the least restrictive device within 7 treatment day(s).    (4.)Ms. Chu will perform seated static and dynamic balance activities x 15 minutes with STAND BY ASSIST to improve safety within 7 treatment day(s).    PHYSICAL THERAPY: Daily Note PM   (Link to Caseload Tracking: PT Visit Days : 2  Time In/Out PT Charge Capture  Rehab Caseload Tracker  Orders    Ama Chu is a 74 y.o. female   PRIMARY DIAGNOSIS: Abscess of right thigh  Abscess of right thigh [L02.415]  Procedure(s) (LRB):  LEG DEBRIDEMENT INCISION AND DRAINAGE (Right)  7 Days Post-Op  Inpatient: Payor: MEDICARE / Plan: MEDICARE PART A AND B / Product Type: *No Product type* /     ASSESSMENT:     REHAB RECOMMENDATIONS:   Recommendation to date pending progress:  Setting:  Short-term Rehab    Equipment:    To Be Determined     ASSESSMENT:  Ms. Chu was supine in bed on arrival and agreeable to therapy. Supine to sit on edge of bed mod A x2. She tolerated sitting edge of bed with SBA working on dynamic and static sitting balance activities. Scooting to head of bed with max A x2-3. Pt returned to supine and left with needs in reach. Little progress with bed mobility today.     SUBJECTIVE:   Ms. Chu states, \"Thank you for coming to work with me\"     Social/Functional Lives With: Daughter  Type of Home: House  Home Equipment: Hospital bed, Wheelchair - Manual  Receives Help From: Family  ADL Assistance: Needs assistance  Ambulation Assistance: Non-ambulatory  Transfer Assistance: Needs assistance  Additional Comments: Lives w/ dtr, DOES NOT have caregiver, assistance from dtr but she works during the day. WC bound, slide 
ACUTE PHYSICAL THERAPY GOALS:   (Developed with and agreed upon by patient and/or caregiver.)    (1.)Ms. Chu will move from supine to sit and sit to supine  in bed with MODERATE ASSIST within 7 treatment day(s).    (2.)Ms. Chu will transfer from bed to chair via sliding board transfer and chair to bed with MAXIMAL ASSIST using the least restrictive device within 7 treatment day(s).    (4.)Ms. Chu will perform seated static and dynamic balance activities x 15 minutes with STAND BY ASSIST to improve safety within 7 treatment day(s).    ________________________________________________________________________________________________      PHYSICAL THERAPY Initial Assessment, Daily Note, and AM  (Link to Caseload Tracking: PT Visit Days : 1  Acknowledge Orders  Time In/Out  PT Charge Capture  Rehab Caseload Tracker    Ama Chu is a 74 y.o. female   PRIMARY DIAGNOSIS: Abscess of right thigh  Abscess of right thigh [L02.415]  Procedure(s) (LRB):  LEG DEBRIDEMENT INCISION AND DRAINAGE (Right)  2 Days Post-Op  Reason for Referral: Pain in Right Knee (M25.561)  Generalized Muscle Weakness (M62.81)  Inpatient: Payor: MEDICARE / Plan: MEDICARE PART A AND B / Product Type: *No Product type* /     ASSESSMENT:     REHAB RECOMMENDATIONS:   Recommendation to date pending progress:  Setting:  Short-term Rehab    Equipment:    To Be Determined     ASSESSMENT:  Ms. Chu is a 74 year old F who presents to hospital with c/o R leg pain, swelling and redness. An abscess was found in R medial knee and I&D performed on 6/11/24 and pt presents this AM supine in bed with wound vac in place and daughter and RN present at bedside. Pt is alert and oriented and states she transfers in/out of bed herself and uses a sliding board to get over to her manual w/c with her daughter present to stabilize w/c. Pt also states she uses a bedpan instead of the BSC, as she cannot get cleaned properly on the BSC. Daughter works most days and is not 
Attempted to call report X2 to [facility], no answer each time.   
[facility] DAILY NOTE  [facility]   Pharmacy Pharmacokinetic Monitoring Service - Vancomycin    Consulting Provider: Dr. Long   Indication: SSTI, abscess of leg near knee joint on CT  Target Concentration: Goal AUC/HIRAL 400-600 mg*hr/L  Day of Therapy: 1  Additional Antimicrobials: cefepime    Pertinent Laboratory Values:   Wt Readings from Last 1 Encounters:   06/10/24 (!) 160 kg (352 lb 11.8 oz)     Temp Readings from Last 1 Encounters:   06/10/24 98.2 °F (36.8 °C) (Oral)     Recent Labs     06/09/24  1529 06/09/24  1915 06/10/24  0112 06/10/24  0113   BUN 7*  --  7*  --    CREATININE 0.55*  --  0.55*  --    WBC 5.7  --   --  6.0   PROCAL 0.06  --   --   --    LACTSEPSIS  --  0.8  --   --      Estimated Creatinine Clearance: 147 mL/min (A) (based on SCr of 0.55 mg/dL (L)).      MRSA Nasal Swab: N/A. Non-respiratory infection    Assessment:  Date/Time Dose Concentration AUC         Note: Serum concentrations collected for AUC dosing may appear elevated if collected in close proximity to the dose administered, this is not necessarily an indication of toxicity    Plan:  Dosing recommendations based on Bayesian software  Continue vancomycin 1500 mg IV q12h  Anticipated AUC of 482 and trough concentration of 10.8 at steady state  Renal labs as indicated   Vancomycin concentrations will be ordered as clinically appropriate  Pharmacy will continue to monitor patient and adjust therapy as indicated    Thank you for the consult,  Paul Oconnor RP      
Comprehensive Nutrition Assessment    Type and Reason for Visit: Initial, RD Nutrition Re-Screen/LOS  Length of Stay    Nutrition Recommendations/Plan:   Meals and Snacks:  Diet: Continue current order  Nutrition Supplement Therapy:  Medical food supplement therapy:  Initiate Ensure High Protein once per day (this provides 160 kcal and 16 grams protein per bottle) and Ghassan twice per day (this provides 90 kcal and 2.5 grams protein per packet)     Malnutrition Assessment:  Malnutrition Status: No malnutrition     Nutrition Assessment:  Nutrition History: Pt reports intake was at baseline pta. She denies any recent changes.     Do You Have Any Cultural, Latter day, or Ethnic Food Preferences?: No   Weight History: Pt denies any changes in wt. Per EMR wt hx review 7/24 376lb, (rheumatology), 12/18 322lb (rheumatology), 5/8 360lb (FM).   Nutrition Background:       PMH significant for morbid obesity, gout, and CHF. Pt admitted with abscess of the right thigh. S/p I&D 6/11. Pt has a wound vac.   Nutrition Interval:  Pt seen sitting in bed with daughter present. She reports she is consuming more than half of her meals. RD observed Lunch tray ~75% consumed. Per nursing flowsheet documentation po intake mostly %. Pt is agreeable to trial Ghassan and Ensure HP. Discussed with Vonda HARVEY.      Current Nutrition Therapies:  ADULT DIET; Regular    Current Intake:   Average Meal Intake: % Average Supplements Intake: None Ordered      Anthropometric Measures:  Height: 172.7 cm (5' 8\")  Current Body Wt: 164 kg (361 lb 8.9 oz) (6/12), Weight source: Bed Scale  BMI: 55, Obese Class 3 (BMI 40.0 or greater)  Admission Body Weight: 170.1 kg (375 lb) (6/9-stated)  Ideal Body Weight (Kg) (Calculated): 64 kg (140 lbs),    BMI Category Obese Class 3 (BMI 40.0 or greater)  Estimated Daily Nutrient Needs:  Energy (kcal/day): 5708-0701 (25-30 kcal/kg) (Kcal/kg Weight used: 64 kg Ideal  Protein (g/day):  (1.5-2 g/kg) Weight Used: 
EOS     Pt resting in bed at this time. Pt denies any needs or pain. Call light and personal belongings left within reach. Pt encouraged to call with any needs. Pt is NPO at midnight for a procedure in the morning. Pre-op reported that she would go down at 0650. Bed in low/locked position, daughter at the bedside. Hourly rounding completed during this shift. Bedside shift report to be given to oncoming nurse.     
EOS     Pt resting in bed at this time. Pt denies any needs. Pain has been controlled with meds, see MAR. Pt went for her procedure today, has a wound vac in place. Per wound care, they will be changing it tomorrow. Call light and personal belongings left within reach. Pt encouraged to call with any needs. Hourly rounding completed during this shift. Bed in low/locked position, bed alarm in place. Bedside shift report to be given to oncoming nurse.     
Hourly rounds in progress. Rested well.  Am bath done for surgery.  Denies needs or pain at this time.  Bed in low locked position. Call light within reach.  Bed alarm on.  Will continue to monitor and give report to oncoming day shift nurse.    
Hourly rounds in progress. Slept well after HS meds.  Denies needs or pain at this time.  No output from wound vac this shift.  Bed in low locked position. Call light within reach.  Bed alarm on.  Will continue to monitor and give report to oncoming day shift nurse.    
Hourly rounds in progress. Wound vac functioning with no notable output.  Denies needs or pain at this time.  Bed in low locked position. Call light within reach.  Bed alarm on.  Will continue to monitor and give report to oncoming day shift nurse.    
Medicated po for pain x 1.  Wound vac functioning.  About 30 ml in canister.  Antibiotics infused.  Bed alarm on and call light in reach.  
Patient in bed alert and oriented, lying flat upon entering the room. Patient requesting bed pan. 22 Gauge right AC patent, flushed and capped. Patient reports one small bowel movement, voiding.   
Patient in bed, alert and oriented,daughter at bedside. Wound vac patent, set @ 125mmhg. Voiding via pure wick. Reports last bowel movement overnight. 22 Gauge right AC patent, flushed and capped. No further needs at this time.Will continue hourly rounds.   1359  Hourly rounds, blood pressure obtain prior to giving torsemide, verbalizes no needs at this time.  1810  Patient in bed, voiding, via pure-wick, no bowel movement today, Wound vac patent. Denies needs at this time.   
Physical Therapy Note:    Attempted to see patient this AM for physical therapy treatment  session. Patient was having wound vac change on attempt this AM. Will follow and re-attempt as schedule permits/patient available. Thank you,    Malissa Barker PTA     Rehab Caseload Tracker    
Pt is now in-patient to infectious disease and physical rehab. Pt's antibiotics were changed from IV to PO. Pt was given norco once this morning for right leg pain as well as miralax per request. Pt responded well to pain medication. Pt ambulated to chair with physical therapy. Pt was very weak when attempting to get back in bed. Pt required maximum assistance to slide on sliding board into bed. Pt is agreeable to short term rehab. Expected discharge tomorrow. All known needs met at this time. Bed is currently low, call light was left in reach, and pt was encouraged to ask for assistance. Pt was left resting in bed with no complaints. Will give bedside report to oncoming nurse.    
Pt's wound vac was changed this shift, see wound care note. Pt was given norco and dilaudid once in relation to dressing change. Pt was given norco again at end of shift. PT and OT were added to pt care team. Pt has rested most of afternoon. All known needs met at this time. Bed is currently low, call light was left in reach, and pt was encouraged to ask for assistance. Pt was left resting in bed with no complaints. Will give bedside report to oncoming nurse.    
TRANSFER - IN REPORT:    Verbal report received from Ila RN on Ama Chu  being received from  ED for routine progression of patient care      Report consisted of patient's Situation, Background, Assessment and   Recommendations(SBAR).     Information from the following report(s) ED Encounter Summary and ED SBAR was reviewed with the receiving nurse.    Opportunity for questions and clarification was provided.      Assessment completed upon patient's arrival to unit and care assumed.    
TRANSFER - OUT REPORT:    Verbal report given to CANDICE Guidry on Ama Chu  being transferred to preop for ordered procedure       Report consisted of patient's Situation, Background, Assessment and   Recommendations(SBAR).     Information from the following report(s) Nurse Handoff Report was reviewed with the receiving nurse.           Lines:   Peripheral IV 06/09/24 Left Antecubital (Active)   Site Assessment Clean, dry & intact 06/11/24 0354   Line Status Intermittent infusions;Infusing 06/11/24 0354   Line Care Connections checked and tightened 06/11/24 0354   Phlebitis Assessment No symptoms 06/11/24 0354   Infiltration Assessment 0 06/11/24 0354   Alcohol Cap Used Yes 06/11/24 0354   Dressing Status Clean, dry & intact 06/11/24 0354   Dressing Type Transparent 06/11/24 0354        Opportunity for questions and clarification was provided.      Patient transported with:  O2 @ 2lpm     Info on Meds to be given plus vanc to be started will pass to day shift nurse  
US Guided PIV access    Called for assistance with IV access. Ultrasound was used to find the vein which was compressible and does not have any features of an artery or nerve bundle. Skin was cleaned and disinfected prior to IV puncture. Under real-time ultrasound guidance, peripheral access was obtained in the right forearm using 22 G 1.75\" Peripheral IV catheter x 1 attempt. Blood return was present and IV flushed without difficulty. IV dressing applied, no immediate complications noted, and patient tolerated the procedure well.          
VANCO DAILY NOTE  [facility]   Pharmacy Pharmacokinetic Monitoring Service - Vancomycin    Consulting Provider: Dr. Long   Indication: SSTI, abscess of leg near knee joint on CT  Target Concentration: Goal AUC/HIRAL 400-600 mg*hr/L  Day of Therapy: 3  Additional Antimicrobials: cefepime    Pertinent Laboratory Values:   Wt Readings from Last 1 Encounters:   06/12/24 (!) 164 kg (361 lb 8.9 oz)     Temp Readings from Last 1 Encounters:   06/12/24 97.5 °F (36.4 °C) (Oral)     Recent Labs     06/09/24  1529 06/09/24  1915 06/10/24  0112 06/10/24  0113 06/11/24  0519 06/12/24  0345   BUN 7*  --  7*  --  11 12   CREATININE 0.55*  --  0.55*  --  0.86 0.67   WBC 5.7  --   --  6.0 5.8 5.0   PROCAL 0.06  --   --   --   --   --    LACTSEPSIS  --  0.8  --   --   --   --      Estimated Creatinine Clearance: 123 mL/min (based on SCr of 0.67 mg/dL).    Lab Results   Component Value Date/Time    VANCORANDOM 25.4 06/12/2024 03:45 AM       MRSA Nasal Swab: N/A. Non-respiratory infection    Assessment:  Date/Time Dose Concentration AUC   6/12 0345 1250 mg Q12H 25.4 529   Note: Serum concentrations collected for AUC dosing may appear elevated if collected in close proximity to the dose administered, this is not necessarily an indication of toxicity    Plan:  Dosing recommendations based on Bayesian software  Continue vancomycin 1250 mg IV q12h as regimen therapeutic.   Renal labs as indicated   Vancomycin concentrations will be ordered as clinically appropriate  Pharmacy will continue to monitor patient and adjust therapy as indicated    Thank you for the consult,  Paul Oconnor [facility]          
VANCO DAILY NOTE  [facility]   Pharmacy Pharmacokinetic Monitoring Service - Vancomycin    Consulting Provider: Dr. Long   Indication: SSTI  Target Concentration: Goal AUC/HIRAL 400-600 mg*hr/L  Day of Therapy: 0  Additional Antimicrobials: cefepime    Pertinent Laboratory Values:   Wt Readings from Last 1 Encounters:   06/09/24 (!) 170.1 kg (375 lb)     Temp Readings from Last 1 Encounters:   06/09/24 97.3 °F (36.3 °C)     Recent Labs     06/09/24  1529 06/09/24  1915   BUN 7*  --    CREATININE 0.55*  --    WBC 5.7  --    PROCAL 0.06  --    LACTSEPSIS  --  0.8     Estimated Creatinine Clearance: 153 mL/min (A) (based on SCr of 0.55 mg/dL (L)).    Lab Results   Component Value Date/Time    VANCOTROUGH 36.0 03/26/2024 04:16 AM    VANCORANDOM 14.1 01/30/2024 04:18 AM       MRSA Nasal Swab: N/A. Non-respiratory infection    Assessment:  Date/Time Dose Concentration AUC         Note: Serum concentrations collected for AUC dosing may appear elevated if collected in close proximity to the dose administered, this is not necessarily an indication of toxicity    Plan:  Dosing recommendations based on Bayesian software  Start vancomycin 2500 mg IV x 1 dose followed by Vanc 1500 mg IV q12h  Anticipated AUC of 528 and trough concentration of 13.5 at steady state  Renal labs as indicated   Vancomycin concentrations will be ordered as clinically appropriate  Pharmacy will continue to monitor patient and adjust therapy as indicated    Thank you for the consult,  KYARA ZHAO [facility]    
VANCO DAILY NOTE  [facility]   Pharmacy Pharmacokinetic Monitoring Service - Vancomycin    Consulting Provider: Dr. Long   Indication: SSTI, abscess of leg near knee joint on CT  Target Concentration: Goal AUC/HIRAL 400-600 mg*hr/L  Day of Therapy: 2  Additional Antimicrobials: cefepime    Pertinent Laboratory Values:   Wt Readings from Last 1 Encounters:   06/11/24 (!) 157.9 kg (348 lb)     Temp Readings from Last 1 Encounters:   06/11/24 97.7 °F (36.5 °C) (Oral)     Recent Labs     06/09/24  1529 06/09/24  1915 06/10/24  0112 06/10/24  0113 06/11/24  0519   BUN 7*  --  7*  --  11   CREATININE 0.55*  --  0.55*  --  0.86   WBC 5.7  --   --  6.0 5.8   PROCAL 0.06  --   --   --   --    LACTSEPSIS  --  0.8  --   --   --      Estimated Creatinine Clearance: 93 mL/min (based on SCr of 0.86 mg/dL).      MRSA Nasal Swab: N/A. Non-respiratory infection    Assessment:  Date/Time Dose Concentration AUC         Note: Serum concentrations collected for AUC dosing may appear elevated if collected in close proximity to the dose administered, this is not necessarily an indication of toxicity    Plan:  Dosing recommendations based on Bayesian software  Continue vancomycin 1250 mg IV q12h  Anticipated AUC of 569 and trough concentration of 15.1 at steady state  Renal labs as indicated   Vancomycin concentrations will be ordered as clinically appropriate  Pharmacy will continue to monitor patient and adjust therapy as indicated    Thank you for the consult,  Paul Oconnor [facility]        
C-reactive protein (CRP)    Encounter for long-term current use of high risk medication    Long term (current) use of non-steroidal anti-inflammatories (nsaid)    Epidural abscess    ESR raised    Fall    Fluid overload    Generalized osteoarthrosis, involving multiple sites    Great toe pain, left    Heart failure, unspecified (HCC)    Hypertensive heart disease with heart failure (HCC)    Inability to walk    Iron deficiency    Localized edema    Long term systemic steroid user    Long-term current use of bisphosphonate    Low back pain    Low serum cortisol level    Major depressive disorder, single episode, unspecified    Multiple wounds    Open wound of tissue overlying spine    Opioid dependence (HCC)    Osteomyelitis of vertebra of lumbar region (HCC)    Pain in left wrist    Plantar fascial fibromatosis    Primary osteoarthritis of both knees    Retention of urine    Right hip pain    Right leg swelling    Sedative, hypnotic or anxiolytic-related disorder (HCC)    Slow transit constipation    Stiffness of left shoulder joint    Ulcer of right foot, limited to breakdown of skin (HCC)    Vitamin B12 deficiency    Vitamin D insufficiency    Stage 4 chronic kidney disease (HCC)    Depressive disorder    Recurrent depression (HCC)    Seasonal allergic rhinitis    Essential hypertension    Obstructive sleep apnea syndrome    Pneumonia of left lung due to infectious organism    Arthritis    Primary generalized (osteo)arthritis    Rheumatoid arthritis involving multiple sites with positive rheumatoid factor (HCC)    Other specified rheumatoid arthritis, multiple sites (HCC)    Sjogren's syndrome (HCC)    Systemic lupus erythematosus (HCC)    Unspecified osteoarthritis, unspecified site    Bacteremia    Weakness of left upper extremity    Abscess of right thigh    Patient Vitals for the past 8 hrs:   BP Temp Temp src Pulse Resp SpO2   06/13/24 0808 -- -- -- -- 17 --   06/13/24 0720 (!) 115/57 97.3 °F (36.3 °C) Oral 
positions completed ~15 mins unsupported sitting with fair+ sitting balance. Laterally scooted towards head of bed with Mod A x 2. Returned supine in bed with Mod A x 2 and rolled to R/L side in bed x2 with Max A x 2. Left sitting upright in bed with needs met. Progressing slowly towards goals. Will continue OT efforts as indicated.       SUBJECTIVE:     Ms. Chu states, \"you can come back\"     Social/Functional Lives With: Daughter  Type of Home: House  Home Equipment: Hospital bed, Wheelchair - Manual  Receives Help From: Family  ADL Assistance: Needs assistance  Ambulation Assistance: Non-ambulatory  Transfer Assistance: Needs assistance  Additional Comments: Lives w/ dtr, DOES NOT have caregiver, assistance from dtr but she works during the day. WC bound, slide board xfers, assistance for all minus grooming/ feeding    OBJECTIVE:     LINES / DRAINS / AIRWAY: IV and Wound Vac    RESTRICTIONS/PRECAUTIONS:           PAIN: VITALS / O2:   Pre Treatment:    none        Post Treatment: none Vitals    WFL      Oxygen   RA     MOBILITY: I Mod I S SBA CGA Min Mod Max Total  NT x2 Comments:   Bed Mobility    Rolling [] [] [] [] [] [] [] [x] [] [] [x]    Supine to Sit [] [] [] [] [] [] [x] [] [] [] [x]    Scooting [] [] [] [] [] [] [x] [] [] [] [x] Lateral scooting towards HOB sitting EOB   Sit to Supine [] [] [] [] [] [] [] [x] [] [] [x]    Transfers    Sit to Stand [] [] [] [] [] [] [] [] [] [] []    Bed to Chair [] [] [] [] [] [] [] [] [] [] []    Stand to Sit [] [] [] [] [] [] [] [] [] [] []    Tub/Shower [] [] [] [] [] [] [] [] [] [] []     Toilet [] [] [] [] [] [] [] [] [] [] []      [] [] [] [] [] [] [] [] [] [] []    I=Independent, Mod I=Modified Independent, S=Supervision/Setup, SBA=Standby Assistance, CGA=Contact Guard Assistance, Min=Minimal Assistance, Mod=Moderate Assistance, Max=Maximal Assistance, Total=Total Assistance, NT=Not Tested    ACTIVITIES OF DAILY LIVING: I Mod I S SBA CGA Min Mod Max Total NT 
transfer. Pt lives with her daughter but her daughter works during the day.     Pt up in chair upon arrival , requesting assistance with getting back to bed. Pt required max Ax4 for transfer from chair to bed via slide board. Pt is limited by weakness as well as body habitus. Pt assisted supine with max Ax2. Spoke with pt and daughter about need for STR prior to returning home, they are in agreement.     [facility] AM-PAC™ “6 Clicks” Daily Activity Inpatient Short Form:    AM-PAC Daily Activity - Inpatient   How much help is needed for putting on and taking off regular lower body clothing?: Total  How much help is needed for bathing (which includes washing, rinsing, drying)?: A Lot  How much help is needed for toileting (which includes using toilet, bedpan, or urinal)?: A Lot  How much help is needed for putting on and taking off regular upper body clothing?: A Lot  How much help is needed for taking care of personal grooming?: None  How much help for eating meals?: None  AM-[facility] Inpatient Daily Activity Raw Score: 15  AM-PAC Inpatient ADL T-Scale Score : 34.69  ADL Inpatient CMS 0-100% Score: 56.46  ADL Inpatient CMS G-Code Modifier : CK           SUBJECTIVE:     Ms. Chu states, \"I need help with all that, and that's the way it is.\"     Social/Functional Lives With: Daughter  Type of Home: House  Home Equipment: Hospital bed, Wheelchair - Manual  Receives Help From: Family  ADL Assistance: Needs assistance  Ambulation Assistance: Non-ambulatory  Transfer Assistance: Needs assistance  Additional Comments: Lives w/ dtr, DOES NOT have caregiver, assistance from dtr but she works during the day. WC bound, slide board xfers, assistance for all minus grooming/ feeding    OBJECTIVE:     LINES / DRAINS / AIRWAY: External Catheter and Wound Vac    RESTRICTIONS/PRECAUTIONS:       PAIN: VITALS / O2:   Pre Treatment:    Did not c/o pain      Post Treatment: same       Vitals          Oxygen            GROSS EVALUATION: 
Max=Maximal Assistance, Total=Total Assistance, NT=Not Tested    ACTIVITIES OF DAILY LIVING: I Mod I S SBA CGA Min Mod Max Total NT Comments   BASIC ADLs:              Upper Body Bathing  [] [] [] [] [] [] [] [] [] []     Lower Body Bathing [] [] [] [] [] [] [] [] [] []     Toileting [] [] [] [] [] [] [] [] [] []    Upper Body Dressing [] [] [] [] [] [] [] [] [] []    Lower Body Dressing [] [] [] [] [] [] [] [] [] []    Feeding [] [] [] [] [] [] [] [] [] []    Grooming [] [] [x] [] [] [] [] [] [] []    Personal Device Care [] [] [] [] [] [] [] [] [] []    Functional Mobility [] [] [] [] [] [] [] [] [] []    I=Independent, Mod I=Modified Independent, S=Supervision/Setup, SBA=Standby Assistance, CGA=Contact Guard Assistance, Min=Minimal Assistance, Mod=Moderate Assistance, Max=Maximal Assistance, Total=Total Assistance, NT=Not Tested    TREATMENT:     EVALUATION: LOW COMPLEXITY: (Untimed Charge)  The initial evaluation charge encompasses clinical chart review, objective assessment, interpretation of assessment, and skilled monitoring of the patient's response to treatment in order to develop a plan of care.     TREATMENT:   Therapeutic Activity (10 Minutes): Patient participated in therapeutic activities including bed mobility training with minimal verbal cues in order to increase independence, increase activity tolerance, decrease assistance required, and prepare for discharge home.     TREATMENT GRID:  N/A    AFTER TREATMENT PRECAUTIONS: Bed, Call light within reach, Needs within reach, and RN notified    INTERDISCIPLINARY COLLABORATION:  RN/ PCT    EDUCATION:  Education Given To: Patient  Education Provided: Role of Therapy;Plan of Care    TOTAL TREATMENT DURATION AND TIME:  Time In: 1008  Time Out: 1028  Minutes: 20    Soha Woodall, OT               
MG/DL    Est, Glom Filt Rate >90 >60 ml/min/1.73m2    Calcium 9.0 8.8 - 10.2 MG/DL   CBC    Collection Time: 06/14/24  6:55 AM   Result Value Ref Range    WBC 8.5 4.3 - 11.1 K/uL    RBC 3.12 (L) 4.05 - 5.2 M/uL    Hemoglobin 8.8 (L) 11.7 - 15.4 g/dL    Hematocrit 30.2 (L) 35.8 - 46.3 %    MCV 96.8 82 - 102 FL    MCH 28.2 26.1 - 32.9 PG    MCHC 29.1 (L) 31.4 - 35.0 g/dL    RDW 16.4 (H) 11.9 - 14.6 %    Platelets 315 150 - 450 K/uL    MPV 9.8 9.4 - 12.3 FL    nRBC 0.00 0.0 - 0.2 K/uL   Magnesium    Collection Time: 06/14/24  6:55 AM   Result Value Ref Range    Magnesium 1.5 (L) 1.8 - 2.4 mg/dL       No results for input(s): \"COVID19\" in the last 72 hours.    Current Meds:  Current Facility-Administered Medications   Medication Dose Route Frequency    doxycycline hyclate (VIBRAMYCIN) capsule 100 mg  100 mg Oral 2 times per day    sodium chloride flush 0.9 % injection 5-40 mL  5-40 mL IntraVENous 2 times per day    sodium chloride flush 0.9 % injection 5-40 mL  5-40 mL IntraVENous PRN    0.9 % sodium chloride infusion   IntraVENous PRN    HYDROmorphone HCl PF (DILAUDID) injection 0.5 mg  0.5 mg IntraVENous Q4H PRN    aspirin EC tablet 325 mg  325 mg Oral Daily    ipratropium 0.5 mg-albuterol 2.5 mg (DUONEB) nebulizer solution 1 Dose  1 Dose Inhalation Q4H PRN    predniSONE (DELTASONE) tablet 10 mg  10 mg Oral Daily    sodium chloride flush 0.9 % injection 5-40 mL  5-40 mL IntraVENous 2 times per day    sodium chloride flush 0.9 % injection 5-40 mL  5-40 mL IntraVENous PRN    0.9 % sodium chloride infusion   IntraVENous PRN    potassium chloride (KLOR-CON M) extended release tablet 40 mEq  40 mEq Oral PRN    Or    potassium bicarb-citric acid (EFFER-K) effervescent tablet 40 mEq  40 mEq Oral PRN    Or    potassium chloride 10 mEq/100 mL IVPB (Peripheral Line)  10 mEq IntraVENous PRN    magnesium sulfate 2000 mg in 50 mL IVPB premix  2,000 mg IntraVENous PRN    ondansetron (ZOFRAN-ODT) disintegrating tablet 4 mg  4 mg 
10 mL sodium chloride syringe   IntraVENous PRN    lactated ringers IV soln infusion   IntraVENous Continuous    sodium chloride flush 0.9 % injection 5-40 mL  5-40 mL IntraVENous 2 times per day    sodium chloride flush 0.9 % injection 5-40 mL  5-40 mL IntraVENous PRN    0.9 % sodium chloride infusion   IntraVENous PRN    HYDROmorphone HCl PF (DILAUDID) injection 0.25 mg  0.25 mg IntraVENous Q5 Min PRN    oxyCODONE (ROXICODONE) immediate release tablet 5 mg  5 mg Oral Once PRN    ondansetron (ZOFRAN) injection 4 mg  4 mg IntraVENous Once PRN    haloperidol lactate (HALDOL) injection 1 mg  1 mg IntraVENous Once PRN    glucose chewable tablet 16 g  4 tablet Oral PRN    dextrose bolus 10% 125 mL  125 mL IntraVENous PRN    Or    dextrose bolus 10% 250 mL  250 mL IntraVENous PRN    Glucagon Emergency KIT 1 mg  1 mg SubCUTAneous PRN    dextrose 10 % infusion   IntraVENous Continuous PRN    ipratropium 0.5 mg-albuterol 2.5 mg (DUONEB) nebulizer solution 1 Dose  1 Dose Inhalation Q4H PRN    predniSONE (DELTASONE) tablet 10 mg  10 mg Oral Daily    sodium chloride flush 0.9 % injection 5-40 mL  5-40 mL IntraVENous 2 times per day    sodium chloride flush 0.9 % injection 5-40 mL  5-40 mL IntraVENous PRN    0.9 % sodium chloride infusion   IntraVENous PRN    potassium chloride (KLOR-CON M) extended release tablet 40 mEq  40 mEq Oral PRN    Or    potassium bicarb-citric acid (EFFER-K) effervescent tablet 40 mEq  40 mEq Oral PRN    Or    potassium chloride 10 mEq/100 mL IVPB (Peripheral Line)  10 mEq IntraVENous PRN    magnesium sulfate 2000 mg in 50 mL IVPB premix  2,000 mg IntraVENous PRN    ondansetron (ZOFRAN-ODT) disintegrating tablet 4 mg  4 mg Oral Q8H PRN    Or    ondansetron (ZOFRAN) injection 4 mg  4 mg IntraVENous Q6H PRN    polyethylene glycol (GLYCOLAX) packet 17 g  17 g Oral Daily PRN    acetaminophen (TYLENOL) tablet 650 mg  650 mg Oral Q6H PRN    Or    acetaminophen (TYLENOL) suppository 650 mg  650 mg Rectal Q6H

## 2024-06-19 NOTE — WOUND CARE
Wound vac dressing to R inner thigh changed, adequate seal obtained/functioning properly. Pt/daughter states expected DC today to STR, home vac not visualized in room yet.    [id number], tunnel 6cm@7&11 o'clock, full thickness, pink/red, granular, skin edematous/flaky, defined/flat and slow epithelialization at edges, scant serosanguinous drainage, no odor.

## 2024-06-19 NOTE — PLAN OF CARE
Problem: Discharge Planning  Goal: Discharge to home or other facility with appropriate resources  Outcome: Progressing  Flowsheets (Taken 6/18/2024 1923)  Discharge to home or other facility with appropriate resources:   Identify barriers to discharge with patient and caregiver   Arrange for needed discharge resources and transportation as appropriate   Identify discharge learning needs (meds, wound care, etc)     Problem: Skin/Tissue Integrity  Goal: Absence of new skin breakdown  Description: 1.  Monitor for areas of redness and/or skin breakdown  2.  Assess vascular access sites hourly  3.  Every 4-6 hours minimum:  Change oxygen saturation probe site  4.  Every 4-6 hours:  If on nasal continuous positive airway pressure, respiratory therapy assess nares and determine need for appliance change or resting period.  Outcome: Progressing     Problem: Safety - Adult  Goal: Free from fall injury  Outcome: Progressing     Problem: Pain  Goal: Verbalizes/displays adequate comfort level or baseline comfort level  Outcome: Progressing

## 2024-06-19 NOTE — DISCHARGE SUMMARY
Hospitalist Discharge Summary   Admit Date:  2024 11:11 PM   DC Note date: 2024  Name:  Ama Chu   Age:  74 y.o.  Sex:  female  :  1950   MRN:  [id number]   Room:  [facility]  PCP:  Jose Miguel Jimenez Jr., MD    Presenting Complaint: No chief complaint on file.     Initial Admission Diagnosis: Abscess of right thigh [L02.415]     Problem List for this Hospitalization (present on admission):    Principal Problem:    Abscess of right thigh  Active Problems:    Anxiety    Morbid obesity (HCC)    Adrenal insufficiency (HCC)    Long term systemic steroid user    Essential hypertension    Systemic lupus erythematosus (HCC)  Resolved Problems:    * No resolved hospital problems. *      Hospital Course:  Please refer to the admission H&P for details of presentation. In summary, Ama Chu is a 74 y.o. female with past medical history significant for gout, SLE, chronic diastolic congestive heart failure on diuretics rheumatoid arthritis presented to emergency room with chief complaint of right leg pain with redness and swelling involving the medial part of right lower thigh which gradually got worse. Patient's daughter is a nurse practitioner who brought her to the emergency room for further evaluation. CT of RLE with abscess just medial to right knee joint. CRP is significantly elevated.      Patient was started on IV antibiotics and is status post I&D on 2024, with wound VAC in place. ID consulted and patient was switched to doxycycline for treatment plan of 7 to 10 days. PT/OT recommended discharge to short-term rehab. Per patient's daughter she wants patient to go to inpatient rehab instead. Patient was denied by inpatient/acute rehab.  Pt is being discharged to [facility] and will need to complete her course of abx.  Wound care with wound vac and follow up with orthopedics.     Patient is medically stable for discharge. Patient is to continue taking medications as prescribed and to follow up

## 2024-06-25 ENCOUNTER — TELEPHONE (OUTPATIENT)
Dept: ORTHOPEDIC SURGERY | Age: 74
End: 2024-06-25

## 2024-06-25 NOTE — TELEPHONE ENCOUNTER
Please  call pts  luisa Negron   Her  mother  cannot  come to  this  weeks POST OP     She is  in rehab and  they  cannot  transport  her.  Dtr said  mother  will be  there for  about  3 more  weeks.    Pt is  in a  wheelchair  and  dtr  cannot  bring  her  alone    Please  call to  r/s

## 2024-06-25 NOTE — TELEPHONE ENCOUNTER
Returned call to Pt's daughter she stated pt is at [facility]. PT's daughter advised that the facility is supposed to transport pt to Post op appts. Daughter stated she will talk to facility and let us know. We will leave appt as sched for now.  LH

## 2024-06-25 NOTE — TELEPHONE ENCOUNTER
Returned call to Pt's daughter she stated facility was not able to transport pt for PO visit. Per Daughter's request pt's appt was moved to 7/3 @1:30pm pt's daughter voiced complete understanding. LH

## 2024-07-03 ENCOUNTER — OFFICE VISIT (OUTPATIENT)
Dept: ORTHOPEDIC SURGERY | Age: 74
End: 2024-07-03
Payer: MEDICARE

## 2024-07-03 DIAGNOSIS — S71.102D COMPLICATED OPEN WOUND OF LEFT THIGH, SUBSEQUENT ENCOUNTER: Primary | ICD-10-CM

## 2024-07-03 PROCEDURE — G8417 CALC BMI ABV UP PARAM F/U: HCPCS | Performed by: ORTHOPAEDIC SURGERY

## 2024-07-03 PROCEDURE — G8428 CUR MEDS NOT DOCUMENT: HCPCS | Performed by: ORTHOPAEDIC SURGERY

## 2024-07-03 PROCEDURE — 1090F PRES/ABSN URINE INCON ASSESS: CPT | Performed by: ORTHOPAEDIC SURGERY

## 2024-07-03 PROCEDURE — 1036F TOBACCO NON-USER: CPT | Performed by: ORTHOPAEDIC SURGERY

## 2024-07-03 PROCEDURE — 99213 OFFICE O/P EST LOW 20 MIN: CPT | Performed by: ORTHOPAEDIC SURGERY

## 2024-07-03 PROCEDURE — 1123F ACP DISCUSS/DSCN MKR DOCD: CPT | Performed by: ORTHOPAEDIC SURGERY

## 2024-07-03 PROCEDURE — 97605 NEG PRS WND THER DME<=50SQCM: CPT | Performed by: ORTHOPAEDIC SURGERY

## 2024-07-03 PROCEDURE — 3017F COLORECTAL CA SCREEN DOC REV: CPT | Performed by: ORTHOPAEDIC SURGERY

## 2024-07-03 PROCEDURE — G8400 PT W/DXA NO RESULTS DOC: HCPCS | Performed by: ORTHOPAEDIC SURGERY

## 2024-07-03 PROCEDURE — 1111F DSCHRG MED/CURRENT MED MERGE: CPT | Performed by: ORTHOPAEDIC SURGERY

## 2024-07-08 NOTE — PROGRESS NOTES
Orthopaedic Trauma Clinic Note    Name: Ama Chu  YOB: 1950  Gender: female  MRN: [id number]  PCP:    HPI:   Ama Chu is a 74 y.o. female presenting from skilled nursing facility for wound check.    Complex medical history: gout, SLE, chronic diastolic congestive heart failure on diuretics rheumatoid arthritis     Patient was hospitalized from 6/9/2024 through 6/19/2024.  Review of discharge summary, it appears her main problem was an abscess of the right thigh.    She underwent incision and drainage by Dr. Gutierrez on 6/11/2024.    Wound has been covered with wound VAC.  Patient is accompanied by her daughter today.    Reports ongoing wound care at nursing facility.  Overall doing well  Hopes to return home in the near future    ROS/Meds/PSH/PMH/FH/SH:  Pertinent details discussed in HPI    Physical Examination:  General:  Awake and conversive, no distress, well nourished, age-appropriate.  Neurological: A&O x 3, normal coordination and tone.  Psych: Normal mood, affect and behavior. Normal thought content and judgment. Cooperative w/ exam.    Musculoskeletal Exam:  Right lower extremity  - Skin: Approximately 3 cm x 2 cm wound on the medial aspect of the distal thigh just proximal to the knee.  Wound has clean margins with beefy granulation tissue  -I performed wound VAC change  - Digits warm, well-perfused, brisk cap refill    Gait: Exam deferred    Imaging:   No new imaging today    Procedures: I personally performed wound VAC change    Application of Wound Vac     Procedure:  - Application of wound vac to area measuring < 50 cm square (CPT 93424)    Procedure performed by: Rei Clement MD    Time Out Protocol:  Written informed consent obtained from patient   Patient identity confirmed; by name and date of birth  Patient confirms procedure being performed and matches consent  Correct side confirmed and site marked   Relevant chart information reviewed  Availability of required items and

## 2024-07-09 LAB
FUNGAL CULT/SMEAR: NORMAL
FUNGUS (MYCOLOGY) CULTURE: NORMAL
FUNGUS SMEAR: NORMAL
REFLEX TO ID: NORMAL
SPECIMEN PROCESSING: NORMAL
SPECIMEN SOURCE: NORMAL
SPECIMEN SOURCE: NORMAL

## 2024-07-23 ENCOUNTER — OFFICE VISIT (OUTPATIENT)
Dept: ORTHOPEDIC SURGERY | Age: 74
End: 2024-07-23

## 2024-07-23 DIAGNOSIS — S71.102D COMPLICATED OPEN WOUND OF LEFT THIGH, SUBSEQUENT ENCOUNTER: Primary | ICD-10-CM

## 2024-07-23 PROCEDURE — 99024 POSTOP FOLLOW-UP VISIT: CPT | Performed by: ORTHOPAEDIC SURGERY

## 2024-07-23 NOTE — PROGRESS NOTES
Progress Note    Patient: Ama Chu MRN: [id number]  SSN: [id number]    YOB: 1950  Age: 74 y.o.  Sex: female        7/23/2024      Subjective:     Patient is here today in follow-up.  She is about 6 weeks out from her most recent I&D.  This is on the right medial thigh.  Her original surgery for her fixation of her femur was on the left and she had a debridement of this and is done well since then.  Her family is with her today and they say that she has gotten to the point now with really not much for the wound VAC to be on and she stopped the wound VAC to is having irregular dressing on this but she has been placed on some oral antibiotics in case she has little bit of cellulitis in this area    Objective:     There were no vitals filed for this visit.       Physical Exam:     Skin -it does appear that today her wound is granulating very well.  Is definitely much smaller.  It is smaller than Dr. Clement's measurements is only about 1 cm x 1-1/2 cm today and granulating very well.  There is no redness around the wound today but she has been on antibiotics for couple days  Motor and sensory function intact in RIGHT LOWER extremity  Pulses palpable in RIGHT LOWER extremity     XRAY FINDINGS:  No new x-rays    Assessment:     Improving right medial knee wound    Plan:     I am very pleased way this looks today.  Will give her over the Acticoat to put on this I think she can do daily dressing changes either just dry gauze dressing changes or some Acticoat and I will see her back in about 2 weeks to make sure she is doing well.  If she develops worsening of her redness or symptoms or has problems when she comes off the oral antibiotics I Be happy to see her sooner as long as she is doing well we will see her back in 2 weeks for a wound check.  She will be about 8 weeks out from her most recent surgery at that time.  She can be activity as tolerated full active and passive range of motion full

## 2024-07-26 ENCOUNTER — TELEPHONE (OUTPATIENT)
Dept: ORTHOPEDIC SURGERY | Age: 74
End: 2024-07-26

## 2024-07-26 NOTE — TELEPHONE ENCOUNTER
Wanda from Post acute care would like to know if the patient could come in next week. She said the patient is having some Post Op issues like swelling and she said the surgical site is not looking good.

## 2024-07-26 NOTE — TELEPHONE ENCOUNTER
Returned call. LVM that we would move patient appointment to Tuesday 7/30/21 to call back if that did not work.

## 2024-07-30 ENCOUNTER — OFFICE VISIT (OUTPATIENT)
Dept: ORTHOPEDIC SURGERY | Age: 74
End: 2024-07-30

## 2024-07-30 DIAGNOSIS — S80.11XA LEG HEMATOMA, RIGHT, INITIAL ENCOUNTER: ICD-10-CM

## 2024-07-30 DIAGNOSIS — T14.8XXA HEMATOMA: Primary | ICD-10-CM

## 2024-07-30 NOTE — PROGRESS NOTES
July 30, 2024         Progress Note    Patient: Ama Chu MRN: [id number]  SSN: [id number]    YOB: 1950  Age: 74 y.o.  Sex: female        7/30/2024      Subjective:     Patient is here today in follow-up.  She is now about 7 weeks out from her most recent I&D.  I saw her last week she seemed to be doing well.  She has this sort of hard area in the medial aspect of her thigh and an ultrasound was ordered and this is saying is worrisome for abscess    Objective:     There were no vitals filed for this visit.       Physical Exam:     Skin -her wound is almost completely healed.  There is no redness around her wound there is no drainage the area in question is a very scarred area on her medial thigh  Motor and sensory function intact in RIGHT LOWER extremity  Pulses palpable in RIGHT LOWER extremity     XRAY FINDINGS:  No new x-rays    Assessment:     Completely benign appearance of right medial thigh wound    Plan:     So I talked to the patient and her family regarding the fact that this is sort of up to them.  Clinically I think this appears to be very benign and I think that obviously any sort of imaging study has to be interpreted in context with the clinical picture.  She does not have any obvious concern for abscess clinically and obviously any sort of seroma or fluid collection or even just scarring would probably give this appearance on the ultrasound so that is why it is not really very helpful.  But what I have offered is just to sort of help everyone feel little bit better about this if she would like for me to I will aspirate this and she is agrees so after sterile prep I have given her some lidocaine in this area and then attempt an aspiration of her medial thigh area    PROCEDURE NOTE    PREOP DIAGNOSIS-right medial thigh seroma    POST OP DIAGNOSIS-right medial thigh seroma    PROCEDURE-aspiration of right medial thigh seroma    MEDS-10 cc of 1% Lidocaine    Findings-aspiration

## 2024-08-01 LAB
BACTERIA SPEC CULT: NORMAL
GRAM STN SPEC: NORMAL
GRAM STN SPEC: NORMAL
SERVICE CMNT-IMP: NORMAL

## 2024-08-02 LAB
BACTERIA SPEC CULT: NORMAL
SERVICE CMNT-IMP: NORMAL

## 2024-08-05 LAB
BACTERIA SPEC CULT: NORMAL
SERVICE CMNT-IMP: NORMAL

## 2024-08-06 ENCOUNTER — OFFICE VISIT (OUTPATIENT)
Dept: ORTHOPEDIC SURGERY | Age: 74
End: 2024-08-06

## 2024-08-06 DIAGNOSIS — S80.11XA LEG HEMATOMA, RIGHT, INITIAL ENCOUNTER: ICD-10-CM

## 2024-08-06 DIAGNOSIS — T14.8XXA HEMATOMA: Primary | ICD-10-CM

## 2024-08-06 NOTE — PROGRESS NOTES
Progress Note    Patient: Ama Chu MRN: [id number]  SSN: [id number]    YOB: 1950  Age: 74 y.o.  Sex: female        8/6/2024      Subjective:     Patient is here today in follow-up.  I did aspirate this area that was sort of a bit questionable that is in the medial aspect of her right thigh.  The fluid did not appear to be obviously purulent and I did send it for culture her culture come back negative.  She really sees that she is doing pretty well otherwise.  She has asked about the fact that she has a old medial malleolus screw that sort of bothered her because it is prominent and she has some pain over that but otherwise she is doing well    Objective:     There were no vitals filed for this visit.       Physical Exam:     Skin -her wound over the medial aspect of her right thigh has almost completely healed now almost completely epithelialized.  She does not have any redness over the area that had aspirated last week  Motor and sensory function intact in RIGHT LOWER extremity  Pulses palpable in RIGHT LOWER extremity     XRAY FINDINGS:  No new x-rays today    Assessment:     Fluid collection right medial thigh negative for infection, healing right medial thigh wound    Plan:     So we have just talked her about a couple things.  I think that since the cultures were negative as long as she does not develop any redness or an angry wound I think we can just watch this.  If she does have a flareup after she stopped her antibiotics which she has been off a few days I be happy to see her back again I like to see it before antibiotics are started at this all possible.  I just told her daughter that.  This problem with her right ankle I have asked her if she would like us to sort of work this up and get some x-rays as it does not appear that she has had any recent x-rays of this but she thinks she likely just would have waited out and not really do anything about it so I think that is reasonable.  I

## 2024-09-16 ENCOUNTER — OFFICE VISIT (OUTPATIENT)
Age: 74
End: 2024-09-16
Payer: MEDICARE

## 2024-09-16 DIAGNOSIS — M25.562 CHRONIC PAIN OF BOTH KNEES: Primary | ICD-10-CM

## 2024-09-16 DIAGNOSIS — G89.29 CHRONIC PAIN OF BOTH KNEES: Primary | ICD-10-CM

## 2024-09-16 DIAGNOSIS — M25.561 CHRONIC PAIN OF BOTH KNEES: Primary | ICD-10-CM

## 2024-09-16 PROCEDURE — 99213 OFFICE O/P EST LOW 20 MIN: CPT | Performed by: ANESTHESIOLOGY

## 2024-09-16 PROCEDURE — 1123F ACP DISCUSS/DSCN MKR DOCD: CPT | Performed by: ANESTHESIOLOGY

## 2024-09-16 RX ORDER — TRAMADOL HYDROCHLORIDE 50 MG/1
50 TABLET ORAL EVERY 6 HOURS PRN
COMMUNITY
Start: 2024-08-07

## 2024-09-16 RX ORDER — MIRTAZAPINE 30 MG/1
30 TABLET, FILM COATED ORAL NIGHTLY
COMMUNITY
Start: 2024-09-10

## 2024-09-16 RX ORDER — ZIPRASIDONE HYDROCHLORIDE 80 MG/1
CAPSULE ORAL
COMMUNITY

## 2024-09-16 RX ORDER — ASPIRIN 81 MG/1
81 TABLET ORAL DAILY
COMMUNITY

## 2024-11-19 NOTE — PROGRESS NOTES
Procedure Date: 2024      Location: [facility]       Procedure: bilateral genicular nerve blocks        Time Out performed prior to start of the procedure:       Josep Bocanegra M.D.  performed the following reviews on Ama Chu 1950 prior to the start of the procedure:       patient was identified by name and     agreement on procedure being performed was verified   risks and benefits explained to patient by the provider  procedure site verified as Bilateral  patient was positioned for comfort   consent signed and verified for procedure       Time:  2:41 PM        Procedure performed by:   Josep Bocanegra M.D.       Patient assisted by:   TATI MACKEY MA

## 2024-11-21 ENCOUNTER — OFFICE VISIT (OUTPATIENT)
Dept: ORTHOPEDIC SURGERY | Age: 74
End: 2024-11-21

## 2024-11-21 DIAGNOSIS — M25.561 CHRONIC PAIN OF BOTH KNEES: Primary | ICD-10-CM

## 2024-11-21 DIAGNOSIS — M25.562 CHRONIC PAIN OF BOTH KNEES: Primary | ICD-10-CM

## 2024-11-21 DIAGNOSIS — G89.29 CHRONIC PAIN OF BOTH KNEES: Primary | ICD-10-CM

## 2024-11-21 NOTE — PROGRESS NOTES
[facility]    NAME: Ama Chu  ID:[id number]   :1950    Location: [facility]    Procedure: bilateral Genicular Nerve Blocks Under Fluoroscopic Imaging  Lateral Retinacular Nerve Branch of the Sciatic Nerve.  Medial Retinacular Nerve Branch of the Femoral Nerve  3.   Infrapatellar Nerve Branch of the Saphenous Nerve     Pre-op Diagnosis: Knee Osteoarthritis    Post-op Diagnosis: Same     Anesthesia: Local only     Complications: None    After confirming written and informed consent and discussing the risk, benefits and alternatives for the procedure, the patient had the correct site marked by the physician performing the procedure. The specific risks of bleeding and infection were discussed. The patient was taken to the fluoroscopy suite.    The patient was placed in the supine position. A pulse oximeter was placed, and verbal and visual monitoring were maintained throughout the procedure. The physician cleaned his hands with an alcohol containing solution. The physician wore a hat and mask, sterile gloves were applied. The skin overlying the left then right knee was cleaned with chlorhexadine gluconate. Sterile towels were applied as a drape. A timeout was performed involving the patient, physician and radiation technologist.  The expected path of the lateral retinacular nerve (superiolateral geniculate nerve), a branch of the sciatic nerve. was then identified using fluoroscopy. The skin overlying the superiolateral femoral condyle was anesthetized with 1% lidocaine via a 25 G 1.5 inch needle. Next, using a coaxial technique with intermittent fluoroscopic guidance a 25 G 3.5 inch spinal needle was advanced toward the lateral midpoint of the femoral shaft at the junction of the superiolateral femoral condyle and shaft.    Next, the expected path of the medial retinacular nerve, a branch of the femoral nerve (superiomedial geniculate nerve) was then identified using fluoroscopy. The skin overlying the

## 2024-11-21 NOTE — PROGRESS NOTES
Procedure cancelled. Unable to perform. Patient unable to transfer to the tableThis encounter was created in error - please disregard.

## (undated) DEVICE — STOCKINETTE,IMPERVIOUS,12X48,STERILE: Brand: MEDLINE

## (undated) DEVICE — FOAM BUMP ROUND LARGE: Brand: MEDLINE INDUSTRIES, INC.

## (undated) DEVICE — LOWER EXTREMITY: Brand: MEDLINE INDUSTRIES, INC.

## (undated) DEVICE — BANDAGE,GAUZE,BULKEE II,4.5"X4.1YD,STRL: Brand: MEDLINE

## (undated) DEVICE — GLOVE ORANGE PI 7 1/2   MSG9075

## (undated) DEVICE — SOLUTION IRRIG 3000ML 0.9% SOD CHL USP UROMATIC PLAS CONT

## (undated) DEVICE — BANDAGE COBAN 4 IN COMPR W4INXL5YD FOAM COHESIVE QUIK STK SELF ADH SFT

## (undated) DEVICE — DRAPE,U/SHT,SPLIT,FILM,60X84,STERILE: Brand: MEDLINE

## (undated) DEVICE — GLOVE SURG SZ 8 L12IN FNGR THK79MIL GRN LTX FREE

## (undated) DEVICE — INTENDED FOR TISSUE SEPARATION, AND OTHER PROCEDURES THAT REQUIRE A SHARP SURGICAL BLADE TO PUNCTURE OR CUT.: Brand: BARD-PARKER ® STAINLESS STEEL BLADES

## (undated) DEVICE — 7 DAY SILVER-COATED ANTIMICROBIAL BARRIER DRESSING: Brand: ACTICOAT 7  4" X 5"

## (undated) DEVICE — CANNULA NSL ORAL AD FOR CAPNOFLEX CO2 O2 AIRLFE

## (undated) DEVICE — CANISTER NEG PRSS 500ML WND THER W/ TBNG NO PRSS RANG W/

## (undated) DEVICE — PREMIUM WET SKIN PREP TRAY: Brand: MEDLINE INDUSTRIES, INC.

## (undated) DEVICE — SOLUTION IRRIG 1000ML 0.9% SOD CHL USP POUR PLAS BTL

## (undated) DEVICE — AIRLIFE™ OXYGEN TUBING 7 FEET (2.1 M) CRUSH RESISTANT OXYGEN TUBING, VINYL TIPPED: Brand: AIRLIFE™

## (undated) DEVICE — SHEET,DRAPE,53X77,STERILE: Brand: MEDLINE

## (undated) DEVICE — ENDOSCOPIC KIT 1.1+ OP4 CA DE 2 GWN AAMI LEVEL 3

## (undated) DEVICE — SHEET, DRAPE, SPLIT, STERILE: Brand: MEDLINE

## (undated) DEVICE — DRAPE XR CASS L UNIV FIT ADH CLSR

## (undated) DEVICE — SINGLE PORT MANIFOLD: Brand: NEPTUNE 2

## (undated) DEVICE — HANDPIECE SET WITH COAXIAL HIGH FLOW TIP AND SUCTION TUBE: Brand: INTERPULSE

## (undated) DEVICE — TRAXI PANNICULUS RETRACTOR WITH RETENTUS TECHNOLOGY (BMI 30-50): Brand: TRAXI® PANNICULUS RETRACTOR

## (undated) DEVICE — CYSTO/BLADDER IRRIGATION SET, REGULATING CLAMP

## (undated) DEVICE — KIT DRSG SM W12.5XH3.2XL18CM VAC SH DRP PD W/ CONN DISP RUL

## (undated) DEVICE — 3M™ IOBAN™ 2 ANTIMICROBIAL INCISE DRAPE 6648EZ: Brand: IOBAN™ 2

## (undated) DEVICE — SURG GL, SENSICARE PI ORTHO LT,LF,PF,8.5: Brand: MEDLINE

## (undated) DEVICE — BIT DRILL CALIBRATED 4.2 EX-LONG

## (undated) DEVICE — KIT ARMOR C DRP COLLAPSIBLE AND SELF EXP TOP CVR FOR FLUOROSCOPIC

## (undated) DEVICE — DRAPE,UNDERBUTTOCKS,PCH,STERILE: Brand: MEDLINE

## (undated) DEVICE — CONNECTOR TBNG OD5-7MM O2 END DISP

## (undated) DEVICE — GARMENT,MEDLINE,DVT,INT,CALF,LG, GEN2: Brand: MEDLINE

## (undated) DEVICE — SURG GL,SENSICARE PI ORTHO LT,LF,PF,8.0: Brand: MEDLINE

## (undated) DEVICE — GAUZE,SPONGE,4"X4",12PLY,WOVEN,NS,LF: Brand: MEDLINE

## (undated) DEVICE — ROD RMR L950MM DIA2.5MM W/ EXTN BALL TIP

## (undated) DEVICE — PAD,ABDOMINAL,5"X9",ST,LF,25/BX: Brand: MEDLINE INDUSTRIES, INC.

## (undated) DEVICE — MOUTHPIECE ENDOSCP L CTRL OPN AND SIDE PORTS DISP

## (undated) DEVICE — REAMER SHAFT, MOD.TRINKLE: Brand: BIXCUT

## (undated) DEVICE — SUTURE MCRYL SZ 2-0 L27IN ABSRB UD SH L26MM TAPERPOINT NDL Y417H

## (undated) DEVICE — BLOCK BITE AD 60FR W/ VELC STRP ADDRESSES MOST PT AND

## (undated) DEVICE — DRESSING NEG PRSS M 18X12.5X3.3CM POLYUR FOR WND THER VAC

## (undated) DEVICE — KENDALL RADIOLUCENT FOAM MONITORING ELECTRODE RECTANGULAR SHAPE: Brand: KENDALL